# Patient Record
Sex: MALE | Race: WHITE | NOT HISPANIC OR LATINO | Employment: OTHER | URBAN - METROPOLITAN AREA
[De-identification: names, ages, dates, MRNs, and addresses within clinical notes are randomized per-mention and may not be internally consistent; named-entity substitution may affect disease eponyms.]

---

## 2017-01-23 ENCOUNTER — OFFICE VISIT (OUTPATIENT)
Dept: INTERNAL MEDICINE | Facility: CLINIC | Age: 77
End: 2017-01-23
Attending: INTERNAL MEDICINE
Payer: MEDICARE

## 2017-01-23 VITALS
BODY MASS INDEX: 25.76 KG/M2 | HEART RATE: 70 BPM | WEIGHT: 184 LBS | DIASTOLIC BLOOD PRESSURE: 62 MMHG | OXYGEN SATURATION: 97 % | HEIGHT: 71 IN | SYSTOLIC BLOOD PRESSURE: 110 MMHG

## 2017-01-23 DIAGNOSIS — N40.1 BENIGN NON-NODULAR PROSTATIC HYPERPLASIA WITH LOWER URINARY TRACT SYMPTOMS: ICD-10-CM

## 2017-01-23 DIAGNOSIS — E78.00 HYPERCHOLESTEROLEMIA: ICD-10-CM

## 2017-01-23 DIAGNOSIS — M10.9 ACUTE GOUT INVOLVING TOE OF RIGHT FOOT, UNSPECIFIED CAUSE: ICD-10-CM

## 2017-01-23 DIAGNOSIS — R32 URINARY INCONTINENCE, UNSPECIFIED TYPE: ICD-10-CM

## 2017-01-23 DIAGNOSIS — R41.3 MEMORY LOSS: ICD-10-CM

## 2017-01-23 DIAGNOSIS — E11.9 TYPE 2 DIABETES MELLITUS WITHOUT COMPLICATION, WITHOUT LONG-TERM CURRENT USE OF INSULIN: ICD-10-CM

## 2017-01-23 DIAGNOSIS — Z95.2 HISTORY OF MITRAL VALVE REPLACEMENT: ICD-10-CM

## 2017-01-23 DIAGNOSIS — I48.21 PERMANENT ATRIAL FIBRILLATION: Primary | ICD-10-CM

## 2017-01-23 PROCEDURE — G0442 ANNUAL ALCOHOL SCREEN 15 MIN: HCPCS | Mod: 59,S$GLB,, | Performed by: INTERNAL MEDICINE

## 2017-01-23 PROCEDURE — 99214 OFFICE O/P EST MOD 30 MIN: CPT | Mod: 25,S$GLB,, | Performed by: INTERNAL MEDICINE

## 2017-01-23 PROCEDURE — 1160F RVW MEDS BY RX/DR IN RCRD: CPT | Mod: S$GLB,,, | Performed by: INTERNAL MEDICINE

## 2017-01-23 PROCEDURE — G0444 DEPRESSION SCREEN ANNUAL: HCPCS | Mod: 59,S$GLB,, | Performed by: INTERNAL MEDICINE

## 2017-01-23 PROCEDURE — 3074F SYST BP LT 130 MM HG: CPT | Mod: S$GLB,,, | Performed by: INTERNAL MEDICINE

## 2017-01-23 PROCEDURE — 3078F DIAST BP <80 MM HG: CPT | Mod: S$GLB,,, | Performed by: INTERNAL MEDICINE

## 2017-01-23 PROCEDURE — 1157F ADVNC CARE PLAN IN RCRD: CPT | Mod: S$GLB,,, | Performed by: INTERNAL MEDICINE

## 2017-01-23 PROCEDURE — 1159F MED LIST DOCD IN RCRD: CPT | Mod: S$GLB,,, | Performed by: INTERNAL MEDICINE

## 2017-01-23 NOTE — MR AVS SNAPSHOT
[facility]  [address] [facility], [address] [address]  Phone: [phone]  Fax: [phone]                  Rikki Morrison   2017 11:15 AM   Office Visit    Description:  Male : 1940   Provider:  MADAI Villafuerte MD   Department:  [facility]           Reason for Visit     Urinary Incontinence     Memory Loss           Diagnoses this Visit        Comments    Permanent atrial fibrillation    -  Primary     History of mitral valve replacement         Type 2 diabetes mellitus without complication, without long-term current use of insulin         Memory loss         Hypercholesterolemia                To Do List           Future Appointments        Provider Department Dept Phone    3/8/2017 11:15 AM MD [facility] [phone]      Goals (5 Years of Data)     None      Follow-Up and Disposition     Return in about 4 months (around 2017).      [facility]     [facility] -  Assistance  Registered nurses in the [facility] provide clinical advisement, health education, appointment booking, and other advisory services.  Call for this free service at [phone].             Medications           Message regarding Medications     Verify the changes and/or additions to your medication regime listed below are the same as discussed with your clinician today.  If any of these changes or additions are incorrect, please notify your healthcare provider.        STOP taking these medications     PREVNAR 13, PF, 0.5 mL Syrg            Verify that the below list of medications is an accurate representation of the medications you are currently taking.  If none reported, the list may be blank. If incorrect, please contact your healthcare provider. Carry this list with you in case of emergency.           Current Medications     amlodipine (NORVASC) 2.5 MG tablet Take 1 tablet (2.5 mg total) by mouth once daily.    aspirin (ECOTRIN) 81 MG EC tablet Take 1  "tablet (81 mg total) by mouth once daily.    cholecalciferol, vitamin D3, (VITAMIN D3) 1,000 unit capsule Take 1,000 Units by mouth once daily.    cyanocobalamin, vitamin B-12, 1,000 mcg Subl Place 1 tablet under the tongue once daily.    donepezil (ARICEPT ODT) 10 mg disintegrating tablet Take 1 tablet (10 mg total) by mouth every evening.    ferrous sulfate 325 (65 FE) MG EC tablet Take 325 mg by mouth once daily.    finasteride (PROSCAR) 5 mg tablet Take 1 tablet (5 mg total) by mouth once daily.    fish oil-omega-3 fatty acids 300-1,000 mg capsule Take 1 g by mouth once daily.    indomethacin (INDOCIN) 50 MG capsule TK 1 C PO TID WITH MEALS    metoprolol succinate (TOPROL-XL) 50 MG 24 hr tablet Take 1 tablet (50 mg total) by mouth once daily.    nitroGLYCERIN (NITROSTAT) 0.4 MG SL tablet Place 1 tablet (0.4 mg total) under the tongue every 5 (five) minutes as needed for Chest pain.    omeprazole (PRILOSEC) 20 MG capsule Take 1 capsule (20 mg total) by mouth 2 (two) times daily.    PNV w/o calcium-iron fum-FA (M-VIT) 27-1 mg Tab Take by mouth.    potassium citrate (UROCIT-K) 10 mEq (1,080 mg) TbSR Take 1 tablet (10 mEq total) by mouth 3 (three) times daily with meals.    rosuvastatin (CRESTOR) 5 MG tablet Take 1 tablet (5 mg total) by mouth every evening.    tamsulosin (FLOMAX) 0.4 mg Cp24 Take 1 capsule (0.4 mg total) by mouth once daily.    trospium (SANCTURA XR) 60 mg Cp24 capsule     UBIDECARENONE (COENZYME Q10) 200 mg Tab Take by mouth once daily.    vortioxetine (TRINTELLIX) 5 mg Tab Take 1 tablet by mouth once daily.    warfarin (COUMADIN) 5 MG tablet Take 1 tablet (5 mg total) by mouth Daily. DOSE TO BE ADJUSTED ACCORDING TO INR.           Clinical Reference Information           Vital Signs - Last Recorded  Most recent update: 1/23/2017 11:25 AM by Andres Church MA    BP Pulse Ht Wt SpO2 BMI    110/62 70 5' 11" (1.803 m) 83.5 kg (184 lb) 97% 25.66 kg/m2      Blood Pressure          Most Recent Value "    BP  110/62      Allergies as of 1/23/2017     Pcn [Penicillins]      Immunizations Administered on Date of Encounter - 1/23/2017     None

## 2017-01-23 NOTE — PROGRESS NOTES
"Subjective:       Patient ID: Rikki Morrison is a 76 y.o. male.    Chief Complaint: Urinary Incontinence and Memory Loss    HPI Comments: Saw Dr. Theodore for urinary incont. Not is Prostate. On Sanctura without much improvement.  Dr. Theodore believes it is due to "his brain"  Still poor memory.  Agitation is better on the Trintellix.    Review of Systems   Constitutional: Negative.    Respiratory: Negative.    Cardiovascular: Negative.    Genitourinary: Positive for enuresis and urgency.   Psychiatric/Behavioral: Positive for confusion. Negative for dysphoric mood.       Objective:      Physical Exam   Constitutional: He appears well-developed and well-nourished.   HENT:   Head: Normocephalic and atraumatic.   Eyes: Pupils are equal, round, and reactive to light.   Cardiovascular: Normal rate.  An irregularly irregular rhythm present.   Murmur heard.   Crescendo decrescendo systolic murmur is present with a grade of 3/6   Pulses:       Dorsalis pedis pulses are 2+ on the right side, and 1+ on the left side.   Mechanical sounds   Pulmonary/Chest: Effort normal.   Musculoskeletal: He exhibits edema.        Lumbar back: Normal.        Right foot: There is decreased range of motion.        Left foot: There is swelling.        Feet:    1+ edema bilat   Feet:   Right Foot:   Protective Sensation: 3 sites tested. 3 sites sensed.   Skin Integrity: Positive for erythema.   Left Foot:   Protective Sensation: 3 sites tested. 3 sites sensed.   Skin Integrity: Positive for erythema.   Neurological: He is alert. He has normal strength.       Assessment:       1. Permanent atrial fibrillation    2. History of mitral valve replacement    3. Type 2 diabetes mellitus without complication, without long-term current use of insulin    4. Memory loss    5. Hypercholesterolemia    6. Benign non-nodular prostatic hyperplasia with lower urinary tract symptoms    7. Urinary incontinence, unspecified type        Plan:       Per orders and " D/C instructions.  Continue meds/diet for A. Fib, DM, and high cholest. which are stable.  See Dr. Mae for memory loss and urinary incont.    Screening: The patient was screened for depression with the PHQ2 questionnaire and possible health consequences were discussed with the patient, who understands (15 minutes spent). The patient was screened for the misuse of alcohol, by asking the number of drinks per average week, and if pt has had more than 4 drinks (more than 3 for women and elderly) in 1 day within the past year. The health and legal consequences of misuse were discussed (15 minutes spent). The patient was screened for obesity (BMI>30), If the current BMI > 30, then the possible consequences of obesity, as well as the benefits of diet, exercise, and weight loss were discussed (15 minutes spent).

## 2017-02-10 RX ORDER — OMEPRAZOLE 20 MG/1
CAPSULE, DELAYED RELEASE ORAL
Qty: 180 CAPSULE | Refills: 1 | Status: SHIPPED | OUTPATIENT
Start: 2017-02-10 | End: 2017-08-01 | Stop reason: SDUPTHER

## 2017-03-08 ENCOUNTER — OFFICE VISIT (OUTPATIENT)
Dept: INTERNAL MEDICINE | Facility: CLINIC | Age: 77
End: 2017-03-08
Attending: INTERNAL MEDICINE
Payer: MEDICARE

## 2017-03-08 VITALS
SYSTOLIC BLOOD PRESSURE: 132 MMHG | BODY MASS INDEX: 26.04 KG/M2 | WEIGHT: 186 LBS | HEART RATE: 75 BPM | HEIGHT: 71 IN | OXYGEN SATURATION: 97 % | DIASTOLIC BLOOD PRESSURE: 68 MMHG

## 2017-03-08 DIAGNOSIS — E78.00 HYPERCHOLESTEROLEMIA: Primary | ICD-10-CM

## 2017-03-08 DIAGNOSIS — E11.9 TYPE 2 DIABETES MELLITUS WITHOUT COMPLICATION, WITHOUT LONG-TERM CURRENT USE OF INSULIN: ICD-10-CM

## 2017-03-08 DIAGNOSIS — I48.21 PERMANENT ATRIAL FIBRILLATION: ICD-10-CM

## 2017-03-08 DIAGNOSIS — N40.1 BENIGN NON-NODULAR PROSTATIC HYPERPLASIA WITH LOWER URINARY TRACT SYMPTOMS: ICD-10-CM

## 2017-03-08 DIAGNOSIS — R41.3 MEMORY LOSS: ICD-10-CM

## 2017-03-08 DIAGNOSIS — R45.1 AGITATION: ICD-10-CM

## 2017-03-08 PROCEDURE — 3075F SYST BP GE 130 - 139MM HG: CPT | Mod: S$GLB,,, | Performed by: INTERNAL MEDICINE

## 2017-03-08 PROCEDURE — 3078F DIAST BP <80 MM HG: CPT | Mod: S$GLB,,, | Performed by: INTERNAL MEDICINE

## 2017-03-08 PROCEDURE — 1157F ADVNC CARE PLAN IN RCRD: CPT | Mod: S$GLB,,, | Performed by: INTERNAL MEDICINE

## 2017-03-08 PROCEDURE — 1160F RVW MEDS BY RX/DR IN RCRD: CPT | Mod: S$GLB,,, | Performed by: INTERNAL MEDICINE

## 2017-03-08 PROCEDURE — G0442 ANNUAL ALCOHOL SCREEN 15 MIN: HCPCS | Mod: 59,S$GLB,, | Performed by: INTERNAL MEDICINE

## 2017-03-08 PROCEDURE — 99214 OFFICE O/P EST MOD 30 MIN: CPT | Mod: 25,S$GLB,, | Performed by: INTERNAL MEDICINE

## 2017-03-08 PROCEDURE — 1159F MED LIST DOCD IN RCRD: CPT | Mod: S$GLB,,, | Performed by: INTERNAL MEDICINE

## 2017-03-08 PROCEDURE — G0444 DEPRESSION SCREEN ANNUAL: HCPCS | Mod: 59,S$GLB,, | Performed by: INTERNAL MEDICINE

## 2017-03-08 NOTE — MR AVS SNAPSHOT
[facility]  [address] [facility], [address] [address]  Phone: [phone]  Fax: [phone]                  Rikki Morrison   3/8/2017 11:15 AM   Office Visit    Description:  Male : 1940   Provider:  MADAI Villafuerte MD   Department:  [facility]           Reason for Visit     Follow-up           Diagnoses this Visit        Comments    Hypercholesterolemia    -  Primary     Memory loss         Benign non-nodular prostatic hyperplasia with lower urinary tract symptoms         Permanent atrial fibrillation         Type 2 diabetes mellitus without complication, without long-term current use of insulin                To Do List           Future Appointments        Provider Department Dept Phone    2017 1:00 PM MD [facility] [phone]      Goals (5 Years of Data)     None      Follow-Up and Disposition     Return in about 4 months (around 2017).      [facility]     [facility] On Call Nurse Care Line -  Assistance  Registered nurses in the [facility] On Call Center provide clinical advisement, health education, appointment booking, and other advisory services.  Call for this free service at [phone].             Medications           Message regarding Medications     Verify the changes and/or additions to your medication regime listed below are the same as discussed with your clinician today.  If any of these changes or additions are incorrect, please notify your healthcare provider.             Verify that the below list of medications is an accurate representation of the medications you are currently taking.  If none reported, the list may be blank. If incorrect, please contact your healthcare provider. Carry this list with you in case of emergency.           Current Medications     amlodipine (NORVASC) 2.5 MG tablet Take 1 tablet (2.5 mg total) by mouth once daily.    aspirin (ECOTRIN) 81 MG EC tablet Take 1 tablet (81 mg total) by mouth once daily.     "cholecalciferol, vitamin D3, (VITAMIN D3) 1,000 unit capsule Take 1,000 Units by mouth once daily.    cyanocobalamin, vitamin B-12, 1,000 mcg Subl Place 1 tablet under the tongue once daily.    donepezil (ARICEPT ODT) 10 mg disintegrating tablet Take 1 tablet (10 mg total) by mouth every evening.    ferrous sulfate 325 (65 FE) MG EC tablet Take 325 mg by mouth once daily.    finasteride (PROSCAR) 5 mg tablet Take 1 tablet (5 mg total) by mouth once daily.    fish oil-omega-3 fatty acids 300-1,000 mg capsule Take 1 g by mouth once daily.    indomethacin (INDOCIN) 50 MG capsule TK 1 C PO TID WITH MEALS    metoprolol succinate (TOPROL-XL) 50 MG 24 hr tablet Take 1 tablet (50 mg total) by mouth once daily.    nitroGLYCERIN (NITROSTAT) 0.4 MG SL tablet Place 1 tablet (0.4 mg total) under the tongue every 5 (five) minutes as needed for Chest pain.    omeprazole (PRILOSEC) 20 MG capsule TAKE ONE CAPSULE BY MOUTH TWICE DAILY    PNV w/o calcium-iron fum-FA (M-VIT) 27-1 mg Tab Take by mouth.    potassium citrate (UROCIT-K) 10 mEq (1,080 mg) TbSR Take 1 tablet (10 mEq total) by mouth 3 (three) times daily with meals.    rosuvastatin (CRESTOR) 5 MG tablet Take 1 tablet (5 mg total) by mouth every evening.    trospium (SANCTURA XR) 60 mg Cp24 capsule     UBIDECARENONE (COENZYME Q10) 200 mg Tab Take by mouth once daily.    vortioxetine (TRINTELLIX) 5 mg Tab Take 1 tablet by mouth once daily.    warfarin (COUMADIN) 5 MG tablet Take 1 tablet (5 mg total) by mouth Daily. DOSE TO BE ADJUSTED ACCORDING TO INR.           Clinical Reference Information           Your Vitals Were     BP Pulse Height Weight SpO2 BMI    132/68 75 5' 11" (1.803 m) 84.4 kg (186 lb) 97% 25.94 kg/m2      Blood Pressure          Most Recent Value    BP  132/68      Allergies as of 3/8/2017     Pcn [Penicillins]      Immunizations Administered on Date of Encounter - 3/8/2017     None      Language Assistance Services     ATTENTION: Language assistance services " are available, free of charge. Please call [phone].      ATENCIÓN: Si habla chaimañol, tiene a preston disposición servicios gratuitos de asistencia lingüística. Llame al [phone].     CHÚ Ý: N?u b?n nói Ti?ng Vi?t, có các d?ch v? h? tr? ngôn ng? mi?n phí dành cho b?n. G?i s? [phone].         [facility] complies with applicable Federal civil rights laws and does not discriminate on the basis of race, color, national origin, age, disability, or sex.

## 2017-03-08 NOTE — PROGRESS NOTES
Subjective:       Patient ID: Rikki Morrison is a 76 y.o. male.    Chief Complaint: Follow-up (4 month)    HPI Comments: Memory loss seems stable. Has appt with Dr. Mae in May.  Has some urinary incont. No better with Santura.    Diabetes   He presents for his follow-up diabetic visit. He has type 2 diabetes mellitus. His disease course has been stable.     Review of Systems   Constitutional: Negative.    Respiratory: Negative.    Cardiovascular: Negative.    Genitourinary: Positive for enuresis.   Psychiatric/Behavioral: Positive for agitation. Negative for dysphoric mood.       Objective:      Physical Exam   Constitutional: He appears well-developed and well-nourished.   HENT:   Head: Normocephalic and atraumatic.   Eyes: Pupils are equal, round, and reactive to light.   Cardiovascular: Normal rate and regular rhythm.    Murmur heard.   Crescendo decrescendo systolic murmur is present with a grade of 3/6   Pulses:       Dorsalis pedis pulses are 2+ on the right side, and 1+ on the left side.   Mechanical sounds   Pulmonary/Chest: Effort normal.   Musculoskeletal: He exhibits edema.        Lumbar back: Normal.        Right foot: There is decreased range of motion.        Left foot: There is swelling.        Feet:    1+ edema bilat   Feet:   Right Foot:   Protective Sensation: 3 sites tested. 3 sites sensed.   Skin Integrity: Positive for erythema.   Left Foot:   Protective Sensation: 3 sites tested. 3 sites sensed.   Skin Integrity: Positive for erythema.   Neurological: He is alert. He has normal strength.       Assessment:       1. Hypercholesterolemia    2. Memory loss    3. Benign non-nodular prostatic hyperplasia with lower urinary tract symptoms    4. Permanent atrial fibrillation    5. Type 2 diabetes mellitus without complication, without long-term current use of insulin    6. Agitation        Plan:       Per orders and D/C instructions.  Continue meds/diet for DM, BPH, A. fib, and high cholest.  which are stable.  See Dr. Benavides for memory loss.    Screening: The patient was screened for depression with the PHQ2 questionnaire and possible health consequences were discussed with the patient, who understands (15 minutes spent). The patient was screened for the misuse of alcohol, by asking the number of drinks per average week, and if pt has had more than 4 drinks (more than 3 for women and elderly) in 1 day within the past year. The health and legal consequences of misuse were discussed (15 minutes spent). The patient was screened for obesity (BMI>30), If the current BMI > 30, then the possible consequences of obesity, as well as the benefits of diet, exercise, and weight loss were discussed (15 minutes spent).

## 2017-03-22 PROBLEM — E66.3 OVERWEIGHT: Status: ACTIVE | Noted: 2017-03-22

## 2017-05-08 ENCOUNTER — HOSPITAL ENCOUNTER (OUTPATIENT)
Dept: RADIOLOGY | Facility: OTHER | Age: 77
Discharge: HOME | End: 2017-05-08
Attending: INTERNAL MEDICINE
Payer: MEDICARE

## 2017-05-08 DIAGNOSIS — S49.91XA RIGHT SHOULDER INJURY, INITIAL ENCOUNTER: ICD-10-CM

## 2017-05-08 DIAGNOSIS — S49.91XA RIGHT SHOULDER INJURY, INITIAL ENCOUNTER: Primary | ICD-10-CM

## 2017-05-08 PROCEDURE — 73030 X-RAY EXAM OF SHOULDER: CPT | Mod: TC,RT

## 2017-05-08 PROCEDURE — 73030 X-RAY EXAM OF SHOULDER: CPT | Mod: 26,RT,, | Performed by: RADIOLOGY

## 2017-06-05 DIAGNOSIS — R41.3 MEMORY LOSS: ICD-10-CM

## 2017-06-05 RX ORDER — DONEPEZIL HYDROCHLORIDE 10 MG/1
TABLET, ORALLY DISINTEGRATING ORAL
Qty: 90 TABLET | Refills: 3 | Status: SHIPPED | OUTPATIENT
Start: 2017-06-05 | End: 2017-12-05

## 2017-06-12 DIAGNOSIS — Z79.01 CHRONIC ANTICOAGULATION: ICD-10-CM

## 2017-06-12 DIAGNOSIS — I05.9 MITRAL VALVE DISEASE: ICD-10-CM

## 2017-06-12 DIAGNOSIS — I48.21 PERMANENT ATRIAL FIBRILLATION: ICD-10-CM

## 2017-06-14 DIAGNOSIS — Z79.01 CHRONIC ANTICOAGULATION: ICD-10-CM

## 2017-06-14 DIAGNOSIS — I05.9 MITRAL VALVE DISEASE: ICD-10-CM

## 2017-06-14 DIAGNOSIS — I48.21 PERMANENT ATRIAL FIBRILLATION: ICD-10-CM

## 2017-06-21 PROBLEM — E66.3 OVERWEIGHT: Status: RESOLVED | Noted: 2017-03-22 | Resolved: 2017-06-21

## 2017-07-31 DIAGNOSIS — Z79.01 CHRONIC ANTICOAGULATION: ICD-10-CM

## 2017-07-31 DIAGNOSIS — I48.21 PERMANENT ATRIAL FIBRILLATION: ICD-10-CM

## 2017-07-31 DIAGNOSIS — I05.9 MITRAL VALVE DISEASE: ICD-10-CM

## 2017-07-31 DIAGNOSIS — N40.1 BENIGN NON-NODULAR PROSTATIC HYPERPLASIA WITH LOWER URINARY TRACT SYMPTOMS: ICD-10-CM

## 2017-07-31 RX ORDER — AMLODIPINE BESYLATE 2.5 MG/1
TABLET ORAL
Qty: 90 TABLET | Refills: 3 | Status: SHIPPED | OUTPATIENT
Start: 2017-07-31 | End: 2018-01-01 | Stop reason: SDUPTHER

## 2017-07-31 RX ORDER — FINASTERIDE 5 MG/1
TABLET, FILM COATED ORAL
Qty: 90 TABLET | Refills: 3 | Status: SHIPPED | OUTPATIENT
Start: 2017-07-31 | End: 2018-01-01 | Stop reason: SDUPTHER

## 2017-07-31 RX ORDER — WARFARIN SODIUM 5 MG/1
TABLET ORAL
Qty: 90 TABLET | Refills: 5 | Status: ON HOLD | OUTPATIENT
Start: 2017-07-31 | End: 2018-01-01 | Stop reason: HOSPADM

## 2017-08-01 RX ORDER — OMEPRAZOLE 20 MG/1
CAPSULE, DELAYED RELEASE ORAL
Qty: 180 CAPSULE | Refills: 3 | Status: SHIPPED | OUTPATIENT
Start: 2017-08-01 | End: 2018-01-01 | Stop reason: SDUPTHER

## 2017-09-06 DIAGNOSIS — E11.9 TYPE 2 DIABETES MELLITUS WITHOUT COMPLICATION, WITHOUT LONG-TERM CURRENT USE OF INSULIN: ICD-10-CM

## 2017-09-06 DIAGNOSIS — Z00.00 ROUTINE HISTORY AND PHYSICAL EXAMINATION OF ADULT: ICD-10-CM

## 2017-09-06 DIAGNOSIS — E78.00 HYPERCHOLESTEROLEMIA: Primary | ICD-10-CM

## 2017-09-06 DIAGNOSIS — I48.20 CHRONIC ATRIAL FIBRILLATION: ICD-10-CM

## 2017-09-11 ENCOUNTER — OFFICE VISIT (OUTPATIENT)
Dept: INTERNAL MEDICINE | Facility: CLINIC | Age: 77
End: 2017-09-11
Attending: INTERNAL MEDICINE
Payer: MEDICARE

## 2017-09-11 VITALS
OXYGEN SATURATION: 98 % | SYSTOLIC BLOOD PRESSURE: 120 MMHG | BODY MASS INDEX: 25.06 KG/M2 | WEIGHT: 179 LBS | HEIGHT: 71 IN | HEART RATE: 73 BPM | DIASTOLIC BLOOD PRESSURE: 62 MMHG

## 2017-09-11 DIAGNOSIS — R41.3 MEMORY LOSS: ICD-10-CM

## 2017-09-11 DIAGNOSIS — F02.818 DEMENTIA DUE TO PARKINSON'S DISEASE WITH BEHAVIORAL DISTURBANCE: ICD-10-CM

## 2017-09-11 DIAGNOSIS — E78.00 HYPERCHOLESTEROLEMIA: Primary | ICD-10-CM

## 2017-09-11 DIAGNOSIS — I48.20 CHRONIC ATRIAL FIBRILLATION: ICD-10-CM

## 2017-09-11 DIAGNOSIS — E11.9 TYPE 2 DIABETES MELLITUS WITHOUT COMPLICATION, WITHOUT LONG-TERM CURRENT USE OF INSULIN: ICD-10-CM

## 2017-09-11 DIAGNOSIS — G20.A1 DEMENTIA DUE TO PARKINSON'S DISEASE WITH BEHAVIORAL DISTURBANCE: ICD-10-CM

## 2017-09-11 DIAGNOSIS — R45.1 AGITATION: ICD-10-CM

## 2017-09-11 LAB
ALBUMIN SERPL-MCNC: 4.6 G/DL (ref 3.6–5.1)
ALBUMIN/GLOB SERPL: 2.3 (CALC) (ref 1–2.5)
ALP SERPL-CCNC: 55 U/L (ref 40–115)
ALT SERPL-CCNC: 13 U/L (ref 9–46)
AST SERPL-CCNC: 18 U/L (ref 10–35)
BASOPHILS # BLD AUTO: 49 CELLS/UL (ref 0–200)
BASOPHILS NFR BLD AUTO: 0.6 %
BILIRUB SERPL-MCNC: 0.9 MG/DL (ref 0.2–1.2)
BUN SERPL-MCNC: 22 MG/DL (ref 7–25)
BUN/CREAT SERPL: ABNORMAL (CALC) (ref 6–22)
CALCIUM SERPL-MCNC: 10.5 MG/DL (ref 8.6–10.3)
CHLORIDE SERPL-SCNC: 107 MMOL/L (ref 98–110)
CHOLEST SERPL-MCNC: 168 MG/DL
CHOLEST/HDLC SERPL: 5.4 (CALC)
CO2 SERPL-SCNC: 25 MMOL/L (ref 20–31)
CREAT SERPL-MCNC: 1.16 MG/DL (ref 0.7–1.18)
EOSINOPHIL # BLD AUTO: 180 CELLS/UL (ref 15–500)
EOSINOPHIL NFR BLD AUTO: 2.2 %
ERYTHROCYTE [DISTWIDTH] IN BLOOD BY AUTOMATED COUNT: 13.1 % (ref 11–15)
GFR SERPL CREATININE-BSD FRML MDRD: 60 ML/MIN/1.73M2
GLOBULIN SER CALC-MCNC: 2 G/DL (CALC) (ref 1.9–3.7)
GLUCOSE SERPL-MCNC: 131 MG/DL (ref 65–99)
HBA1C MFR BLD: 5.8 % OF TOTAL HGB
HCT VFR BLD AUTO: 46.8 % (ref 38.5–50)
HDLC SERPL-MCNC: 31 MG/DL
HGB BLD-MCNC: 15.5 G/DL (ref 13.2–17.1)
IRON SATN MFR SERPL: 29 % (CALC) (ref 15–60)
IRON SERPL-MCNC: 85 MCG/DL (ref 50–180)
LDLC SERPL CALC-MCNC: 107 MG/DL (CALC)
LYMPHOCYTES # BLD AUTO: 2698 CELLS/UL (ref 850–3900)
LYMPHOCYTES NFR BLD AUTO: 32.9 %
MCH RBC QN AUTO: 30.5 PG (ref 27–33)
MCHC RBC AUTO-ENTMCNC: 33.1 G/DL (ref 32–36)
MCV RBC AUTO: 92.1 FL (ref 80–100)
MONOCYTES # BLD AUTO: 713 CELLS/UL (ref 200–950)
MONOCYTES NFR BLD AUTO: 8.7 %
NEUTROPHILS # BLD AUTO: 4559 CELLS/UL (ref 1500–7800)
NEUTROPHILS NFR BLD AUTO: 55.6 %
NONHDLC SERPL-MCNC: 137 MG/DL (CALC)
PLATELET # BLD AUTO: 200 THOUSAND/UL (ref 140–400)
PMV BLD REES-ECKER: 11 FL (ref 7.5–12.5)
POTASSIUM SERPL-SCNC: 4.2 MMOL/L (ref 3.5–5.3)
PROT SERPL-MCNC: 6.6 G/DL (ref 6.1–8.1)
RBC # BLD AUTO: 5.08 MILLION/UL (ref 4.2–5.8)
SODIUM SERPL-SCNC: 142 MMOL/L (ref 135–146)
TIBC SERPL-MCNC: 296 MCG/DL (CALC) (ref 250–425)
TRIGL SERPL-MCNC: 180 MG/DL
TSH SERPL-ACNC: 1.16 MIU/L (ref 0.4–4.5)
VIT B12 SERPL-MCNC: 748 PG/ML (ref 200–1100)
WBC # BLD AUTO: 8.2 THOUSAND/UL (ref 3.8–10.8)

## 2017-09-11 PROCEDURE — G0008 ADMIN INFLUENZA VIRUS VAC: HCPCS | Mod: S$GLB,,, | Performed by: INTERNAL MEDICINE

## 2017-09-11 PROCEDURE — 3078F DIAST BP <80 MM HG: CPT | Mod: S$GLB,,, | Performed by: INTERNAL MEDICINE

## 2017-09-11 PROCEDURE — 90662 IIV NO PRSV INCREASED AG IM: CPT | Mod: S$GLB,,, | Performed by: INTERNAL MEDICINE

## 2017-09-11 PROCEDURE — 1159F MED LIST DOCD IN RCRD: CPT | Mod: S$GLB,,, | Performed by: INTERNAL MEDICINE

## 2017-09-11 PROCEDURE — 3074F SYST BP LT 130 MM HG: CPT | Mod: S$GLB,,, | Performed by: INTERNAL MEDICINE

## 2017-09-11 PROCEDURE — 3008F BODY MASS INDEX DOCD: CPT | Mod: S$GLB,,, | Performed by: INTERNAL MEDICINE

## 2017-09-11 PROCEDURE — 99214 OFFICE O/P EST MOD 30 MIN: CPT | Mod: S$GLB,,, | Performed by: INTERNAL MEDICINE

## 2017-09-11 RX ORDER — MEMANTINE HYDROCHLORIDE 10 MG/1
10 TABLET ORAL 2 TIMES DAILY
COMMUNITY

## 2017-09-12 PROBLEM — F02.80 DEMENTIA IN PARKINSON'S DISEASE: Status: ACTIVE | Noted: 2017-09-12

## 2017-09-12 PROBLEM — G20.A1 DEMENTIA IN PARKINSON'S DISEASE: Status: ACTIVE | Noted: 2017-09-12

## 2017-09-12 NOTE — PROGRESS NOTES
Subjective:       Patient ID: Rikki Morrison is a 77 y.o. male.    Chief Complaint: Follow-up (6 month); Fatigue (sleeps alot); Memory Loss (cannot remember how to do things / cannot comprehend when spoken to); and Mood Swings (gets angry, shouts, slams doors)    Dx'ed with Parkinson's Dementia by Dr. Mae.  Memory getting worse. Doesn't initiate any activity.  Gets agitated. Dr. Mae started Seroquel, but he is not taking it.      Diabetes   He presents for his follow-up diabetic visit. He has type 2 diabetes mellitus. His disease course has been stable. Hypoglycemia symptoms include confusion. Associated symptoms include fatigue.     Review of Systems   Constitutional: Positive for fatigue.   Respiratory: Negative.    Cardiovascular: Negative.    Psychiatric/Behavioral: Positive for agitation and confusion.       Objective:      Physical Exam   Constitutional: He appears well-developed and well-nourished.   HENT:   Head: Normocephalic and atraumatic.   Eyes: Pupils are equal, round, and reactive to light.   Cardiovascular: Normal rate and regular rhythm.    Murmur heard.   Crescendo decrescendo systolic murmur is present with a grade of 3/6   Pulses:       Dorsalis pedis pulses are 2+ on the right side, and 1+ on the left side.   Mechanical sounds   Pulmonary/Chest: Effort normal.   Musculoskeletal: He exhibits edema.        Lumbar back: Normal.        Right foot: There is decreased range of motion.        Left foot: There is swelling.        Feet:    1+ edema bilat   Feet:   Right Foot:   Protective Sensation: 3 sites tested. 3 sites sensed.   Skin Integrity: Positive for erythema.   Left Foot:   Protective Sensation: 3 sites tested. 3 sites sensed.   Skin Integrity: Positive for erythema.   Neurological: He is alert. He has normal strength.       Assessment:       1. Hypercholesterolemia    2. Memory loss    3. Chronic atrial fibrillation    4. Type 2 diabetes mellitus without complication, without  long-term current use of insulin    5. Dementia due to Parkinson's disease with behavioral disturbance    6. Agitation        Plan:       Per orders and D/C instructions.  Continue meds/diet for DM, A. fib, and high cholest. which are stable.  F/u with Dr. Mae for Parkinson's dementia and agitation.

## 2017-11-30 DIAGNOSIS — R10.2 PELVIC PAIN: ICD-10-CM

## 2017-11-30 DIAGNOSIS — M25.551 PAIN OF BOTH HIP JOINTS: Primary | ICD-10-CM

## 2017-11-30 DIAGNOSIS — M25.552 PAIN OF BOTH HIP JOINTS: Primary | ICD-10-CM

## 2017-12-05 ENCOUNTER — HOSPITAL ENCOUNTER (OUTPATIENT)
Facility: OTHER | Age: 77
Discharge: NURSING, REHAB OR LONG-TERM CARE | End: 2017-12-08
Attending: EMERGENCY MEDICINE | Admitting: EMERGENCY MEDICINE
Payer: MEDICARE

## 2017-12-05 DIAGNOSIS — R26.9 GAIT DISTURBANCE: Primary | ICD-10-CM

## 2017-12-05 DIAGNOSIS — R79.1 SUPRATHERAPEUTIC INR: ICD-10-CM

## 2017-12-05 DIAGNOSIS — K59.00 CONSTIPATION: ICD-10-CM

## 2017-12-05 DIAGNOSIS — Z95.2 HISTORY OF MITRAL VALVE REPLACEMENT: ICD-10-CM

## 2017-12-05 DIAGNOSIS — G20.A1 DEMENTIA DUE TO PARKINSON'S DISEASE WITHOUT BEHAVIORAL DISTURBANCE: ICD-10-CM

## 2017-12-05 DIAGNOSIS — F02.80 DEMENTIA DUE TO PARKINSON'S DISEASE WITHOUT BEHAVIORAL DISTURBANCE: ICD-10-CM

## 2017-12-05 DIAGNOSIS — Z79.01 CHRONIC ANTICOAGULATION: ICD-10-CM

## 2017-12-05 DIAGNOSIS — E11.9 TYPE 2 DIABETES MELLITUS WITHOUT COMPLICATION, WITHOUT LONG-TERM CURRENT USE OF INSULIN: ICD-10-CM

## 2017-12-05 DIAGNOSIS — R26.2 UNABLE TO AMBULATE: ICD-10-CM

## 2017-12-05 DIAGNOSIS — E78.00 HYPERCHOLESTEROLEMIA: ICD-10-CM

## 2017-12-05 DIAGNOSIS — I48.21 PERMANENT ATRIAL FIBRILLATION: ICD-10-CM

## 2017-12-05 LAB
ANION GAP SERPL CALC-SCNC: 8 MMOL/L
BASOPHILS # BLD AUTO: 0.03 K/UL
BASOPHILS NFR BLD: 0.2 %
BILIRUB UR QL STRIP: NEGATIVE
BUN SERPL-MCNC: 28 MG/DL
CALCIUM SERPL-MCNC: 10.6 MG/DL
CHLORIDE SERPL-SCNC: 106 MMOL/L
CLARITY UR: CLEAR
CO2 SERPL-SCNC: 25 MMOL/L
COLOR UR: YELLOW
CREAT SERPL-MCNC: 1.1 MG/DL
DIFFERENTIAL METHOD: ABNORMAL
EOSINOPHIL # BLD AUTO: 0.3 K/UL
EOSINOPHIL NFR BLD: 2.5 %
ERYTHROCYTE [DISTWIDTH] IN BLOOD BY AUTOMATED COUNT: 14.1 %
EST. GFR  (AFRICAN AMERICAN): >60 ML/MIN/1.73 M^2
EST. GFR  (NON AFRICAN AMERICAN): >60 ML/MIN/1.73 M^2
ESTIMATED AVG GLUCOSE: 120 MG/DL
FERRITIN SERPL-MCNC: 584 NG/ML
FOLATE SERPL-MCNC: 12 NG/ML
GLUCOSE SERPL-MCNC: 162 MG/DL
GLUCOSE UR QL STRIP: NEGATIVE
HBA1C MFR BLD HPLC: 5.8 %
HCT VFR BLD AUTO: 36 %
HGB BLD-MCNC: 12.1 G/DL
HGB UR QL STRIP: ABNORMAL
INR PPP: 5
IRON SERPL-MCNC: 31 UG/DL
KETONES UR QL STRIP: NEGATIVE
LEUKOCYTE ESTERASE UR QL STRIP: NEGATIVE
LYMPHOCYTES # BLD AUTO: 2.6 K/UL
LYMPHOCYTES NFR BLD: 20.3 %
MCH RBC QN AUTO: 31.3 PG
MCHC RBC AUTO-ENTMCNC: 33.6 G/DL
MCV RBC AUTO: 93 FL
MONOCYTES # BLD AUTO: 1.3 K/UL
MONOCYTES NFR BLD: 10.3 %
NEUTROPHILS # BLD AUTO: 8.6 K/UL
NEUTROPHILS NFR BLD: 66.3 %
NITRITE UR QL STRIP: NEGATIVE
PH UR STRIP: 6 [PH] (ref 5–8)
PLATELET # BLD AUTO: 355 K/UL
PMV BLD AUTO: 9.7 FL
POCT GLUCOSE: 159 MG/DL (ref 70–110)
POCT GLUCOSE: 168 MG/DL (ref 70–110)
POCT GLUCOSE: 183 MG/DL (ref 70–110)
POCT GLUCOSE: 203 MG/DL (ref 70–110)
POTASSIUM SERPL-SCNC: 4.1 MMOL/L
PROT UR QL STRIP: NEGATIVE
PROTHROMBIN TIME: 52.2 SEC
RBC # BLD AUTO: 3.87 M/UL
SATURATED IRON: 15 %
SODIUM SERPL-SCNC: 139 MMOL/L
SP GR UR STRIP: 1.01 (ref 1–1.03)
TOTAL IRON BINDING CAPACITY: 210 UG/DL
TRANSFERRIN SERPL-MCNC: 142 MG/DL
URN SPEC COLLECT METH UR: ABNORMAL
UROBILINOGEN UR STRIP-ACNC: NEGATIVE EU/DL
VIT B12 SERPL-MCNC: 862 PG/ML
WBC # BLD AUTO: 13.01 K/UL

## 2017-12-05 PROCEDURE — G0378 HOSPITAL OBSERVATION PER HR: HCPCS

## 2017-12-05 PROCEDURE — 97530 THERAPEUTIC ACTIVITIES: CPT

## 2017-12-05 PROCEDURE — 80048 BASIC METABOLIC PNL TOTAL CA: CPT

## 2017-12-05 PROCEDURE — 99220 PR INITIAL OBSERVATION CARE,LEVL III: CPT | Mod: ,,, | Performed by: PHYSICIAN ASSISTANT

## 2017-12-05 PROCEDURE — G8979 MOBILITY GOAL STATUS: HCPCS | Mod: CJ

## 2017-12-05 PROCEDURE — 96360 HYDRATION IV INFUSION INIT: CPT

## 2017-12-05 PROCEDURE — 83036 HEMOGLOBIN GLYCOSYLATED A1C: CPT

## 2017-12-05 PROCEDURE — 86580 TB INTRADERMAL TEST: CPT | Performed by: HOSPITALIST

## 2017-12-05 PROCEDURE — 25000003 PHARM REV CODE 250: Performed by: PHYSICIAN ASSISTANT

## 2017-12-05 PROCEDURE — 99219 PR INITIAL OBSERVATION CARE,LEVL II: CPT | Mod: ,,, | Performed by: HOSPITALIST

## 2017-12-05 PROCEDURE — 25000003 PHARM REV CODE 250: Performed by: EMERGENCY MEDICINE

## 2017-12-05 PROCEDURE — 82746 ASSAY OF FOLIC ACID SERUM: CPT

## 2017-12-05 PROCEDURE — 85610 PROTHROMBIN TIME: CPT

## 2017-12-05 PROCEDURE — 85025 COMPLETE CBC W/AUTO DIFF WBC: CPT

## 2017-12-05 PROCEDURE — 97166 OT EVAL MOD COMPLEX 45 MIN: CPT

## 2017-12-05 PROCEDURE — 82607 VITAMIN B-12: CPT

## 2017-12-05 PROCEDURE — 83540 ASSAY OF IRON: CPT

## 2017-12-05 PROCEDURE — 99284 EMERGENCY DEPT VISIT MOD MDM: CPT | Mod: 25

## 2017-12-05 PROCEDURE — 82728 ASSAY OF FERRITIN: CPT

## 2017-12-05 PROCEDURE — 63600175 PHARM REV CODE 636 W HCPCS: Performed by: PHYSICIAN ASSISTANT

## 2017-12-05 PROCEDURE — G8987 SELF CARE CURRENT STATUS: HCPCS | Mod: CM

## 2017-12-05 PROCEDURE — G8988 SELF CARE GOAL STATUS: HCPCS | Mod: CL

## 2017-12-05 PROCEDURE — 81003 URINALYSIS AUTO W/O SCOPE: CPT

## 2017-12-05 PROCEDURE — 97116 GAIT TRAINING THERAPY: CPT | Mod: 59

## 2017-12-05 PROCEDURE — 63600175 PHARM REV CODE 636 W HCPCS: Performed by: HOSPITALIST

## 2017-12-05 PROCEDURE — 97162 PT EVAL MOD COMPLEX 30 MIN: CPT

## 2017-12-05 PROCEDURE — 82962 GLUCOSE BLOOD TEST: CPT

## 2017-12-05 PROCEDURE — 36415 COLL VENOUS BLD VENIPUNCTURE: CPT

## 2017-12-05 PROCEDURE — G8978 MOBILITY CURRENT STATUS: HCPCS | Mod: CL

## 2017-12-05 RX ORDER — TROSPIUM CHLORIDE ER 60 MG/1
60 CAPSULE ORAL DAILY
Status: DISCONTINUED | OUTPATIENT
Start: 2017-12-05 | End: 2017-12-08 | Stop reason: HOSPADM

## 2017-12-05 RX ORDER — AMLODIPINE BESYLATE 2.5 MG/1
2.5 TABLET ORAL DAILY
Status: DISCONTINUED | OUTPATIENT
Start: 2017-12-05 | End: 2017-12-08 | Stop reason: HOSPADM

## 2017-12-05 RX ORDER — IBUPROFEN 200 MG
16 TABLET ORAL
Status: DISCONTINUED | OUTPATIENT
Start: 2017-12-05 | End: 2017-12-08 | Stop reason: HOSPADM

## 2017-12-05 RX ORDER — GALANTAMINE HYDROBROMIDE 8 MG/1
8 CAPSULE, EXTENDED RELEASE ORAL NIGHTLY
COMMUNITY

## 2017-12-05 RX ORDER — BISACODYL 10 MG
10 SUPPOSITORY, RECTAL RECTAL DAILY PRN
Status: DISCONTINUED | OUTPATIENT
Start: 2017-12-05 | End: 2017-12-08 | Stop reason: HOSPADM

## 2017-12-05 RX ORDER — SODIUM CHLORIDE 9 MG/ML
1000 INJECTION, SOLUTION INTRAVENOUS
Status: COMPLETED | OUTPATIENT
Start: 2017-12-05 | End: 2017-12-05

## 2017-12-05 RX ORDER — ASPIRIN 81 MG/1
81 TABLET ORAL DAILY
Status: DISCONTINUED | OUTPATIENT
Start: 2017-12-05 | End: 2017-12-08 | Stop reason: HOSPADM

## 2017-12-05 RX ORDER — MEMANTINE HYDROCHLORIDE 5 MG/1
5 TABLET ORAL 2 TIMES DAILY
Status: DISCONTINUED | OUTPATIENT
Start: 2017-12-05 | End: 2017-12-08 | Stop reason: HOSPADM

## 2017-12-05 RX ORDER — ROSUVASTATIN CALCIUM 5 MG/1
5 TABLET, COATED ORAL NIGHTLY
Status: DISCONTINUED | OUTPATIENT
Start: 2017-12-05 | End: 2017-12-08 | Stop reason: HOSPADM

## 2017-12-05 RX ORDER — ACETAMINOPHEN 325 MG/1
650 TABLET ORAL EVERY 8 HOURS PRN
Status: DISCONTINUED | OUTPATIENT
Start: 2017-12-05 | End: 2017-12-08 | Stop reason: HOSPADM

## 2017-12-05 RX ORDER — POLYETHYLENE GLYCOL 3350 17 G/17G
17 POWDER, FOR SOLUTION ORAL DAILY
Status: DISCONTINUED | OUTPATIENT
Start: 2017-12-05 | End: 2017-12-08 | Stop reason: HOSPADM

## 2017-12-05 RX ORDER — QUETIAPINE FUMARATE 25 MG/1
25 TABLET, FILM COATED ORAL DAILY
COMMUNITY

## 2017-12-05 RX ORDER — IBUPROFEN 200 MG
24 TABLET ORAL
Status: DISCONTINUED | OUTPATIENT
Start: 2017-12-05 | End: 2017-12-08 | Stop reason: HOSPADM

## 2017-12-05 RX ORDER — INSULIN ASPART 100 [IU]/ML
0-5 INJECTION, SOLUTION INTRAVENOUS; SUBCUTANEOUS
Status: DISCONTINUED | OUTPATIENT
Start: 2017-12-05 | End: 2017-12-08 | Stop reason: HOSPADM

## 2017-12-05 RX ORDER — GLUCAGON 1 MG
1 KIT INJECTION
Status: DISCONTINUED | OUTPATIENT
Start: 2017-12-05 | End: 2017-12-08 | Stop reason: HOSPADM

## 2017-12-05 RX ORDER — FINASTERIDE 5 MG/1
5 TABLET, FILM COATED ORAL DAILY
Status: DISCONTINUED | OUTPATIENT
Start: 2017-12-05 | End: 2017-12-08 | Stop reason: HOSPADM

## 2017-12-05 RX ORDER — HALOPERIDOL 5 MG/ML
2 INJECTION INTRAMUSCULAR ONCE
Status: COMPLETED | OUTPATIENT
Start: 2017-12-05 | End: 2017-12-05

## 2017-12-05 RX ORDER — WARFARIN 1 MG/1
TABLET ORAL
Status: ON HOLD | COMMUNITY
End: 2017-12-07 | Stop reason: HOSPADM

## 2017-12-05 RX ORDER — SODIUM CHLORIDE 0.9 % (FLUSH) 0.9 %
5 SYRINGE (ML) INJECTION
Status: DISCONTINUED | OUTPATIENT
Start: 2017-12-05 | End: 2017-12-08 | Stop reason: HOSPADM

## 2017-12-05 RX ORDER — METOPROLOL SUCCINATE 50 MG/1
50 TABLET, EXTENDED RELEASE ORAL DAILY
Status: DISCONTINUED | OUTPATIENT
Start: 2017-12-05 | End: 2017-12-08 | Stop reason: HOSPADM

## 2017-12-05 RX ORDER — QUETIAPINE FUMARATE 25 MG/1
25 TABLET, FILM COATED ORAL DAILY
Status: DISCONTINUED | OUTPATIENT
Start: 2017-12-05 | End: 2017-12-08 | Stop reason: HOSPADM

## 2017-12-05 RX ADMIN — METOPROLOL SUCCINATE 50 MG: 50 TABLET, EXTENDED RELEASE ORAL at 09:12

## 2017-12-05 RX ADMIN — ROSUVASTATIN CALCIUM 5 MG: 5 TABLET ORAL at 08:12

## 2017-12-05 RX ADMIN — AMLODIPINE BESYLATE 2.5 MG: 2.5 TABLET ORAL at 09:12

## 2017-12-05 RX ADMIN — SODIUM CHLORIDE 1000 ML: 0.9 INJECTION, SOLUTION INTRAVENOUS at 01:12

## 2017-12-05 RX ADMIN — TUBERCULIN PURIFIED PROTEIN DERIVATIVE 5 UNITS: 5 INJECTION, SOLUTION INTRADERMAL at 03:12

## 2017-12-05 RX ADMIN — ASPIRIN 81 MG: 81 TABLET, COATED ORAL at 09:12

## 2017-12-05 RX ADMIN — HALOPERIDOL LACTATE 2 MG: 5 INJECTION, SOLUTION INTRAMUSCULAR at 04:12

## 2017-12-05 RX ADMIN — MEMANTINE HYDROCHLORIDE 5 MG: 5 TABLET ORAL at 09:12

## 2017-12-05 RX ADMIN — QUETIAPINE FUMARATE 25 MG: 25 TABLET, FILM COATED ORAL at 09:12

## 2017-12-05 RX ADMIN — FINASTERIDE 5 MG: 5 TABLET, FILM COATED ORAL at 09:12

## 2017-12-05 RX ADMIN — POLYETHYLENE GLYCOL 3350 17 G: 17 POWDER, FOR SOLUTION ORAL at 09:12

## 2017-12-05 RX ADMIN — MEMANTINE HYDROCHLORIDE 5 MG: 5 TABLET ORAL at 08:12

## 2017-12-05 NOTE — ASSESSMENT & PLAN NOTE
- last A1C: 5.8 on 9/8/17   - A1C pending for AM   - diabetic diet, SSRI, no insulin or oral meds used at home

## 2017-12-05 NOTE — ASSESSMENT & PLAN NOTE
- takes coumadin for A. Fib  - followed by Dr. Gamino   - patient not able to have INR checked 2/2 unable to get out of bed/ambulate   - hold coumadin   - cardiology consulted

## 2017-12-05 NOTE — ASSESSMENT & PLAN NOTE
- diagnosed 2004  - s/p mitral valve replacement  - anticoagulated with coumadin  - rate controlled with metoprolol 50 mg QD   - maintain on tele

## 2017-12-05 NOTE — PROGRESS NOTES
4:02pm - SW called [facility] program [phone], spoke to Ginny and completed LOCET and faxed PASRR to [phone].    4:41pm - SW visited pt's room; son Clifton present, stated pt's wife will be back soon; SW left a list of SNF providers and explained to son that PT has recommended SNF upon discharge.  Son stated will give list to pt's wife.  SW will f/u with wife.

## 2017-12-05 NOTE — H&P
[facility] Medicine  History & Physical    Patient Name: Rikki Morrison  MRN: [id number]  Admission Date: 12/5/2017  Attending Physician: Dinah Phan MD   Primary Care Provider: ODALYS Villafuerte MD         Patient information was obtained from patient, past medical records and ER records.     Subjective:     Principal Problem:Unable to ambulate    Chief Complaint:   Chief Complaint   Patient presents with    Fall     pt had a fall yesterday and has extensive bruising his left leg. pts mouth appears to have dried up blood in it. pt is awake alert and oriented x 2. 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         HPI: Mr. Rikki Morrison is a 77 y.o. male, with history of dementia, who was brought in for concern of constipation which started one week ago s/p fall. Per spouse who did not witness fall, she believes patient tripped over a wire in the backyard while she was inside the house. She notes that patient was able to ambulate immediately after the fall but was unable to ambulate later that night. Spouse states that the patient has not had a BM since falling and says that constipation is unchanged with half dose of miralax (given last night). She had been giving him imodium prior to the onset of constipation for diarrhea. She reports associated ecchymosis to the bilateral knees and thighs, as well as weakness and decreased appetite/fluid intake. She states she has been unable to have his INR checked on 11/30/17 as scheduled as he has been unable to ambulate to the car. Per spouse, patient denies fever, chills, nausea, vomiting, head trauma, numbness, abdominal pain, diarrhea, cough, or congestion. Spouse reports that patient was evaluated s/p fall with X-rays performed revealing no hip fracture. She reports a PSHx of mechanical valve replacement and says that patient has been on coumadin since 2004. Of note, history is provided by spouse due to patient's dementia status.    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 BMI 26.    Stricture and stenosis of esophagus 1/22/2014    Dilation 11/01, 12/13 Dr. Caldwell       Past Surgical History:   Procedure Laterality Date    CATARACT EXTRACTION, BILATERAL      HERNIA REPAIR      KNEE ARTHROSCOPY Right 1990    MITRAL VALVE REPLACEMENT  11/04    mechanical    Trigger finger right hand  1997       Review of patient's allergies indicates:   Allergen Reactions    Pcn [penicillins] Rash       No current facility-administered medications on file prior to encounter.      Current Outpatient Prescriptions on File Prior to Encounter   Medication Sig    amlodipine (NORVASC) 2.5 MG tablet TAKE ONE TABLET BY MOUTH ONCE DAILY    aspirin (ECOTRIN) 81 MG EC tablet Take 1 tablet (81 mg total) by mouth once daily.    cholecalciferol, vitamin D3, (VITAMIN D3) 1,000 unit capsule Take 1,000 Units by mouth once daily.    cyanocobalamin, vitamin B-12, 1,000 mcg Subl Place 1 tablet under the tongue once daily.    ferrous sulfate 325 (65 FE) MG EC tablet Take 325 mg by mouth once daily.    finasteride (PROSCAR) 5 mg tablet TAKE ONE TABLET BY MOUTH ONCE DAILY    fish oil-omega-3 fatty acids 300-1,000 mg capsule Take 1 g by mouth once daily.    memantine (NAMENDA) 10 MG Tab Take 5 mg by mouth 2 (two) times daily.    metoprolol succinate (TOPROL-XL) 50 MG 24 hr tablet TAKE ONE TABLET BY MOUTH ONCE DAILY    omeprazole  (PRILOSEC) 20 MG capsule TAKE ONE CAPSULE BY MOUTH TWICE DAILY    PNV w/o calcium-iron fum-FA (M-VIT) 27-1 mg Tab Take by mouth.    potassium citrate (UROCIT-K) 10 mEq (1,080 mg) TbSR Take 1 tablet (10 mEq total) by mouth 3 (three) times daily with meals.    rosuvastatin (CRESTOR) 5 MG tablet Take 1 tablet (5 mg total) by mouth every evening.    trospium (SANCTURA XR) 60 mg Cp24 capsule     UBIDECARENONE (COENZYME Q10) 200 mg Tab Take by mouth once daily.    vortioxetine (TRINTELLIX) 5 mg Tab Take 1 tablet by mouth once daily. (Patient taking differently: Take 1 tablet by mouth once daily. )    warfarin (COUMADIN) 5 MG tablet TAKE ONE TABLET BY MOUTH ONCE DAILY AT BEDTIME    nitroGLYCERIN (NITROSTAT) 0.4 MG SL tablet Place 1 tablet (0.4 mg total) under the tongue every 5 (five) minutes as needed for Chest pain.    [DISCONTINUED] donepezil (ARICEPT ODT) 10 mg disintegrating tablet TAKE ONE TABLET BY MOUTH ONCE DAILY IN THE EVENING     Family History     Problem Relation (Age of Onset)    Cancer Father    Diabetes Mother    Heart disease Mother (65)        Social History Main Topics    Smoking status: Never Smoker    Smokeless tobacco: Never Used    Alcohol use No    Drug use: No    Sexual activity: Not Currently     Review of Systems   Unable to perform ROS: Dementia (See HPI)     Objective:     Vital Signs (Most Recent):  Temp: 98.2 °F (36.8 °C) (12/05/17 0506)  Pulse: 86 (12/05/17 0600)  Resp: 18 (12/05/17 0506)  BP: 133/75 (12/05/17 0506)  SpO2: 98 % (12/05/17 0506) Vital Signs (24h Range):  Temp:  [98.2 °F (36.8 °C)] 98.2 °F (36.8 °C)  Pulse:  [84-91] 86  Resp:  [16-18] 18  SpO2:  [96 %-99 %] 98 %  BP: (120-153)/(56-75) 133/75     Weight: 81.6 kg (180 lb)  Body mass index is 24.41 kg/m².    Physical Exam   Constitutional: Vital signs are normal. He appears well-developed and well-nourished.  Non-toxic appearance. He does not have a sickly appearance. He does not appear ill. No distress.   HENT:    Head: Normocephalic and atraumatic.   Right Ear: External ear normal.   Left Ear: External ear normal.   Eyes: Conjunctivae and EOM are normal. Pupils are equal, round, and reactive to light. No scleral icterus.   Neck: Normal range of motion. Neck supple. No JVD present. No tracheal deviation present.   Cardiovascular: Normal rate, regular rhythm and intact distal pulses.  Exam reveals no gallop and no friction rub.    No murmur heard.  2+ distal radial pulses. No carotid bruits.  No b/l LE edema. Mechanical valve audible upon exam.    Pulmonary/Chest: Effort normal and breath sounds normal. No stridor. No respiratory distress. He has no wheezes. He has no rales.   Abdominal: Soft. Bowel sounds are normal. He exhibits distension (mild). He exhibits no mass. There is no tenderness. There is no guarding.   Musculoskeletal: Normal range of motion. He exhibits no edema or deformity.   Neurological: He is alert.   Responds to name. Not oriented to place/time/location.  The patient was alert, relaxed and cooperative. CN II-XII were grossly intact. The patient had good muscle bulk and tone with equal strength throughout bilateral extremities. Sensation was intact to light touch.  Mild tremors noted.   Skin: Skin is warm and dry. Capillary refill takes less than 2 seconds. He is not diaphoretic.   Psychiatric: He has a normal mood and affect. His behavior is normal. Judgment and thought content normal.   Nursing note and vitals reviewed.        CRANIAL NERVES     CN III, IV, VI   Pupils are equal, round, and reactive to light.  Extraocular motions are normal.        Significant Labs:   BMP:   Recent Labs  Lab 12/05/17 0056   *      K 4.1      CO2 25   BUN 28*   CREATININE 1.1   CALCIUM 10.6*     CBC:   Recent Labs  Lab 12/05/17 0056   WBC 13.01*   HGB 12.1*   HCT 36.0*   *     CMP:   Recent Labs  Lab 12/05/17 0056      K 4.1      CO2 25   *   BUN 28*   CREATININE 1.1    CALCIUM 10.6*   ANIONGAP 8   EGFRNONAA >60     Coagulation:   Recent Labs  Lab 12/05/17  0056   INR 5.0*     All pertinent labs within the past 24 hours have been reviewed.    Significant Imaging: I have reviewed all pertinent imaging results/findings within the past 24 hours.     Imaging Results          CT Lower Extremity Without Cont Right (Final result)  Result time 12/05/17 03:16:19    Final result by Luis Enrique Morrell MD (12/05/17 03:16:19)                 Impression:      Significantly motion limited examination. No obvious fracture.    Suprapatellar joint effusion.      Electronically signed by: Luis Enrique Morrell  Date:     12/05/17  Time:    03:16              Narrative:    CLINICAL INDICATION:  Hip pain.    TECHNIQUE: CT images of the left lower extremity from the left hip through the left knee were obtained. No IV or oral contrast was utilized. Images were obtained utilizing bone window algorithm, which significantly limits evaluation of the soft tissues at the expense of improved bone characterization. Multiplanar reconstructions were created.       COMPARISON: Right hip x-rays, same day.    FINDINGS:  Motion limits evaluation.    There is no displaced fracture or dislocation. No age advanced degenerative changes involving the right hip.    Motion severely limits evaluation of the right knee. No large fracture. There is a suprapatellar joint effusion.    Urinary bladder is mildly distended. Probable right hydrocele. There are vascular calcifications. There is a metallic radiopaque structure in the right thigh superficial soft tissues (axial series 5, image 20). There are vascular calcifications.                             X-Ray Abdomen AP 1 View (KUB) (Final result)  Result time 12/05/17 01:33:40    Final result by Luis Enrique Morrell MD (12/05/17 01:33:40)                 Impression:    Marked gaseous distention of the colon and a moderate volume of solid stool.      Electronically signed by: Luis Enrique  Petros  Date:     12/05/17  Time:    01:33              Narrative:    EXAM: ABDOMEN 1 VIEW    CLINICAL INDICATION:  Constipation.    COMPARISON:  None.    TECHNIQUE:  Single AP supine view of the abdomen was obtained.    FINDINGS:  There is marked gaseous distention of the colon and moderate volume of solid stool.  No large fecal burden in the rectum. Evaluation for pneumoperitoneum is limited on this supine radiograph. There are degenerative changes in the lumbar spine.                             X-Ray Hip 2 or 3 views Right (Final result)  Result time 12/05/17 01:30:50    Final result by Luis Enrique Morrell MD (12/05/17 01:30:50)                 Impression:        No acute bony abnormality.      Electronically signed by: Luis Enrique Morrell  Date:     12/05/17  Time:    01:30              Narrative:    COMPARISON: None.    CLINICAL INFORMATION: Right hip pain.    FINDINGS: Two views of the right hip.  No displaced fracture. Normal alignment at the hip joint. No age advanced degenerative changes. No radiopaque foreign body.                               Assessment/Plan:     * Unable to ambulate    - bagan after a fall at home   - patient ambulated immediately after fall and has not ambulated since  - PT/OT evals         Supratherapeutic INR    - takes coumadin for A. Fib  - followed by Dr. Gamino   - patient not able to have INR checked 2/2 unable to get out of bed/ambulate   - hold coumadin   - cardiology consulted           Hypercholesterolemia    - continue rosuvastatin (CRESTOR) 5 MG tablet  - last lipid panel   Results for TO [facility]ERS (MRN [id number]) as of 12/5/2017 06:13   9/8/2017 11:05   Cholesterol 168   HDL 31 (L)   LDL Cholesterol 107 (H)   Non HDL Chol. (LDL+VLDL) 137 (H)   Triglycerides 180 (H)             Diabetes mellitus, type 2    - last A1C: 5.8 on 9/8/17   - A1C pending for AM   - diabetic diet, SSRI, no insulin or oral meds used at home            Atrial fibrillation    - diagnosed 2004  -  s/p mitral valve replacement  - anticoagulated with coumadin  - rate controlled with metoprolol 50 mg QD   - maintain on tele           VTE Risk Mitigation         Ordered     Place MYRTLE hose  Until discontinued      12/05/17 0609     Medium Risk of VTE  Once      12/05/17 0434     Reason for No Pharmacological VTE Prophylaxis  Once      12/05/17 0434     Reason for no Mechanical VTE Prophylaxis  Once      12/05/17 0434             Lynn Kendall PA-C  Department of Hospital Medicine   [facility]

## 2017-12-05 NOTE — PLAN OF CARE
12/05/17 1054   STEVENSON Message   Medicare Outpatient and Observation Notification regarding financial responsibility Explained to patient/caregiver;Signed/date by patient/caregiver;Given to patient/caregiver   Date STEVENSON was signed 12/05/17   Time STEVENSON was signed 1057

## 2017-12-05 NOTE — ED NOTES
Pt changed and placed on clean chux pad. Pt and wife updated on plan of care. Bed locked in lowest position, side rails up x2, call light within reach. Will continue to monitor.

## 2017-12-05 NOTE — ED TRIAGE NOTES
Pt presents to ED with c/o fall last Tuesday. Wife at bedside reports pt has Hx of dementia, reports had unwitnessed fall in backyard on concrete, wife reports pt walked back into house after fall. Pt able to state name and SOB, disoriented to place and situation. Pt state she remembers falling in the backyard, denies hitting head, denies complaints at this time. Wife also reports pt was seen by a PCP and had x-rays done with no acute findings. Wife reports she brought patient here because he has not been walking around like usual, states she has to get the neighbor to help move the patient around.

## 2017-12-05 NOTE — PLAN OF CARE
Planned discharge disposition is SNF. Met with patient & son for choices-both request CM check back shortly to speak with patient's wife. Information left for them to review. Order initiated for PPD placement & CXR

## 2017-12-05 NOTE — ASSESSMENT & PLAN NOTE
- continue rosuvastatin (CRESTOR) 5 MG tablet  - last lipid panel   Results for TO CHAMBERS (MRN [id number]) as of 12/5/2017 06:13   9/8/2017 11:05   Cholesterol 168   HDL 31 (L)   LDL Cholesterol 107 (H)   Non HDL Chol. (LDL+VLDL) 137 (H)   Triglycerides 180 (H)

## 2017-12-05 NOTE — ED PROVIDER NOTES
Encounter Date: 12/5/2017    SCRIBE #1 NOTE: I, Jaquelin Castellanos, am scribing for, and in the presence of, Dr. Castaneda.       History     Chief Complaint   Patient presents with    Fall     pt had a fall yesterday and has extensive bruising his left leg. pts mouth appears to have dried up blood in it. pt is awake alert and oriented x 2. 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      Time seen by provider: 12:16 AM    This is a 77 y.o. male, with history of dementia, who was brought in for concern of constipation which started one week ago s/p fall. Per spouse who did not witness fall, she believes patient tripped over a wire in the backyard while she was inside the house. She notes that patient was able to ambulate immediately after the fall but was unable to ambulate later that night. Spouse states that the patient has not had a BM since falling and says that constipation is unchanged with miralax (given last night) and immodium. She reports associated ecchymosis to the bilateral knees and thighs, as well as weakness and decreased appetite/fluid intake. Per spouse, patient denies fever, chills, nausea, vomiting, head trauma, numbness, abdominal pain, diarrhea, cough, or congestion. Spouse reports that patient was evaluated s/p fall with X-rays performed revealing no hip fracture. She reports a PSHx of mechanical valve replacement and says that patient has been on coumadin since 2004. Of note, history is provided by spouse due to patient's dementia status.      The history is provided by the spouse.     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 BMI 26.    Stricture and stenosis of esophagus 1/22/2014    Dilation 11/01, 12/13 Dr. Caldwell     Past Surgical History:   Procedure Laterality Date    CATARACT EXTRACTION, BILATERAL      HERNIA REPAIR      KNEE ARTHROSCOPY Right 1990    MITRAL VALVE REPLACEMENT  11/04    mechanical    Trigger finger right hand  1997     Family History   Problem Relation Age of Onset    Heart disease Mother 65     MI    Diabetes Mother     Cancer Father      Social History   Substance Use Topics    Smoking status: Never Smoker    Smokeless tobacco: Never Used    Alcohol use No     Review of Systems   Constitutional: Positive for appetite change. Negative for chills and fever.   HENT: Negative for congestion and sore throat.    Respiratory: Negative for cough and shortness of breath.    Cardiovascular: Negative for chest pain.   Gastrointestinal: Positive for constipation. Negative for abdominal pain, diarrhea, nausea and vomiting.   Genitourinary: Negative for dysuria.   Musculoskeletal: Positive for gait problem (unable to ambulate).   Skin: Positive for wound (bilateral LEs). Negative for rash.   Neurological: Positive for weakness. Negative for numbness.   Hematological: Does not bruise/bleed easily.       Physical Exam     Initial Vitals [12/05/17 0012]   BP Pulse Resp Temp SpO2   (!) 153/70 87 16 98.2 °F (36.8 °C) 99 %      MAP       97.67         Physical Exam    Nursing note and vitals reviewed.  Constitutional: He appears well-developed and well-nourished. He is not diaphoretic. No distress.   HENT:   Head: Normocephalic and atraumatic.   Mouth/Throat: Oropharynx is clear and moist.   Eyes: EOM are normal. Pupils are equal, round, and reactive to light. No  scleral icterus.   Neck: Normal range of motion. Neck supple.   Cardiovascular: Normal rate, regular rhythm, normal heart sounds and intact distal pulses. Exam reveals no gallop and no friction rub.    No murmur heard.  Pulmonary/Chest: Breath sounds normal. No respiratory distress. He has no wheezes. He has no rhonchi. He has no rales.   Abdominal: Soft. Bowel sounds are normal. He exhibits no distension. There is no tenderness. There is no rebound and no guarding.   Musculoskeletal: He exhibits tenderness. He exhibits no edema.   Ecchymosis to the medial aspect of the right leg from the thigh to the mid-calf with TTP. Limited ROM of right hip secondary to pain.    Neurological: He is alert and oriented to person, place, and time.   Skin: Skin is warm and dry. Capillary refill takes less than 2 seconds. No rash and no abscess noted. No erythema. No pallor.   Psychiatric: He has a normal mood and affect. His behavior is normal. Judgment and thought content normal.         ED Course   Procedures  Labs Reviewed   PROTIME-INR - Abnormal; Notable for the following:        Result Value    Prothrombin Time 52.2 (*)     INR 5.0 (*)     All other components within normal limits    Narrative:     PT/INR   critical result(s) called and verbal readback obtained from   Tommy Wing, 12/05/2017 01:43   CBC W/ AUTO DIFFERENTIAL - Abnormal; Notable for the following:     WBC 13.01 (*)     RBC 3.87 (*)     Hemoglobin 12.1 (*)     Hematocrit 36.0 (*)     MCH 31.3 (*)     Platelets 355 (*)     Gran # 8.6 (*)     Mono # 1.3 (*)     All other components within normal limits   BASIC METABOLIC PANEL - Abnormal; Notable for the following:     Glucose 162 (*)     BUN, Bld 28 (*)     Calcium 10.6 (*)     All other components within normal limits   URINALYSIS - Abnormal; Notable for the following:     Occult Blood UA Trace (*)     All other components within normal limits   POCT GLUCOSE - Abnormal; Notable for the following:     POCT Glucose  183 (*)     All other components within normal limits   POCT GLUCOSE MONITORING CONTINUOUS        Imaging Results          CT Lower Extremity Without Cont Right (Final result)  Result time 12/05/17 03:16:19    Final result by Luis Enrique Morrell MD (12/05/17 03:16:19)                 Impression:      Significantly motion limited examination. No obvious fracture.    Suprapatellar joint effusion.      Electronically signed by: Luis Enrique Morrell  Date:     12/05/17  Time:    03:16              Narrative:    CLINICAL INDICATION:  Hip pain.    TECHNIQUE: CT images of the left lower extremity from the left hip through the left knee were obtained. No IV or oral contrast was utilized. Images were obtained utilizing bone window algorithm, which significantly limits evaluation of the soft tissues at the expense of improved bone characterization. Multiplanar reconstructions were created.       COMPARISON: Right hip x-rays, same day.    FINDINGS:  Motion limits evaluation.    There is no displaced fracture or dislocation. No age advanced degenerative changes involving the right hip.    Motion severely limits evaluation of the right knee. No large fracture. There is a suprapatellar joint effusion.    Urinary bladder is mildly distended. Probable right hydrocele. There are vascular calcifications. There is a metallic radiopaque structure in the right thigh superficial soft tissues (axial series 5, image 20). There are vascular calcifications.                             X-Ray Abdomen AP 1 View (KUB) (Final result)  Result time 12/05/17 01:33:40    Final result by Luis Enrique Morrell MD (12/05/17 01:33:40)                 Impression:    Marked gaseous distention of the colon and a moderate volume of solid stool.      Electronically signed by: Luis Enrique Morrell  Date:     12/05/17  Time:    01:33              Narrative:    EXAM: ABDOMEN 1 VIEW    CLINICAL INDICATION:  Constipation.    COMPARISON:  None.    TECHNIQUE:  Single AP supine view  of the abdomen was obtained.    FINDINGS:  There is marked gaseous distention of the colon and moderate volume of solid stool.  No large fecal burden in the rectum. Evaluation for pneumoperitoneum is limited on this supine radiograph. There are degenerative changes in the lumbar spine.                             X-Ray Hip 2 or 3 views Right (Final result)  Result time 12/05/17 01:30:50    Final result by Luis Enrique Morrell MD (12/05/17 01:30:50)                 Impression:        No acute bony abnormality.      Electronically signed by: Luis Enrique Morrell  Date:     12/05/17  Time:    01:30              Narrative:    COMPARISON: None.    CLINICAL INFORMATION: Right hip pain.    FINDINGS: Two views of the right hip.  No displaced fracture. Normal alignment at the hip joint. No age advanced degenerative changes. No radiopaque foreign body.                              X-Rays:   Independently Interpreted Readings:   Abdomen: Abdomen X-Ray Reading (3:05AM)- Dilated loops of bowel but no free air or air fluid levels.   Other Readings:  Hip X-Ray Reading (3:05AM)- No displaced fracture or dislocation.    Medical Decision Making:   Independently Interpreted Test(s):   I have ordered and independently interpreted X-rays - see prior notes.  Clinical Tests:   Lab Tests: Ordered and Reviewed  Radiological Study: Ordered and Reviewed    Additional MDM:   Comments: 77-year-old male brought in by his wife for evaluation of constipation, decreased by mouth intake, and refusal to walk/stand since he had a fall approximately one week ago.  The patient was unable to provide any history given his dementia.  On exam he had extensive ecchymosis to the medial aspect of the right lower extremity.  He also appeared to have discomfort with internal and external rotation of the right hip.  X-ray of the hip were obtained and negative for an acute process.  Subsequently, an attempt was made to have the patient stand.  Per the nurse, the patient's  legs buckled immediately after standing and he was unable to ambulate.  Given persistent concern for possible fracture, a CT of the hip and femur were obtained and negative for a fracture.  Labs were significant for a supratherapeutic INR at 5.  And an x-ray of his abdomen was obtained giving the wife's concern for constipation over the past 8 days.  X-ray showed dilated loops of bowel but no evidence of a small bowel obstruction and there was moderate amount of stool present.  Patient was admitted to the hospitalist service given inability to walk..          Scribe Attestation:   Scribe #1: I performed the above scribed service and the documentation accurately describes the services I performed. I attest to the accuracy of the note.    Attending Attestation:           Physician Attestation for Scribe:  Physician Attestation Statement for Scribe #1: I, Dr. Castaneda, reviewed documentation, as scribed by Jaquelin Castellanos in my presence, and it is both accurate and complete.                 ED Course      Clinical Impression:     1. Unable to ambulate    2. Constipation    3. Supratherapeutic INR                                 Gina Castaneda MD  12/05/17 0572

## 2017-12-05 NOTE — PLAN OF CARE
SW met with pt at bedside to complete discharge assessment; pt was alert but confused and disoriented; wife answered questions.  Pt has walker and wheelchair at home.  Wife is requested HH upon discharge.     12/05/17 1054   Discharge Assessment   Assessment Type Discharge Planning Assessment   Confirmed/corrected address and phone number on facesheet? Yes   Assessment information obtained from? Caregiver   Communicated expected length of stay with patient/caregiver no   Prior to hospitilization cognitive status: Not Oriented to Place;Not Oriented to Time   Prior to hospitalization functional status: Assistive Equipment;Needs Assistance   Current cognitive status: Not Oriented to Place;Not Oriented to Time   Current Functional Status: Assistive Equipment;Needs Assistance   Lives With spouse   Able to Return to Prior Arrangements yes   Is patient able to care for self after discharge? No   Who are your caregiver(s) and their phone number(s)? (Lesli, wife, [phone])   Patient's perception of discharge disposition home health   Readmission Within The Last 30 Days no previous admission in last 30 days   Patient currently being followed by outpatient case management? No   Patient currently receives any other outside agency services? No   Equipment Currently Used at Home walker, rolling;wheelchair   Do you have any problems affording any of your prescribed medications? No   Is the patient taking medications as prescribed? yes   Does the patient have transportation home? Yes   Does the patient receive services at the [facility]? Yes  (Blood draw at [facility] and results sent to Dr. Syed)   Discharge Plan A Home Health   Patient/Family In Agreement With Plan yes

## 2017-12-05 NOTE — PLAN OF CARE
Problem: Physical Therapy Goal  Goal: Physical Therapy Goal  Goals to be met by: 12/15/17  //  Patient will increase functional independence with mobility by performin. Supine to sit with Contact Guard Assistance  2. Sit to supine with Contact Guard Assistance  3. Sit to stand transfer with Contact Guard Assistance  4. Bed to chair transfer with Contact Guard Assistance using Rolling Walker  5. Gait  x 50 feet with Contact Guard Assistance using Rolling Walker.     Outcome: Ongoing (interventions implemented as appropriate)  PT eval.  R LE is bruised and swollen , grimaces with ROM of leg despite saying he has no pain.  Requiring mod a for all functional mobility.  Pt was at S level for amb without AD  until fall on Tu.  Decreased endurance.  Wife would be unable to care for him at home at this point.   REC:  SNF

## 2017-12-05 NOTE — PT/OT/SLP EVAL
Physical Therapy Evaluation    Patient Name:  Rikki Morrison   MRN:  [id number]    Recommendations:     Discharge Recommendations:  nursing facility, skilled   Discharge Equipment Recommendations: none   Barriers to discharge: Decreased caregiver support    Assessment:     Rikki Morrison is a 77 y.o. male admitted with a medical diagnosis of Unable to ambulate.  He presents with the following impairments/functional limitations:  weakness, impaired endurance, impaired self care skills, impaired functional mobilty, gait instability, impaired balance, impaired cognition, decreased safety awareness, pain, decreased lower extremity function, decreased ROM, impaired skin, edema .  Pt is high fall risk.  Increased CG burden at this time.  Needs post acute therapy    Rehab Prognosis:  good; patient would benefit from acute skilled PT services to address these deficits and reach maximum level of function.      Recent Surgery: * No surgery found *      Plan:     During this hospitalization, patient to be seen 6 x/week to address the above listed problems via gait training, therapeutic activities, therapeutic exercises, neuromuscular re-education  · Plan of Care Expires:  01/04/18   Plan of Care Reviewed with: patient, spouse    Subjective     Communicated with nursing prior to session.  Patient found in bed with HOB elevated upon PT entry to room, agreeable to evaluation.      Chief Complaint: tired , wife states patient was unable to walk at home-she was unable to handle him.    Patient comments/goals: none stated,  per wife wants PLOF   Pain/Comfort:  · Pain Rating 1: 0/10  · Pain Rating Post-Intervention 1: 0/10    Patients cultural, spiritual, Jewish conflicts given the current situation: no    Living Environment:  Pt lives with wife in 1 story house with 1 SENA.  Has shower stall with SC and suction cup grab bars .    Prior to admission(after fall) pt was amb just a few steps with RW and wife using wc to get pt to  car. Prior to fall on Tues, patients level of function was able to transfer and  amb without AD and with S for safety only.  .  Wife had begun to assist with bathing recently , using diapers.  .  Patient has the following equipment: walker, rolling, wheelchair, shower chair (since fall ).  DME owned (not currently used): bedside commode.  Upon discharge, patient will have assistance from spouse.    Objective:     Patient found with: peripheral IV, SCD     General Precautions: Standard, fall   Orthopedic Precautions:N/A   Braces: N/A     Exams:  · Cognitive Exam:  Patient is oriented to Person and follows <50% of one step  commands with visual and tactile cues   · Gross Motor Coordination:  Impaired in BLE, rigid and ataxic  · Postural Exam:  Patient presented with the following abnormalities:    · -       Rounded shoulders  · -       Forward head  · -       Posterior pelvic tilt  · Sensation: denies paraesthesias in BLE  · Skin Integrity/Edema:  bruising to post and medial thigh and lower leg on R, edema to RLE as compared to LLE.   · RLE ROM: decreased in hip and knee and ankle to neutral.  Mild grimacing with PROM   · RLE Strength: Deficits: decreased associated with pain.  Unable to MMT 2* cognition  · LLE ROM: WFL passively  · LLE Strength: unable to MMT 2* cognition, grossly in 3-/5 range    Functional Mobility:  · Bed Mobility:     · Supine to Sit: moderate assistance  · Sit to Supine: contact guard assistance  · Transfers:     · Sit to Stand:  moderate assistance with rolling walker  · Gait: pt amb 10' with RW and max verbal, visual and tatile cues-small shuffling gait with narrow BILL-retropulsion.  assist to wt shift and for walker management.  requires mod a  · Balance: poor dynamic balance in sit and stand-posterior lean  ·     AM-PAC 6 CLICK MOBILITY  Total Score:11       Therapeutic Activities and Exercises:   post lean in sitting EOB    Patient left up in chair with all lines intact, call button in  reach and nursing notified.    GOALS:    Physical Therapy Goals        Problem: Physical Therapy Goal    Goal Priority Disciplines Outcome Goal Variances Interventions   Physical Therapy Goal     PT/OT, PT Ongoing (interventions implemented as appropriate)     Description:  Goals to be met by: 12/15/17  /  Patient will increase functional independence with mobility by performin. Supine to sit with Contact Guard Assistance  2. Sit to supine with Contact Guard Assistance  3. Sit to stand transfer with Contact Guard Assistance  4. Bed to chair transfer with Contact Guard Assistance using Rolling Walker  5. Gait  x 50 feet with Contact Guard Assistance using Rolling Walker.                       History:     Past Medical History:   Diagnosis Date    *Atrial fibrillation     Abnormal echocardiogram 2012    Atrial fibrillation 2012    Bacterial endocarditis 2012    Diabetes mellitus type II     DM (diabetes mellitus) 2012    Family history of premature CAD 2012    GERD (gastroesophageal reflux disease) 2012    HDL lipoprotein deficiency 2012    Hearing loss, bilateral 2014    Bilat aids    History of mitral valve replacement 2012    Hyperlipidemia     Kidney stones 2012    Overweight 3/22/2017    3/22/2017: Weight 84 kg. BMI 26.    Stricture and stenosis of esophagus 2014    Dilation ,  Dr. Caldwell       Past Surgical History:   Procedure Laterality Date    CATARACT EXTRACTION, BILATERAL      HERNIA REPAIR      KNEE ARTHROSCOPY Right     MITRAL VALVE REPLACEMENT      mechanical    Trigger finger right hand         Clinical Decision Making:     History  Co-morbidities and personal factors that may impact the plan of care Examination  Body Structures and Functions, activity limitations and participation restrictions that may impact the plan of care Clinical Presentation   Decision Making/ Complexity Score    Co-morbidities:   [x] Time since onset of injury / illness / exacerbation  [x] Status of current condition  [x]Patient's cognitive status and safety concerns    [x] Multiple Medical Problems (see med hx)  Personal Factors:   [x] Patient's age  [x] Prior Level of function   [x] Patient's home situation (environment and family support)  [] Patient's level of motivation  [] Expected progression of patient      HISTORY:(criteria)    [] 74905 - no personal factors/history    [] 13473 - has 1-2 personal factor/comorbidity     [x] 56207 - has >3 personal factor/comorbidity     Body Regions:  [] Objective examination findings  [] Head     []  Neck  [] Trunk   [] Upper Extremity  [x] Lower Extremity    Body Systems:  [] For communication ability, affect, cognition, language, and learning style: the assessment of the ability to make needs known, consciousness, orientation (person, place, and time), expected emotional /behavioral responses, and learning preferences (eg, learning barriers, education  needs)  [x] For the neuromuscular system: a general assessment of gross coordinated movement (eg, balance, gait, locomotion, transfers, and transitions) and motor function  (motor control and motor learning)  [x] For the musculoskeletal system: the assessment of gross symmetry, gross range of motion, gross strength, height, and weight  [] For the integumentary system: the assessment of pliability(texture), presence of scar formation, skin color, and skin integrity  [] For cardiovascular/pulmonary system: the assessment of heart rate, respiratory rate, blood pressure, and edema     Activity limitations:    [x] Patient's cognitive status and saf ety concerns          [x] Status of current condition      [] Weight bearing restriction  [] Cardiopulmunary Restriction    Participation Restrictions:   [] Goals and goal agreement with the patient     [] Rehab potential (prognosis) and probable outcome      Examination of Body System:  (criteria)    [] 52662 - addressing 1-2 elements    [x] 63401 - addressing a total of 3 or more elements     [] 77085 -  Addressing a total of 4 or more elements         Clinical Presentation: (criteria)  Evolving - 76205     On examination of body system using standardized tests and measures patient presents with 3 or more elements from any of the following: body structures and functions, activity limitations, and/or participation restrictions.  Leading to a clinical presentation that is considered unstable with unpredictable characteristics                              Clinical Decision Making  (Eval Complexity):  Moderate - 14635     Time Tracking:     PT Received On: 12/05/17  PT Start Time: 1120     PT Stop Time: 1207  PT Total Time (min): 47 min     Billable Minutes: Evaluation 17, Gait Training 10 and Therapeutic Activity 20      Kinjal Mejia, PT  12/05/2017

## 2017-12-05 NOTE — ASSESSMENT & PLAN NOTE
- lizzie after a fall at home   - patient ambulated immediately after fall and has not ambulated since  - PT/OT jan

## 2017-12-05 NOTE — SUBJECTIVE & OBJECTIVE
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 BMI 26.    Stricture and stenosis of esophagus 1/22/2014    Dilation 11/01, 12/13 Dr. Caldwell       Past Surgical History:   Procedure Laterality Date    CATARACT EXTRACTION, BILATERAL      HERNIA REPAIR      KNEE ARTHROSCOPY Right 1990    MITRAL VALVE REPLACEMENT  11/04    mechanical    Trigger finger right hand  1997       Review of patient's allergies indicates:   Allergen Reactions    Pcn [penicillins] Rash       No current facility-administered medications on file prior to encounter.      Current Outpatient Prescriptions on File Prior to Encounter   Medication Sig    amlodipine (NORVASC) 2.5 MG tablet TAKE ONE TABLET BY MOUTH ONCE DAILY    aspirin (ECOTRIN) 81 MG EC tablet Take 1 tablet (81 mg total) by mouth once daily.    cholecalciferol, vitamin D3, (VITAMIN D3) 1,000 unit capsule Take 1,000 Units by mouth once daily.    cyanocobalamin, vitamin B-12, 1,000 mcg Subl Place 1 tablet under the tongue once daily.    ferrous sulfate 325 (65 FE) MG EC tablet Take 325 mg by mouth once daily.    finasteride (PROSCAR) 5 mg tablet TAKE ONE TABLET BY MOUTH ONCE DAILY    fish oil-omega-3 fatty acids 300-1,000 mg capsule Take 1 g by mouth once daily.    memantine (NAMENDA) 10 MG Tab Take 5 mg by mouth 2 (two) times daily.    metoprolol succinate (TOPROL-XL) 50 MG 24 hr tablet TAKE ONE TABLET BY MOUTH ONCE DAILY    omeprazole  (PRILOSEC) 20 MG capsule TAKE ONE CAPSULE BY MOUTH TWICE DAILY    PNV w/o calcium-iron fum-FA (M-VIT) 27-1 mg Tab Take by mouth.    potassium citrate (UROCIT-K) 10 mEq (1,080 mg) TbSR Take 1 tablet (10 mEq total) by mouth 3 (three) times daily with meals.    rosuvastatin (CRESTOR) 5 MG tablet Take 1 tablet (5 mg total) by mouth every evening.    trospium (SANCTURA XR) 60 mg Cp24 capsule     UBIDECARENONE (COENZYME Q10) 200 mg Tab Take by mouth once daily.    vortioxetine (TRINTELLIX) 5 mg Tab Take 1 tablet by mouth once daily. (Patient taking differently: Take 1 tablet by mouth once daily. )    warfarin (COUMADIN) 5 MG tablet TAKE ONE TABLET BY MOUTH ONCE DAILY AT BEDTIME    nitroGLYCERIN (NITROSTAT) 0.4 MG SL tablet Place 1 tablet (0.4 mg total) under the tongue every 5 (five) minutes as needed for Chest pain.    [DISCONTINUED] donepezil (ARICEPT ODT) 10 mg disintegrating tablet TAKE ONE TABLET BY MOUTH ONCE DAILY IN THE EVENING     Family History     Problem Relation (Age of Onset)    Cancer Father    Diabetes Mother    Heart disease Mother (65)        Social History Main Topics    Smoking status: Never Smoker    Smokeless tobacco: Never Used    Alcohol use No    Drug use: No    Sexual activity: Not Currently     Review of Systems   Unable to perform ROS: Dementia (See HPI)     Objective:     Vital Signs (Most Recent):  Temp: 98.2 °F (36.8 °C) (12/05/17 0506)  Pulse: 86 (12/05/17 0600)  Resp: 18 (12/05/17 0506)  BP: 133/75 (12/05/17 0506)  SpO2: 98 % (12/05/17 0506) Vital Signs (24h Range):  Temp:  [98.2 °F (36.8 °C)] 98.2 °F (36.8 °C)  Pulse:  [84-91] 86  Resp:  [16-18] 18  SpO2:  [96 %-99 %] 98 %  BP: (120-153)/(56-75) 133/75     Weight: 81.6 kg (180 lb)  Body mass index is 24.41 kg/m².    Physical Exam   Constitutional: Vital signs are normal. He appears well-developed and well-nourished.  Non-toxic appearance. He does not have a sickly appearance. He does not appear ill. No distress.   HENT:    Head: Normocephalic and atraumatic.   Right Ear: External ear normal.   Left Ear: External ear normal.   Eyes: Conjunctivae and EOM are normal. Pupils are equal, round, and reactive to light. No scleral icterus.   Neck: Normal range of motion. Neck supple. No JVD present. No tracheal deviation present.   Cardiovascular: Normal rate, regular rhythm and intact distal pulses.  Exam reveals no gallop and no friction rub.    No murmur heard.  2+ distal radial pulses. No carotid bruits.  No b/l LE edema. Mechanical valve audible upon exam.    Pulmonary/Chest: Effort normal and breath sounds normal. No stridor. No respiratory distress. He has no wheezes. He has no rales.   Abdominal: Soft. Bowel sounds are normal. He exhibits distension (mild). He exhibits no mass. There is no tenderness. There is no guarding.   Musculoskeletal: Normal range of motion. He exhibits no edema or deformity.   Neurological: He is alert.   Responds to name. Not oriented to place/time/location.  The patient was alert, relaxed and cooperative. CN II-XII were grossly intact. The patient had good muscle bulk and tone with equal strength throughout bilateral extremities. Sensation was intact to light touch.  Mild tremors noted.   Skin: Skin is warm and dry. Capillary refill takes less than 2 seconds. He is not diaphoretic.   Psychiatric: He has a normal mood and affect. His behavior is normal. Judgment and thought content normal.   Nursing note and vitals reviewed.        CRANIAL NERVES     CN III, IV, VI   Pupils are equal, round, and reactive to light.  Extraocular motions are normal.        Significant Labs:   BMP:   Recent Labs  Lab 12/05/17 0056   *      K 4.1      CO2 25   BUN 28*   CREATININE 1.1   CALCIUM 10.6*     CBC:   Recent Labs  Lab 12/05/17 0056   WBC 13.01*   HGB 12.1*   HCT 36.0*   *     CMP:   Recent Labs  Lab 12/05/17 0056      K 4.1      CO2 25   *   BUN 28*   CREATININE 1.1    CALCIUM 10.6*   ANIONGAP 8   EGFRNONAA >60     Coagulation:   Recent Labs  Lab 12/05/17  0056   INR 5.0*     All pertinent labs within the past 24 hours have been reviewed.    Significant Imaging: I have reviewed all pertinent imaging results/findings within the past 24 hours.     Imaging Results          CT Lower Extremity Without Cont Right (Final result)  Result time 12/05/17 03:16:19    Final result by Luis Enrique Morrell MD (12/05/17 03:16:19)                 Impression:      Significantly motion limited examination. No obvious fracture.    Suprapatellar joint effusion.      Electronically signed by: Luis Enrique Morrell  Date:     12/05/17  Time:    03:16              Narrative:    CLINICAL INDICATION:  Hip pain.    TECHNIQUE: CT images of the left lower extremity from the left hip through the left knee were obtained. No IV or oral contrast was utilized. Images were obtained utilizing bone window algorithm, which significantly limits evaluation of the soft tissues at the expense of improved bone characterization. Multiplanar reconstructions were created.       COMPARISON: Right hip x-rays, same day.    FINDINGS:  Motion limits evaluation.    There is no displaced fracture or dislocation. No age advanced degenerative changes involving the right hip.    Motion severely limits evaluation of the right knee. No large fracture. There is a suprapatellar joint effusion.    Urinary bladder is mildly distended. Probable right hydrocele. There are vascular calcifications. There is a metallic radiopaque structure in the right thigh superficial soft tissues (axial series 5, image 20). There are vascular calcifications.                             X-Ray Abdomen AP 1 View (KUB) (Final result)  Result time 12/05/17 01:33:40    Final result by Luis Enrique Morrell MD (12/05/17 01:33:40)                 Impression:    Marked gaseous distention of the colon and a moderate volume of solid stool.      Electronically signed by: Luis Enrique  ePtros  Date:     12/05/17  Time:    01:33              Narrative:    EXAM: ABDOMEN 1 VIEW    CLINICAL INDICATION:  Constipation.    COMPARISON:  None.    TECHNIQUE:  Single AP supine view of the abdomen was obtained.    FINDINGS:  There is marked gaseous distention of the colon and moderate volume of solid stool.  No large fecal burden in the rectum. Evaluation for pneumoperitoneum is limited on this supine radiograph. There are degenerative changes in the lumbar spine.                             X-Ray Hip 2 or 3 views Right (Final result)  Result time 12/05/17 01:30:50    Final result by Luis Enrique Morrell MD (12/05/17 01:30:50)                 Impression:        No acute bony abnormality.      Electronically signed by: Luis Enrique Morrell  Date:     12/05/17  Time:    01:30              Narrative:    COMPARISON: None.    CLINICAL INFORMATION: Right hip pain.    FINDINGS: Two views of the right hip.  No displaced fracture. Normal alignment at the hip joint. No age advanced degenerative changes. No radiopaque foreign body.

## 2017-12-05 NOTE — HPI
Mr. Rikki Morrison is a 77 y.o. male, with history of dementia, who was brought in for concern of constipation which started one week ago s/p fall. Per spouse who did not witness fall, she believes patient tripped over a wire in the backyard while she was inside the house. She notes that patient was able to ambulate immediately after the fall but was unable to ambulate later that night. Spouse states that the patient has not had a BM since falling and says that constipation is unchanged with half dose of miralax (given last night). She had been giving him imodium prior to the onset of constipation for diarrhea. She reports associated ecchymosis to the bilateral knees and thighs, as well as weakness and decreased appetite/fluid intake. She states she has been unable to have his INR checked on 11/30/17 as scheduled as he has been unable to ambulate to the car. Per spouse, patient denies fever, chills, nausea, vomiting, head trauma, numbness, abdominal pain, diarrhea, cough, or congestion. Spouse reports that patient was evaluated s/p fall with X-rays performed revealing no hip fracture. She reports a PSHx of mechanical valve replacement and says that patient has been on coumadin since 2004. Of note, history is provided by spouse due to patient's dementia status.

## 2017-12-05 NOTE — PLAN OF CARE
Problem: Occupational Therapy Goal  Goal: Occupational Therapy Goal  Goals to be met by: 12/15/2017  Patient will increase functional independence with ADLs by performing:    Feeding with Supervision.  UE Dressing with Contact Guard Assistance.  LE Dressing with Moderate Assistance.  Grooming while seated with Stand-by Assistance.  Toileting from bedside commode with Maximum Assistance for hygiene and clothing management.   Rolling to Bilateral with Stand-by Assistance.   Supine to sit with Stand-by Assistance.  Stand pivot transfers with Contact Guard Assistance.  Toilet transfer to bedside commode with Contact Guard Assistance.  Increased functional strength to WFL for BUE.  Upper extremity exercise program 10 reps per handout, with assistance as needed.    Outcome: Ongoing (interventions implemented as appropriate)  Pt. presents with rigidity, UE tremors, ataxic LE movement with mobility, impairied sitting balance with noted retropulsion, impaired cognition-initiation, impaired processing, follows simple step commands limiting indep and safety in ADLs and fx ambulation/transfers. Pt. Is a high fall risk. He would benefit from SNF for continued therapy. Pt. Is a heavy burden of care for wife at this time.  Pt has hx dementia but was at supervision level with ADLS and ambulation at home. Continue with OT POC.

## 2017-12-05 NOTE — PT/OT/SLP EVAL
Occupational Therapy   Evaluation    Name: Rikki Morrison  MRN: [id number]  Admitting Diagnosis:  Unable to ambulate      Recommendations:     Discharge Recommendations: nursing facility, skilled  Discharge Equipment Recommendations:  none  Barriers to discharge:  Decreased caregiver support    History:     Occupational Profile:  Living Environment: Pt lives with wife in 1 story house with 1 SENA.  Has shower stall with SC and suction cup grab bars  .    Previous level of function: Prior to admission(after fall) pt was amb just a few steps with RW and wife using wc to get pt to car. Prior to fall on Tues, patients level of function was able to transfer and  amb without AD and with S for safety only; supervision for basic ADLS.  Wife had begun to assist with bathing recently , using diapers.    Roles and Routines: likes to go in garage and feed dog.  Equipment Owned:  walker, rolling, shower chair, wheelchair . DME owned (not currently used): bedside commode.    Assistance upon Discharge: spoue but pt now heavy burden of care .  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 BMI 26.    Stricture and stenosis of esophagus 1/22/2014    Dilation 11/01, 12/13 Dr. Caldwell       Past Surgical History:   Procedure Laterality Date    CATARACT EXTRACTION, BILATERAL      HERNIA REPAIR      KNEE ARTHROSCOPY Right 1990    MITRAL VALVE  REPLACEMENT  11/04    mechanical    Trigger finger right hand  1997       Subjective     Chief Complaint: none  Patient/Family stated goals: none  Communicated with: nurse and PT prior to session.  Pain/Comfort:  · Pain Rating 1: 0/10  · Pain Rating Post-Intervention 1: 0/10    Objective:     Patient found with: bed alarm, SCD, telemetry    General Precautions: Standard, fall, hearing impaired   Orthopedic Precautions:N/A   Braces: N/A     Occupational Performance:    Bed Mobility:    · Patient completed Rolling/Turning to Left with  minimum assistance, with side rail and max vc for fololw thriough with instructions to use bed rails  · Patient completed Scooting/Bridging with minimum assistance  · Patient completed Supine to Sit with minimum assistance-max vc for tech  · Patient completed Sit to Supine with minimum assistance-max vc for tech    Functional Mobility/Transfers:  · Patient completed Sit <> Stand Transfer with moderate assistance  with  rolling walker -max vc for tech  · Patient completed Toilet Transfer Stand Pivot technique with moderate assistance with  grab bars and rolling walker-max vc for tech    Activities of Daily Living:  · Feeding:  maximal assistance wife fed pt  · Grooming: minimum assistance for face only   · LB Dressing: total assistance socks  · Toileting: dependence all aspects poor following instructions for tech    Cognitive/Visual Perceptual:  Cognitive/Psychosocial Skills:     -       Oriented to: Person   -       Follows Commands/attention:Follows one-step commands-50% simple commands  -       Communication: clear/fluent  -       Memory: Impaired STM, Impaired LTM and Poor immediate recall  -       Safety awareness/insight to disability: impaired   -       Mood/Affect/Coping skills/emotional control: Appropriate to situation    Physical Exam:  Balance:    -       static sitting:  Fair-  Dynamic sitting:  Poor  Static Standing:  Poor-Moderate Assist    Patient left supine with all  "lines intact, call button in reach, bed alarm on, sitter for safety and son notified and same present    [facility] 6 Click:  [facility] Total Score: 8    Treatment & Education:  Role fo OT and POC; fx ambulation mod assist with RW to bathroom; poor foard flexed posture; ataxic /rigid posture; max vc for upright posture; knees flexed slightly.   Education:    Assessment:     Rikki Morrison is a 77 y.o. male with a medical diagnosis of Unable to ambulate.  He presents with rigidity, UE tremors, ataxic LE movement with mobility, impairied sitting balance with noted retropulsion, impaired cognition-initiation, impaired processing, follows simple step commands limiting indep and safety in ADLs and fx ambulation/transfers. Pt. Is a high fall risk. He would benefit from SNF for continued therapy. Pt. Is a heavy burden of care for wife at this time.  Pt has hx dementia but was at supervision level with ADLS and ambulation at home. Continue with OT POC.     Performance deficits affecting function are weakness, impaired self care skills, impaired balance, decreased coordination, decreased safety awareness, decreased ROM, impaired skin, impaired endurance, impaired functional mobilty, decreased upper extremity function, decreased lower extremity function, impaired cognition, gait instability, impaired coordination.      Rehab Prognosis: good; patient would benefit from acute skilled OT services to address these deficits and reach maximum level of function.         Clinical Decision Makin.  OT Mod:  "Pt evaluation falls under moderate complexity for evaluation coding due to identification of 3-5 performance deficits noted as stated above. Eval required Min/Mod assistance to complete on this date and detailed assessment(s) were utilized. Moreover, an expanded review of history and occupational profile obtained with additional review of cognitive, physical and psychosocial hx."     Plan:     Patient to be seen 6 x/week to " address the above listed problems via self-care/home management, therapeutic activities, therapeutic exercises  · Plan of Care Expires: 01/05/18  · Plan of Care Reviewed with: patient, son    This Plan of care has been discussed with the patient who was involved in its development and understands and is in agreement with the identified goals and treatment plan    GOALS:    Occupational Therapy Goals        Problem: Occupational Therapy Goal    Goal Priority Disciplines Outcome Interventions   Occupational Therapy Goal     OT, PT/OT Ongoing (interventions implemented as appropriate)    Description:  Goals to be met by: 12/15/2017  Patient will increase functional independence with ADLs by performing:    Feeding with Supervision.  UE Dressing with Contact Guard Assistance.  LE Dressing with Moderate Assistance.  Grooming while seated with Stand-by Assistance.  Toileting from bedside commode with Maximum Assistance for hygiene and clothing management.   Rolling to Bilateral with Stand-by Assistance.   Supine to sit with Stand-by Assistance.  Stand pivot transfers with Contact Guard Assistance.  Toilet transfer to bedside commode with Contact Guard Assistance.  Increased functional strength to WFL for BUE.  Upper extremity exercise program 10 reps per handout, with assistance as needed.                      Time Tracking:     OT Date of Treatment: 12/05/17  OT Start Time: 1440  OT Stop Time: 1503  OT Total Time (min): 23 min    Billable Minutes:Evaluation 15  Therapeutic Activity 8  Total Time 23    Alia Szymanski OT  12/5/2017

## 2017-12-06 PROBLEM — R26.9 GAIT DISTURBANCE: Status: ACTIVE | Noted: 2017-12-05

## 2017-12-06 LAB
ANION GAP SERPL CALC-SCNC: 6 MMOL/L
BASOPHILS # BLD AUTO: 0.02 K/UL
BASOPHILS NFR BLD: 0.2 %
BUN SERPL-MCNC: 24 MG/DL
CALCIUM SERPL-MCNC: 9.6 MG/DL
CHLORIDE SERPL-SCNC: 109 MMOL/L
CO2 SERPL-SCNC: 24 MMOL/L
CREAT SERPL-MCNC: 0.9 MG/DL
DIFFERENTIAL METHOD: ABNORMAL
EOSINOPHIL # BLD AUTO: 0.5 K/UL
EOSINOPHIL NFR BLD: 4.6 %
ERYTHROCYTE [DISTWIDTH] IN BLOOD BY AUTOMATED COUNT: 14.4 %
EST. GFR  (AFRICAN AMERICAN): >60 ML/MIN/1.73 M^2
EST. GFR  (NON AFRICAN AMERICAN): >60 ML/MIN/1.73 M^2
GLUCOSE SERPL-MCNC: 118 MG/DL
HCT VFR BLD AUTO: 34.8 %
HGB BLD-MCNC: 11.1 G/DL
INR PPP: 3.3
LYMPHOCYTES # BLD AUTO: 2.3 K/UL
LYMPHOCYTES NFR BLD: 23.5 %
MAGNESIUM SERPL-MCNC: 2.2 MG/DL
MCH RBC QN AUTO: 30 PG
MCHC RBC AUTO-ENTMCNC: 31.9 G/DL
MCV RBC AUTO: 94 FL
MONOCYTES # BLD AUTO: 1 K/UL
MONOCYTES NFR BLD: 9.8 %
NEUTROPHILS # BLD AUTO: 6.1 K/UL
NEUTROPHILS NFR BLD: 61.3 %
PHOSPHATE SERPL-MCNC: 3.1 MG/DL
PLATELET # BLD AUTO: 326 K/UL
PMV BLD AUTO: 9.1 FL
POCT GLUCOSE: 148 MG/DL (ref 70–110)
POCT GLUCOSE: 214 MG/DL (ref 70–110)
POTASSIUM SERPL-SCNC: 3.7 MMOL/L
PROTHROMBIN TIME: 34.9 SEC
RBC # BLD AUTO: 3.7 M/UL
SODIUM SERPL-SCNC: 139 MMOL/L
WBC # BLD AUTO: 9.96 K/UL

## 2017-12-06 PROCEDURE — 25000003 PHARM REV CODE 250: Performed by: PHYSICIAN ASSISTANT

## 2017-12-06 PROCEDURE — 97530 THERAPEUTIC ACTIVITIES: CPT

## 2017-12-06 PROCEDURE — 36415 COLL VENOUS BLD VENIPUNCTURE: CPT

## 2017-12-06 PROCEDURE — 97535 SELF CARE MNGMENT TRAINING: CPT

## 2017-12-06 PROCEDURE — 85025 COMPLETE CBC W/AUTO DIFF WBC: CPT

## 2017-12-06 PROCEDURE — 97116 GAIT TRAINING THERAPY: CPT

## 2017-12-06 PROCEDURE — 85610 PROTHROMBIN TIME: CPT

## 2017-12-06 PROCEDURE — 80048 BASIC METABOLIC PNL TOTAL CA: CPT

## 2017-12-06 PROCEDURE — 84100 ASSAY OF PHOSPHORUS: CPT

## 2017-12-06 PROCEDURE — 83735 ASSAY OF MAGNESIUM: CPT

## 2017-12-06 PROCEDURE — G0378 HOSPITAL OBSERVATION PER HR: HCPCS

## 2017-12-06 RX ADMIN — BISACODYL 10 MG: 10 SUPPOSITORY RECTAL at 08:12

## 2017-12-06 RX ADMIN — ASPIRIN 81 MG: 81 TABLET, COATED ORAL at 08:12

## 2017-12-06 RX ADMIN — FINASTERIDE 5 MG: 5 TABLET, FILM COATED ORAL at 08:12

## 2017-12-06 RX ADMIN — MEMANTINE HYDROCHLORIDE 5 MG: 5 TABLET ORAL at 09:12

## 2017-12-06 RX ADMIN — QUETIAPINE FUMARATE 25 MG: 25 TABLET, FILM COATED ORAL at 08:12

## 2017-12-06 RX ADMIN — METOPROLOL SUCCINATE 50 MG: 50 TABLET, EXTENDED RELEASE ORAL at 08:12

## 2017-12-06 RX ADMIN — POLYETHYLENE GLYCOL 3350 17 G: 17 POWDER, FOR SOLUTION ORAL at 08:12

## 2017-12-06 RX ADMIN — AMLODIPINE BESYLATE 2.5 MG: 2.5 TABLET ORAL at 08:12

## 2017-12-06 RX ADMIN — ROSUVASTATIN CALCIUM 5 MG: 5 TABLET ORAL at 09:12

## 2017-12-06 RX ADMIN — MEMANTINE HYDROCHLORIDE 5 MG: 5 TABLET ORAL at 08:12

## 2017-12-06 NOTE — PLAN OF CARE
Met with wife at bedside who reports SNF preferences   1) [facility]  2) [facility]  3) [facility]    Referrals sent via [facility].  Cliniclas sent to [facility] to initiate auth

## 2017-12-06 NOTE — SUBJECTIVE & OBJECTIVE
Interval History: The patient is unable to bear weight, but no other problems reported    Review of Systems   Unable to perform ROS: Dementia     Objective:     Vital Signs (Most Recent):  Temp: 97.6 °F (36.4 °C) (12/06/17 0405)  Pulse: 80 (12/06/17 0405)  Resp: 20 (12/06/17 0028)  BP: 133/73 (12/06/17 0405)  SpO2: 95 % (12/06/17 0405) Vital Signs (24h Range):  Temp:  [96.5 °F (35.8 °C)-98.3 °F (36.8 °C)] 97.6 °F (36.4 °C)  Pulse:  [71-93] 80  Resp:  [17-20] 20  SpO2:  [95 %-100 %] 95 %  BP: (115-133)/(61-73) 133/73     Weight: 81.6 kg (180 lb)  Body mass index is 24.41 kg/m².  No intake or output data in the 24 hours ending 12/06/17 0756   Physical Exam   Constitutional: Vital signs are normal. He appears well-developed and well-nourished.  Non-toxic appearance. He does not have a sickly appearance. He does not appear ill. No distress.   HENT:   Head: Normocephalic and atraumatic.   Right Ear: External ear normal.   Left Ear: External ear normal.   Eyes: Conjunctivae and EOM are normal. Pupils are equal, round, and reactive to light. No scleral icterus.   Neck: Normal range of motion. Neck supple. No JVD present. No tracheal deviation present.   Cardiovascular: Normal rate, regular rhythm and intact distal pulses.  Exam reveals no gallop and no friction rub.    No murmur heard.  2+ distal radial pulses. No carotid bruits.  No b/l LE edema. Mechanical valve audible upon exam.    Pulmonary/Chest: Effort normal and breath sounds normal. No stridor. No respiratory distress. He has no wheezes. He has no rales.   Abdominal: Soft. Bowel sounds are normal. He exhibits distension (mild). He exhibits no mass. There is no tenderness. There is no guarding.   Musculoskeletal: Normal range of motion. He exhibits no edema or deformity.   Neurological: He is alert.   Responds to name. Not oriented to place/time/location.  The patient was alert, relaxed and cooperative. CN II-XII were grossly intact. The patient had good muscle  bulk and tone with equal strength throughout bilateral extremities. Sensation was intact to light touch.  Mild tremors noted.   Skin: Skin is warm and dry. Capillary refill takes less than 2 seconds. He is not diaphoretic.   Healing wound on right elbow   Psychiatric: He has a normal mood and affect. His behavior is normal. Judgment and thought content normal.   Nursing note and vitals reviewed.      Significant Labs: All pertinent labs within the past 24 hours have been reviewed.    Significant Imaging: I have reviewed and interpreted all pertinent imaging results/findings within the past 24 hours.

## 2017-12-06 NOTE — ASSESSMENT & PLAN NOTE
- began after a fall at home   - patient ambulated immediately after fall and has not ambulated since  - PT/OT evaluation in progress

## 2017-12-06 NOTE — PLAN OF CARE
Problem: Occupational Therapy Goal  Goal: Occupational Therapy Goal  Goals to be met by: 12/15/2017  Patient will increase functional independence with ADLs by performing:    Feeding with Supervision.  UE Dressing with Contact Guard Assistance.  LE Dressing with Moderate Assistance.  Grooming while seated with Stand-by Assistance.  Toileting from bedside commode with Maximum Assistance for hygiene and clothing management.   Rolling to Bilateral with Stand-by Assistance.   Supine to sit with Stand-by Assistance.  Stand pivot transfers with Contact Guard Assistance.  Toilet transfer to bedside commode with Contact Guard Assistance.  Increased functional strength to WFL for BUE.  Upper extremity exercise program 10 reps per handout, with assistance as needed.     Outcome: Ongoing (interventions implemented as appropriate)  The patient was very sleepy; it was difficult to keep him awake during the therapy session.  He was Max A supine><sit EOB, Mod A bed><chair transfer, Total A toilet hygiene (bed level), and Mod A self-feeding (wife instructed and practiced assisting with lunch meal). AZEEM Roth 12/6/2017

## 2017-12-06 NOTE — CONSULTS
[facility]  Cardiology  Consult Note    Patient Name: Rikki Morrison  MRN: [id number]  Admission Date: 12/5/2017  Hospital Length of Stay: 0 days  Code Status: Full Code   Attending Provider: Dinah Phan MD   Consulting Provider: Sherita Beltran MD  Primary Care Physician: ODALYS Villafuerte MD  Principal Problem:Unable to ambulate    Patient information was obtained from past medical records.     Inpatient consult to Cardiology  Consult performed by: SHERITA BELTRAN  Consult ordered by: JEROME ZELAYA        Subjective:     Chief Complaint: Fall. Difficulty walking.     HPI:     Rikki Morrison is a 77 y.o. male with hypercholesterolemia and diabetes mellitus type 2. He used to be overweight but lost weight being demented. 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 In 2004 he underwent mitral valve replacement receiving a 33 mm St. Cyril valve. He has chronic atrial fibrillation. He was diagnosed with dementia in the spring of 2017. He denies any exertional chest pain or exertional dyspnea. No palpitations or weak spells. Usually happy and feeling well overall.     Had a fall in the backyard on 11/28/2017. Brusing right hip. He was seen in Dr. Conn's office and Xrays were negative for fracture. Came to [facility] on 12/4/2017 mostly due to constipation. Unable to stand. Admitted.    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 BMI  26.    Stricture and stenosis of esophagus 1/22/2014    Dilation 11/01, 12/13 Dr. Caldwell       Past Surgical History:   Procedure Laterality Date    CATARACT EXTRACTION, BILATERAL      HERNIA REPAIR      KNEE ARTHROSCOPY Right 1990    MITRAL VALVE REPLACEMENT  11/04    mechanical    Trigger finger right hand  1997       Review of patient's allergies indicates:   Allergen Reactions    Pcn [penicillins] Rash       No current facility-administered medications on file prior to encounter.      Current Outpatient Prescriptions on File Prior to Encounter   Medication Sig    amlodipine (NORVASC) 2.5 MG tablet TAKE ONE TABLET BY MOUTH ONCE DAILY    aspirin (ECOTRIN) 81 MG EC tablet Take 1 tablet (81 mg total) by mouth once daily. (Patient taking differently: Take 81 mg by mouth every evening. )    cholecalciferol, vitamin D3, (VITAMIN D3) 1,000 unit capsule Take 1,000 Units by mouth once daily.    cyanocobalamin, vitamin B-12, 1,000 mcg Subl Place 1 tablet under the tongue once daily.    ferrous sulfate 325 (65 FE) MG EC tablet Take 325 mg by mouth once daily. at dinner    finasteride (PROSCAR) 5 mg tablet TAKE ONE TABLET BY MOUTH ONCE DAILY    fish oil-omega-3 fatty acids 300-1,000 mg capsule Take 1 g by mouth once daily.    memantine (NAMENDA) 10 MG Tab Take 10 mg by mouth 2 (two) times daily.     metoprolol succinate (TOPROL-XL) 50 MG 24 hr tablet TAKE ONE TABLET BY MOUTH ONCE DAILY (Patient taking differently: TAKE ONE TABLET BY MOUTH ONCE DAILY IN THE MORNING)    nitroGLYCERIN (NITROSTAT) 0.4 MG SL tablet Place 1 tablet (0.4 mg total) under the tongue every 5 (five) minutes as needed for Chest pain.    omeprazole (PRILOSEC) 20 MG capsule TAKE ONE CAPSULE BY MOUTH TWICE DAILY (Patient taking differently: TAKE ONE CAPSULE BY MOUTH TWICE DAILY AS NEEDED)    potassium citrate (UROCIT-K) 10 mEq (1,080 mg) TbSR Take 1 tablet (10 mEq total) by mouth 3 (three) times daily with meals.    rosuvastatin (CRESTOR)  5 MG tablet Take 1 tablet (5 mg total) by mouth every evening.    trospium (SANCTURA XR) 60 mg Cp24 capsule Take 60 mg by mouth every morning.     UBIDECARENONE (COENZYME Q10) 200 mg Tab Take 1 capsule by mouth once daily.     warfarin (COUMADIN) 5 MG tablet TAKE ONE TABLET BY MOUTH ONCE DAILY AT BEDTIME    [DISCONTINUED] donepezil (ARICEPT ODT) 10 mg disintegrating tablet TAKE ONE TABLET BY MOUTH ONCE DAILY IN THE EVENING    [DISCONTINUED] PNV w/o calcium-iron fum-FA (M-VIT) 27-1 mg Tab Take by mouth.    [DISCONTINUED] vortioxetine (TRINTELLIX) 5 mg Tab Take 1 tablet by mouth once daily. (Patient taking differently: Take 1 tablet by mouth once daily. )     Family History     Problem Relation (Age of Onset)    Cancer Father    Diabetes Mother    Heart disease Mother (65)        Social History Main Topics    Smoking status: Never Smoker    Smokeless tobacco: Never Used    Alcohol use No    Drug use: No    Sexual activity: Not Currently     Review of Systems   Unable to perform ROS: dementia   Cardiovascular: Negative for chest pain.   Respiratory: Negative for shortness of breath.      Objective:     Vital Signs (Most Recent):  Temp: 97.4 °F (36.3 °C) (12/05/17 1937)  Pulse: 78 (12/05/17 1937)  Resp: 18 (12/05/17 1937)  BP: 128/68 (12/05/17 1937)  SpO2: 96 % (12/05/17 1937) Vital Signs (24h Range):  Temp:  [97.3 °F (36.3 °C)-98.3 °F (36.8 °C)] 97.4 °F (36.3 °C)  Pulse:  [71-93] 78  Resp:  [16-18] 18  SpO2:  [96 %-100 %] 96 %  BP: (115-153)/(56-75) 128/68     Weight: 81.6 kg (180 lb)  Body mass index is 24.41 kg/m².    SpO2: 96 %  O2 Device (Oxygen Therapy): room air      Intake/Output Summary (Last 24 hours) at 12/05/17 2106  Last data filed at 12/05/17 0156   Gross per 24 hour   Intake             1000 ml   Output                0 ml   Net             1000 ml       Lines/Drains/Airways     Peripheral Intravenous Line                 Peripheral IV - Single Lumen 12/05/17 0058 Right Antecubital less than 1  day                Physical Exam   Constitutional: He is oriented to person, place, and time. He appears well-developed and well-nourished. He does not appear ill. No distress.   HENT:   Head: Normocephalic and atraumatic.   Nose: Nose normal.   Eyes: Right eye exhibits no discharge. Left eye exhibits no discharge. Right conjunctiva is not injected. Left conjunctiva is not injected. Right pupil is round. Left pupil is round. Pupils are equal.   Neck: Neck supple. No JVD present. Carotid bruit is not present. No thyromegaly present.   Cardiovascular: Normal rate, regular rhythm, S1 normal and S2 normal.   No extrasystoles are present. PMI is not displaced.  Exam reveals no gallop.    Murmur heard.  High-pitched blowing holosystolic murmur is present with a grade of 2/6  at the apex Mechanical S1.  Pulses:       Radial pulses are 2+ on the right side, and 2+ on the left side.        Femoral pulses are 2+ on the right side, and 2+ on the left side.       Dorsalis pedis pulses are 2+ on the right side, and 2+ on the left side.        Posterior tibial pulses are 2+ on the right side, and 2+ on the left side.   Pulmonary/Chest: Effort normal and breath sounds normal.   Abdominal: Soft. Normal appearance. There is no hepatosplenomegaly. There is no tenderness.   Musculoskeletal:        Right ankle: He exhibits no swelling, no ecchymosis and no deformity.        Left ankle: He exhibits no swelling, no ecchymosis and no deformity.   Lymphadenopathy:        Head (right side): No submandibular adenopathy present.        Head (left side): No submandibular adenopathy present.     He has no cervical adenopathy.   Neurological: He is alert and oriented to person, place, and time. He is not disoriented. No cranial nerve deficit.   Skin: Skin is warm, dry and intact. Ecchymosis (right leg mostly around hip) noted. He is not diaphoretic. No cyanosis. Nails show no clubbing.   Psychiatric: Cognition and memory are impaired.      Current Medications:     amLODIPine  2.5 mg Oral Daily    aspirin  81 mg Oral Daily    finasteride  5 mg Oral Daily    memantine  5 mg Oral BID    metoprolol succinate  50 mg Oral Daily    polyethylene glycol  17 g Oral Daily    QUEtiapine  25 mg Oral Daily    rosuvastatin  5 mg Oral QHS    trospium  60 mg Oral Daily    vortioxetine  1 tablet Oral Daily     Current Laboratory Results:    Recent Results (from the past 24 hour(s))   Protime-INR    Collection Time: 12/05/17 12:56 AM   Result Value Ref Range    Prothrombin Time 52.2 (H) 9.0 - 12.5 sec    INR 5.0 (HH) 0.8 - 1.2   CBC auto differential    Collection Time: 12/05/17 12:56 AM   Result Value Ref Range    WBC 13.01 (H) 3.90 - 12.70 K/uL    RBC 3.87 (L) 4.60 - 6.20 M/uL    Hemoglobin 12.1 (L) 14.0 - 18.0 g/dL    Hematocrit 36.0 (L) 40.0 - 54.0 %    MCV 93 82 - 98 fL    MCH 31.3 (H) 27.0 - 31.0 pg    MCHC 33.6 32.0 - 36.0 g/dL    RDW 14.1 11.5 - 14.5 %    Platelets 355 (H) 150 - 350 K/uL    MPV 9.7 9.2 - 12.9 fL    Gran # 8.6 (H) 1.8 - 7.7 K/uL    Lymph # 2.6 1.0 - 4.8 K/uL    Mono # 1.3 (H) 0.3 - 1.0 K/uL    Eos # 0.3 0.0 - 0.5 K/uL    Baso # 0.03 0.00 - 0.20 K/uL    Gran% 66.3 38.0 - 73.0 %    Lymph% 20.3 18.0 - 48.0 %    Mono% 10.3 4.0 - 15.0 %    Eosinophil% 2.5 0.0 - 8.0 %    Basophil% 0.2 0.0 - 1.9 %    Differential Method Automated    Basic metabolic panel    Collection Time: 12/05/17 12:56 AM   Result Value Ref Range    Sodium 139 136 - 145 mmol/L    Potassium 4.1 3.5 - 5.1 mmol/L    Chloride 106 95 - 110 mmol/L    CO2 25 23 - 29 mmol/L    Glucose 162 (H) 70 - 110 mg/dL    BUN, Bld 28 (H) 8 - 23 mg/dL    Creatinine 1.1 0.5 - 1.4 mg/dL    Calcium 10.6 (H) 8.7 - 10.5 mg/dL    Anion Gap 8 8 - 16 mmol/L    eGFR if African American >60 >60 mL/min/1.73 m^2    eGFR if non African American >60 >60 mL/min/1.73 m^2   POCT glucose    Collection Time: 12/05/17 12:56 AM   Result Value Ref Range    POCT Glucose 183 (H) 70 - 110 mg/dL   Urinalysis     Collection Time: 12/05/17  3:31 AM   Result Value Ref Range    Specimen UA Urine, Clean Catch     Color, UA Yellow Yellow, Straw, Suzanna    Appearance, UA Clear Clear    pH, UA 6.0 5.0 - 8.0    Specific Gravity, UA 1.010 1.005 - 1.030    Protein, UA Negative Negative    Glucose, UA Negative Negative    Ketones, UA Negative Negative    Bilirubin (UA) Negative Negative    Occult Blood UA Trace (A) Negative    Nitrite, UA Negative Negative    Urobilinogen, UA Negative <2.0 EU/dL    Leukocytes, UA Negative Negative   Hemoglobin A1c    Collection Time: 12/05/17  5:08 AM   Result Value Ref Range    Hemoglobin A1C 5.8 (H) 4.0 - 5.6 %    Estimated Avg Glucose 120 68 - 131 mg/dL   Ferritin    Collection Time: 12/05/17  5:08 AM   Result Value Ref Range    Ferritin 584 (H) 20.0 - 300.0 ng/mL   Folate    Collection Time: 12/05/17  5:08 AM   Result Value Ref Range    Folate 12.0 4.0 - 24.0 ng/mL   Iron and TIBC    Collection Time: 12/05/17  5:08 AM   Result Value Ref Range    Iron 31 (L) 45 - 160 ug/dL    Transferrin 142 (L) 200 - 375 mg/dL    TIBC 210 (L) 250 - 450 ug/dL    Saturated Iron 15 (L) 20 - 50 %   Vitamin B12    Collection Time: 12/05/17  5:08 AM   Result Value Ref Range    Vitamin B-12 862 210 - 950 pg/mL   POCT glucose    Collection Time: 12/05/17  7:36 AM   Result Value Ref Range    POCT Glucose 159 (H) 70 - 110 mg/dL   POCT glucose    Collection Time: 12/05/17  3:52 PM   Result Value Ref Range    POCT Glucose 203 (H) 70 - 110 mg/dL     Current Imaging Results:    Imaging Results          X-Ray Chest 1 View (In process)                CT Lower Extremity Without Cont Right (Final result)  Result time 12/05/17 03:16:19    Final result by Luis Enrique Morrell MD (12/05/17 03:16:19)                 Impression:      Significantly motion limited examination. No obvious fracture.    Suprapatellar joint effusion.      Electronically signed by: Luis Enrique Morrell  Date:     12/05/17  Time:    03:16              Narrative:     CLINICAL INDICATION:  Hip pain.    TECHNIQUE: CT images of the left lower extremity from the left hip through the left knee were obtained. No IV or oral contrast was utilized. Images were obtained utilizing bone window algorithm, which significantly limits evaluation of the soft tissues at the expense of improved bone characterization. Multiplanar reconstructions were created.       COMPARISON: Right hip x-rays, same day.    FINDINGS:  Motion limits evaluation.    There is no displaced fracture or dislocation. No age advanced degenerative changes involving the right hip.    Motion severely limits evaluation of the right knee. No large fracture. There is a suprapatellar joint effusion.    Urinary bladder is mildly distended. Probable right hydrocele. There are vascular calcifications. There is a metallic radiopaque structure in the right thigh superficial soft tissues (axial series 5, image 20). There are vascular calcifications.                             X-Ray Abdomen AP 1 View (KUB) (Final result)  Result time 12/05/17 01:33:40    Final result by Luis Enrique Morrell MD (12/05/17 01:33:40)                 Impression:    Marked gaseous distention of the colon and a moderate volume of solid stool.      Electronically signed by: Luis Enrique Morrell  Date:     12/05/17  Time:    01:33              Narrative:    EXAM: ABDOMEN 1 VIEW    CLINICAL INDICATION:  Constipation.    COMPARISON:  None.    TECHNIQUE:  Single AP supine view of the abdomen was obtained.    FINDINGS:  There is marked gaseous distention of the colon and moderate volume of solid stool.  No large fecal burden in the rectum. Evaluation for pneumoperitoneum is limited on this supine radiograph. There are degenerative changes in the lumbar spine.                             X-Ray Hip 2 or 3 views Right (Final result)  Result time 12/05/17 01:30:50    Final result by Luis Enrique Morrell MD (12/05/17 01:30:50)                 Impression:        No acute bony  abnormality.      Electronically signed by: Luis Enrique Wittwinston  Date:     12/05/17  Time:    01:30              Narrative:    COMPARISON: None.    CLINICAL INFORMATION: Right hip pain.    FINDINGS: Two views of the right hip.  No displaced fracture. Normal alignment at the hip joint. No age advanced degenerative changes. No radiopaque foreign body.                              Assessment and Plan:     Problem List:    Active Diagnoses:    Diagnosis Date Noted POA    PRINCIPAL PROBLEM:  Unable to ambulate [R26.2] 12/05/2017 Yes    Atrial fibrillation [I48.91] 11/12/2012 Yes    Hypercholesterolemia [E78.00] 06/25/2013 Yes    Diabetes mellitus, type 2 [E11.9] 11/12/2012 Yes    Supratherapeutic INR [R79.1] 12/05/2017 Yes      Problems Resolved During this Admission:    Diagnosis Date Noted Date Resolved POA     Assessment and Plan:    1. Mitral Valve Diseas              2004: Mitral valve replacement. St. Cyril Mechanical Valve.              On warfarin and aspirin.     2. History of Mitral Valve Endocarditis              1963: Bacterial endocarditis of the mitral valve.                 3. Atrial Fibrillation              2004: Diagnosed with permanent atrial fibrillation.              On warfarin. Has mechanical mitral valve.              On metoprolol 50 mg Q24.              Rate appears well controlled.     4. Chronic Anticoagulation              2004: Began warfarin. Anticoagulation managed by Dr. Gamino.               Indication: Mechanical mitral valve and atrial fibrillation              Target INR 2.5-3.0              On warfarin and aspirin 81 mg Q24.   INR 5.0 on presentation.     5. Hypercholesterolemia              2004: Began statin.              10/25/2016: Chol 150. HDL 33. LDL 90. .              On rosuvastatin 5 mg Q24.                6. Diabetes Mellitus, Type 2              2008: Diagnosed. Complications: None. Medications: Diet.              Diet controlled.      7. History of Overweight               3/22/2017: Weight 84 kg. BMI 26.              6/21/2017: Weight 80 kg. BMI 25.              Now fine.     8. Dementia              2016: Diagnosed.              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              On donepezil 10 mg Q24.    9. Fall   Getting PT.     10. Primary Care              Dr. Jj Villafuerte.       VTE Risk Mitigation         Ordered     Place MYRTLE hose  Until discontinued      12/05/17 0609     Medium Risk of VTE  Once      12/05/17 0434     Reason for No Pharmacological VTE Prophylaxis  Once      12/05/17 0434     Reason for no Mechanical VTE Prophylaxis  Once      12/05/17 0434          Thank you for your consult. I will follow-up with patient. Please contact us if you have any additional questions.    Sherita Gamino MD  Cardiology   [facility]

## 2017-12-06 NOTE — ASSESSMENT & PLAN NOTE
- diagnosed 2004  - s/p mitral valve replacement  - anticoagulated with coumadin  - rate controlled with metoprolol 50 mg QD   -Monitor daily INR, currently supratherapeutic at 5.0

## 2017-12-06 NOTE — PROGRESS NOTES
[facility] Hospital Medicine  Progress Note    Patient Name: Rikki Morrison  MRN: [id number]  Patient Class: OP- Observation   Admission Date: 12/5/2017  Length of Stay: 0 days  Attending Physician: Dinah Phan MD  Primary Care Provider: ODALYS Villafuerte MD        Subjective:     Principal Problem:Gait disturbance    HPI:  Mr. Rikki Morrison is a 77 y.o. male, with history of dementia, who was brought in for concern of constipation which started one week ago s/p fall. Per spouse who did not witness fall, she believes patient tripped over a wire in the backyard while she was inside the house. She notes that patient was able to ambulate immediately after the fall but was unable to ambulate later that night. Spouse states that the patient has not had a BM since falling and says that constipation is unchanged with half dose of miralax (given last night). She had been giving him imodium prior to the onset of constipation for diarrhea. She reports associated ecchymosis to the bilateral knees and thighs, as well as weakness and decreased appetite/fluid intake. She states she has been unable to have his INR checked on 11/30/17 as scheduled as he has been unable to ambulate to the car. Per spouse, patient denies fever, chills, nausea, vomiting, head trauma, numbness, abdominal pain, diarrhea, cough, or congestion. Spouse reports that patient was evaluated s/p fall with X-rays performed revealing no hip fracture. She reports a PSHx of mechanical valve replacement and says that patient has been on coumadin since 2004. Of note, history is provided by spouse due to patient's dementia status.    Hospital Course:  The patient cannot ambulate is is awaiting evaluation by PT/OT for discharge planning.    Interval History: The patient is unable to bear weight, but no other problems reported    Review of Systems   Unable to perform ROS: Dementia     Objective:     Vital Signs (Most Recent):  Temp: 97.6 °F  (36.4 °C) (12/06/17 0405)  Pulse: 80 (12/06/17 0405)  Resp: 20 (12/06/17 0028)  BP: 133/73 (12/06/17 0405)  SpO2: 95 % (12/06/17 0405) Vital Signs (24h Range):  Temp:  [96.5 °F (35.8 °C)-98.3 °F (36.8 °C)] 97.6 °F (36.4 °C)  Pulse:  [71-93] 80  Resp:  [17-20] 20  SpO2:  [95 %-100 %] 95 %  BP: (115-133)/(61-73) 133/73     Weight: 81.6 kg (180 lb)  Body mass index is 24.41 kg/m².  No intake or output data in the 24 hours ending 12/06/17 0756   Physical Exam   Constitutional: Vital signs are normal. He appears well-developed and well-nourished.  Non-toxic appearance. He does not have a sickly appearance. He does not appear ill. No distress.   HENT:   Head: Normocephalic and atraumatic.   Right Ear: External ear normal.   Left Ear: External ear normal.   Eyes: Conjunctivae and EOM are normal. Pupils are equal, round, and reactive to light. No scleral icterus.   Neck: Normal range of motion. Neck supple. No JVD present. No tracheal deviation present.   Cardiovascular: Normal rate, regular rhythm and intact distal pulses.  Exam reveals no gallop and no friction rub.    No murmur heard.  2+ distal radial pulses. No carotid bruits.  No b/l LE edema. Mechanical valve audible upon exam.    Pulmonary/Chest: Effort normal and breath sounds normal. No stridor. No respiratory distress. He has no wheezes. He has no rales.   Abdominal: Soft. Bowel sounds are normal. He exhibits distension (mild). He exhibits no mass. There is no tenderness. There is no guarding.   Musculoskeletal: Normal range of motion. He exhibits no edema or deformity.   Neurological: He is alert.   Responds to name. Not oriented to place/time/location.  The patient was alert, relaxed and cooperative. CN II-XII were grossly intact. The patient had good muscle bulk and tone with equal strength throughout bilateral extremities. Sensation was intact to light touch.  Mild tremors noted.   Skin: Skin is warm and dry. Capillary refill takes less than 2 seconds. He is  not diaphoretic.   Healing wound on right elbow   Psychiatric: He has a normal mood and affect. His behavior is normal. Judgment and thought content normal.   Nursing note and vitals reviewed.      Significant Labs: All pertinent labs within the past 24 hours have been reviewed.    Significant Imaging: I have reviewed and interpreted all pertinent imaging results/findings within the past 24 hours.    Assessment/Plan:      * Gait disturbance    - began after a fall at home   - patient ambulated immediately after fall and has not ambulated since  - PT/OT evaluation in progress        Supratherapeutic INR    - takes coumadin for A. Fib  - followed by Dr. Gamino   - patient not able to have INR checked 2/2 unable to get out of bed/ambulate   - holding coumadin   -Continue to monitor daily INR            Type 2 diabetes mellitus without complication, without long-term current use of insulin    - last A1C: 5.8 on 9/8/17  - diabetic diet, PRN insulin only           Permanent atrial fibrillation    - diagnosed 2004  - s/p mitral valve replacement  - anticoagulated with coumadin  - rate controlled with metoprolol 50 mg QD   -Monitor daily INR, currently supratherapeutic at 5.0        Hypercholesterolemia    - continue rosuvastatin (CRESTOR) 5 MG tablet  - last lipid panel   Results for TO [facility]ERS (MRN [id number]) as of 12/5/2017 06:13   9/8/2017 11:05   Cholesterol 168   HDL 31 (L)   LDL Cholesterol 107 (H)   Non HDL Chol. (LDL+VLDL) 137 (H)   Triglycerides 180 (H)               VTE Risk Mitigation         Ordered     Place MYRTLE hose  Until discontinued      12/05/17 0609     Medium Risk of VTE  Once      12/05/17 0434     Reason for No Pharmacological VTE Prophylaxis  Once      12/05/17 0434     Reason for no Mechanical VTE Prophylaxis  Once      12/05/17 0434              Dinah Phan MD  Department of Hospital Medicine   [facility]

## 2017-12-06 NOTE — HOSPITAL COURSE
The patient cannot ambulate and he was evaluated by PT/OT who recommends SNF placement.  The patient is participating with therapy.  The patient had a supratherapeutic INR of 5.0 initially and Coumadin was held.  The patient restarted Coumadin 5mg and INR on the day of discharge is 1.9.  Goal of anticoagulation is 2.5-3.5.  The patient will transfer to SNF for continued therapy.

## 2017-12-06 NOTE — ASSESSMENT & PLAN NOTE
- takes coumadin for A. Fib  - followed by Dr. Gamino   - patient not able to have INR checked 2/2 unable to get out of bed/ambulate   - holding coumadin   -Continue to monitor daily INR

## 2017-12-06 NOTE — PT/OT/SLP PROGRESS
Occupational Therapy  Treatment    Rikki Morrison   MRN: [id number]   Admitting Diagnosis: Gait disturbance    OT Date of Treatment: 12/06/17   OT Start Time: 1206  OT Stop Time: 1331  OT Total Time (min): 85 min    Billable Minutes:  Self Care/Home Management 85    General Precautions: Standard, fall, hearing impaired  Orthopedic Precautions: N/A  Braces: N/A    Do you have any cultural, spiritual, Pentecostalism conflicts, given your current situation?: none    Subjective:  The patient was found in bed (room dark) with the patient and his wife just arousing from  Sleep.  He was difficult to arouse and needed constant manual q\during to stay on tasks with quick onset of assist to maximize attention and cooperation during the session.. Treatment centered around family training with assist for self-feeding as the patient's wife was concerned that he was eating very little and no feeding himself.    Pain/Comfort  Pain Rating 1:  (UT no pain reactions noted)  Pain Rating Post-Intervention 1:  (UT: no pain reactions noted)    Objective:  Patient found with: bed alarm, SCD, peripheral IV, telemetry     Functional Mobility:  Bed Mobility:   Max A supine>sit EOB  Max A sit>supine in bed    Transfers: (manual assist, attempted but pt not able to safety use the RW)    Mod A sit><stand  Mod A bed><chair squat pivot transfer    Functional Ambulation: none    Activities of Daily Living:   Mod A self-feeding with the patient's wife instructed and practiced assisting with the lunch meal).             Objects needed to removed from the tray to limit visual distraction>  Assist with scooting initiating hand>mouth reach, intervening to breakup non-purposeful utensil use and pacing activity to facilitate continuous eating.  Mod A Grooming-Hygiene (wash and dry hands x2, wipe mouth after meal) - assist for initiating, continuing and completeness of task  Total A toilet hygiene (urination clean-up bed level)    Balance:   Static Sit: POOR:  "Needs MODERATE assist to maintain  (needs to bed held up or falls backward) - all tasks needed to be done in supported sitting  Dynamic Sit: 0: N/A falls- does not attempt to adjust posture  Static Stand: POOR: Needs MODERATE assist to maintain  Dynamic stand: POOR: N/A with assisted transfers    Therapeutic Activities and Exercises:  Family training: for assisting with self-feeding: use of manual assist for cuing ([place]-response to VC inconsistent with the patient's wife did not occur with therapist intervention) and rapid timing, with consistency of approach emphasized    AM-PAC 6 CLICK ADL   How much help from another person does this patient currently need?   1 = Unable, Total/Dependent Assistance  2 = A lot, Maximum/Moderate Assistance  3 = A little, Minimum/Contact Guard/Supervision  4 = None, Modified [place]/Independent    Putting on and taking off regular lower body clothing? : 1  Bathing (including washing, rinsing, drying)?: 1  Toileting, which includes using toilet, bedpan, or urinal? : 1  Putting on and taking off regular upper body clothing?: 1  Taking care of personal grooming such as brushing teeth?: 2  Eating meals?: 2  Total Score: 8     AM-PAC Raw Score CMS "G-Code Modifier Level of Impairment Assistance   6 % Total / Unable   7 - 8 CM 80 - 100% Maximal Assist   9-13 CL 60 - 80% Moderate Assist   14 - 19 CK 40 - 60% Moderate Assist   20 - 22 CJ 20 - 40% Minimal Assist   23 CI 1-20% SBA / CGA   24 CH 0% Independent/ Mod I       Patient left supine with HOB raised with all lines intact, call button in reach, bed alarm on and patient's wife present    ASSESSMENT:  Rikki Morrison is a 77 y.o. male with a medical diagnosis of Gait disturbance and presents with  impaired endurance, decreased coordination, impaired cognition, decreased lower extremity function, impaired fine motor (fatigue) effected performance during the session.. The patient was very sleepy; it was difficult to keep him " awake during the therapy session.  He was Max A supine><sit EOB, Mod A bed><chair transfer, Total A toilet hygiene (bed level), and Mod A self-feeding (wife instructed and practiced assisting with lunch meal).  Continuation of OT treatment is needed to maximize patient performance and family training during his hospitalization.    Rehab identified problem list/impairments: Rehab identified problem list/impairments: impaired endurance, decreased coordination, impaired cognition, decreased lower extremity function, impaired fine motor (fatigue)    Rehab potential is good.    Activity tolerance: Good    Discharge recommendations: Discharge Facility/Level Of Care Needs: nursing facility, skilled     Barriers to discharge: Barriers to Discharge: Decreased caregiver support (pt's level of caregiver assist is greater than is currently available)    Equipment recommendations: none     GOALS:    Occupational Therapy Goals        Problem: Occupational Therapy Goal    Goal Priority Disciplines Outcome Interventions   Occupational Therapy Goal     OT, PT/OT Ongoing (interventions implemented as appropriate)    Description:  Goals to be met by: 12/15/2017  Patient will increase functional independence with ADLs by performing:    Feeding with Supervision.  UE Dressing with Contact Guard Assistance.  LE Dressing with Moderate Assistance.  Grooming while seated with Stand-by Assistance.  Toileting from bedside commode with Maximum Assistance for hygiene and clothing management.   Rolling to Bilateral with Stand-by Assistance.   Supine to sit with Stand-by Assistance.  Stand pivot transfers with Contact Guard Assistance.  Toilet transfer to bedside commode with Contact Guard Assistance.  Increased functional strength to WFL for BUE.  Upper extremity exercise program 10 reps per handout, with assistance as needed.                      Plan:  Patient to be seen 6 x/week to address the above listed problems via self-care/home  management, therapeutic activities, therapeutic exercises, cognitive retraining  Plan of Care expires: 01/05/18  Plan of Care reviewed with: patient, spouse         AZEEM Roth  12/06/2017

## 2017-12-07 PROBLEM — R79.1 SUPRATHERAPEUTIC INR: Status: RESOLVED | Noted: 2017-12-05 | Resolved: 2017-12-07

## 2017-12-07 LAB
ANION GAP SERPL CALC-SCNC: 7 MMOL/L
BASOPHILS # BLD AUTO: 0.02 K/UL
BASOPHILS NFR BLD: 0.2 %
BUN SERPL-MCNC: 19 MG/DL
CALCIUM SERPL-MCNC: 9.6 MG/DL
CHLORIDE SERPL-SCNC: 106 MMOL/L
CO2 SERPL-SCNC: 25 MMOL/L
CREAT SERPL-MCNC: 0.9 MG/DL
DIFFERENTIAL METHOD: ABNORMAL
EOSINOPHIL # BLD AUTO: 0.4 K/UL
EOSINOPHIL NFR BLD: 4.4 %
ERYTHROCYTE [DISTWIDTH] IN BLOOD BY AUTOMATED COUNT: 14.4 %
EST. GFR  (AFRICAN AMERICAN): >60 ML/MIN/1.73 M^2
EST. GFR  (NON AFRICAN AMERICAN): >60 ML/MIN/1.73 M^2
GLUCOSE SERPL-MCNC: 117 MG/DL
HCT VFR BLD AUTO: 35.2 %
HGB BLD-MCNC: 11.5 G/DL
INR PPP: 2.3
LYMPHOCYTES # BLD AUTO: 2.4 K/UL
LYMPHOCYTES NFR BLD: 27.7 %
MAGNESIUM SERPL-MCNC: 2.2 MG/DL
MCH RBC QN AUTO: 30.7 PG
MCHC RBC AUTO-ENTMCNC: 32.7 G/DL
MCV RBC AUTO: 94 FL
MONOCYTES # BLD AUTO: 0.9 K/UL
MONOCYTES NFR BLD: 10.4 %
NEUTROPHILS # BLD AUTO: 4.8 K/UL
NEUTROPHILS NFR BLD: 56.5 %
PHOSPHATE SERPL-MCNC: 2.9 MG/DL
PLATELET # BLD AUTO: 363 K/UL
PMV BLD AUTO: 9.3 FL
POCT GLUCOSE: 145 MG/DL (ref 70–110)
POCT GLUCOSE: 149 MG/DL (ref 70–110)
POCT GLUCOSE: 172 MG/DL (ref 70–110)
POCT GLUCOSE: 178 MG/DL (ref 70–110)
POCT GLUCOSE: 203 MG/DL (ref 70–110)
POTASSIUM SERPL-SCNC: 3.3 MMOL/L
PROTHROMBIN TIME: 24.8 SEC
RBC # BLD AUTO: 3.75 M/UL
SODIUM SERPL-SCNC: 138 MMOL/L
WBC # BLD AUTO: 8.56 K/UL

## 2017-12-07 PROCEDURE — 84100 ASSAY OF PHOSPHORUS: CPT

## 2017-12-07 PROCEDURE — 83735 ASSAY OF MAGNESIUM: CPT

## 2017-12-07 PROCEDURE — 80048 BASIC METABOLIC PNL TOTAL CA: CPT

## 2017-12-07 PROCEDURE — 25000003 PHARM REV CODE 250: Performed by: HOSPITALIST

## 2017-12-07 PROCEDURE — 25000003 PHARM REV CODE 250: Performed by: PHYSICIAN ASSISTANT

## 2017-12-07 PROCEDURE — 85025 COMPLETE CBC W/AUTO DIFF WBC: CPT

## 2017-12-07 PROCEDURE — 97110 THERAPEUTIC EXERCISES: CPT

## 2017-12-07 PROCEDURE — 97530 THERAPEUTIC ACTIVITIES: CPT

## 2017-12-07 PROCEDURE — 25000003 PHARM REV CODE 250

## 2017-12-07 PROCEDURE — 85610 PROTHROMBIN TIME: CPT

## 2017-12-07 PROCEDURE — G0378 HOSPITAL OBSERVATION PER HR: HCPCS

## 2017-12-07 PROCEDURE — 99225 PR SUBSEQUENT OBSERVATION CARE,LEVEL II: CPT | Mod: ,,, | Performed by: HOSPITALIST

## 2017-12-07 PROCEDURE — 97116 GAIT TRAINING THERAPY: CPT

## 2017-12-07 PROCEDURE — 97535 SELF CARE MNGMENT TRAINING: CPT | Mod: 59

## 2017-12-07 PROCEDURE — 36415 COLL VENOUS BLD VENIPUNCTURE: CPT

## 2017-12-07 RX ORDER — WARFARIN SODIUM 5 MG/1
5 TABLET ORAL DAILY
Status: DISCONTINUED | OUTPATIENT
Start: 2017-12-07 | End: 2017-12-08 | Stop reason: HOSPADM

## 2017-12-07 RX ADMIN — QUETIAPINE FUMARATE 25 MG: 25 TABLET, FILM COATED ORAL at 09:12

## 2017-12-07 RX ADMIN — WARFARIN SODIUM 5 MG: 5 TABLET ORAL at 04:12

## 2017-12-07 RX ADMIN — FINASTERIDE 5 MG: 5 TABLET, FILM COATED ORAL at 09:12

## 2017-12-07 RX ADMIN — POLYETHYLENE GLYCOL 3350 17 G: 17 POWDER, FOR SOLUTION ORAL at 09:12

## 2017-12-07 RX ADMIN — METOPROLOL SUCCINATE 50 MG: 50 TABLET, EXTENDED RELEASE ORAL at 09:12

## 2017-12-07 RX ADMIN — AMLODIPINE BESYLATE 2.5 MG: 2.5 TABLET ORAL at 09:12

## 2017-12-07 RX ADMIN — ASPIRIN 81 MG: 81 TABLET, COATED ORAL at 09:12

## 2017-12-07 RX ADMIN — TROSPIUM CHLORIDE ER 60 MG: 60 CAPSULE ORAL at 09:12

## 2017-12-07 RX ADMIN — ROSUVASTATIN CALCIUM 5 MG: 5 TABLET ORAL at 09:12

## 2017-12-07 RX ADMIN — MEMANTINE HYDROCHLORIDE 5 MG: 5 TABLET ORAL at 09:12

## 2017-12-07 NOTE — PLAN OF CARE
Problem: Physical Therapy Goal  Goal: Physical Therapy Goal  Goals to be met by: 12/15/17  /  Patient will increase functional independence with mobility by performin. Supine to sit with Contact Guard Assistance  2. Sit to supine with Contact Guard Assistance  3. Sit to stand transfer with Contact Guard Assistance  4. Bed to chair transfer with Contact Guard Assistance using Rolling Walker  5. Gait  x 50 feet with Contact Guard Assistance using Rolling Walker.      Outcome: Ongoing (interventions implemented as appropriate)  Pt found up in chair.Had been sitting ~ 2 hours.  Stood and amb 40' with min to mod A with assist to maneuver RW.  constant verbal cues.  Seated rest then amb 10' x 2 to and from bathroom.  Mod a to get off toilet and total A for hygiene.  Performed LE ex in sitting at edge of bed  REC:  SNF

## 2017-12-07 NOTE — PLAN OF CARE
Spoke to Smita from [place] [facility] who reports she will get back to us early in the am but patient looks good for admit tomorrow. Wife, Dr Phan, & nursing staff updated

## 2017-12-07 NOTE — PLAN OF CARE
Wife request Becki Raines at [facility] be added to referrals. Clinicals sent via [facility].

## 2017-12-07 NOTE — PLAN OF CARE
Problem: Occupational Therapy Goal  Goal: Occupational Therapy Goal  Goals to be met by: 12/15/2017  Patient will increase functional independence with ADLs by performing:    Feeding with Supervision.  UE Dressing with Contact Guard Assistance.  LE Dressing with Moderate Assistance.  Grooming while seated with Stand-by Assistance.  Toileting from bedside commode with Maximum Assistance for hygiene and clothing management.   Rolling to Bilateral with Stand-by Assistance.   Supine to sit with Stand-by Assistance.  Stand pivot transfers with Contact Guard Assistance.  Toilet transfer to bedside commode with Contact Guard Assistance.  Increased functional strength to WFL for BUE.  Upper extremity exercise program 10 reps per handout, with assistance as needed.     Outcome: Ongoing (interventions implemented as appropriate)  Pt is making steady progress towards his OT goals. Goals remain appropriate at this time.     Karma Bird, OTR/L  12/7/2017

## 2017-12-07 NOTE — SUBJECTIVE & OBJECTIVE
Interval History: The patient is constipated and had only a very small stool.  The patient will have a soap suds enema.    Review of Systems   Unable to perform ROS: Dementia     Objective:     Vital Signs (Most Recent):  Temp: 97.7 °F (36.5 °C) (12/07/17 1646)  Pulse: 82 (12/07/17 1646)  Resp: 18 (12/07/17 1110)  BP: 123/70 (12/07/17 1646)  SpO2: 95 % (12/07/17 1646) Vital Signs (24h Range):  Temp:  [97.4 °F (36.3 °C)-98.4 °F (36.9 °C)] 97.7 °F (36.5 °C)  Pulse:  [78-87] 82  Resp:  [16-18] 18  SpO2:  [91 %-99 %] 95 %  BP: ()/(56-70) 123/70     Weight: 81.6 kg (180 lb)  Body mass index is 24.41 kg/m².  No intake or output data in the 24 hours ending 12/07/17 1658   Physical Exam   Constitutional: Vital signs are normal. He appears well-developed and well-nourished.  Non-toxic appearance. He does not have a sickly appearance. He does not appear ill. No distress.   HENT:   Head: Normocephalic and atraumatic.   Right Ear: External ear normal.   Left Ear: External ear normal.   Eyes: Conjunctivae and EOM are normal. Pupils are equal, round, and reactive to light. No scleral icterus.   Neck: Normal range of motion. Neck supple. No JVD present. No tracheal deviation present.   Cardiovascular: Normal rate, regular rhythm and intact distal pulses.  Exam reveals no gallop and no friction rub.    No murmur heard.  2+ distal radial pulses. No carotid bruits.  No b/l LE edema. Mechanical valve audible upon exam.    Pulmonary/Chest: Effort normal and breath sounds normal. No stridor. No respiratory distress. He has no wheezes. He has no rales.   Abdominal: Soft. Bowel sounds are normal. He exhibits distension (mild). He exhibits no mass. There is no tenderness. There is no guarding.   Musculoskeletal: Normal range of motion. He exhibits no edema or deformity.   Neurological: He is alert.   Responds to name. Not oriented to place/time/location.  The patient was alert, relaxed and cooperative. CN II-XII were grossly intact.  The patient had good muscle bulk and tone with equal strength throughout bilateral extremities. Sensation was intact to light touch.  Mild tremors noted.   Skin: Skin is warm and dry. Capillary refill takes less than 2 seconds. He is not diaphoretic.   Healing wound on right elbow   Psychiatric: He has a normal mood and affect. His behavior is normal. Judgment and thought content normal.   Nursing note and vitals reviewed.    Significant Labs:   CBC:   Recent Labs  Lab 12/06/17 0518 12/07/17 0423   WBC 9.96 8.56   HGB 11.1* 11.5*   HCT 34.8* 35.2*    363*     CMP:   Recent Labs  Lab 12/06/17 0518 12/07/17 0423    138   K 3.7 3.3*    106   CO2 24 25   * 117*   BUN 24* 19   CREATININE 0.9 0.9   CALCIUM 9.6 9.6   ANIONGAP 6* 7*   EGFRNONAA >60 >60     Coagulation:   Recent Labs  Lab 12/07/17 0423   INR 2.3*       Significant Imaging: I have reviewed and interpreted all pertinent imaging results/findings within the past 24 hours.

## 2017-12-07 NOTE — PLAN OF CARE
Voicemails left for admissions at [facility] to followup on referrals sent yesterday

## 2017-12-07 NOTE — PT/OT/SLP PROGRESS
Physical Therapy Treatment    Patient Name:  Rikki Morrison   MRN:  [id number]    Recommendations:     Discharge Recommendations:  nursing facility, skilled   Discharge Equipment Recommendations: none   Barriers to discharge: pt requires more assist than wife can provdie at this time    Assessment:     Rikki Morrison is a 77 y.o. male admitted with a medical diagnosis of Gait disturbance.  He presents with the following impairments/functional limitations:  weakness, impaired endurance, decreased coordination, impaired cognition, decreased safety awareness, decreased lower extremity function, abnormal tone progressing toward goals  .    Rehab Prognosis:  good; patient would benefit from acute skilled PT services to address these deficits and reach maximum level of function.      Recent Surgery: * No surgery found *      Plan:     During this hospitalization, patient to be seen 6 x/week to address the above listed problems via therapeutic activities, therapeutic exercises, gait training, neuromuscular re-education  · Plan of Care Expires:  01/04/18   Plan of Care Reviewed with: patient, spouse    Subjective     Communicated with nursing prior to session.  Patient found in supine with HOB elevated and wife present trying to feed pt upon PT entry to room, agreeable to treatment.      Chief Complaint: none stated  Patient comments/goals: wife stated pt told her he wanted to get OOB  Pain/Comfort:  · Pain Rating 1: 0/10  · Pain Rating Post-Intervention 1: 0/10    Patients cultural, spiritual, Yarsani conflicts given the current situation: none    Objective:     Patient found with: bed alarm, SCD, peripheral IV, telemetry     General Precautions: Standard, fall, hearing impaired   Orthopedic Precautions:N/A   Braces: N/A     Functional Mobility:  · Bed Mobility:     · Rolling Left:  moderate assistance with side rail   · Supine to Sit: moderate assistance with side rail and cueing  · Sit to Supine: moderate  assistance  · Transfers:     · Sit to Stand:  moderate assistance with rolling walker  · Gait: amb 25' with RW and min to mod a.  required assist to maneuver RW as well as verbal, visual and tactile cues .  Shuffling gait, retropulsion .  Tries to sit down in chair before all the way turned around and backed up to chair. After sitting up x 1 hr stood with walker and just amb 3' back to bed from bs chair   · Balance: poor      AM-PAC 6 CLICK MOBILITY  Turning over in bed (including adjusting bedclothes, sheets and blankets)?: 2  Sitting down on and standing up from a chair with arms (e.g., wheelchair, bedside commode, etc.): 2  Moving from lying on back to sitting on the side of the bed?: 2  Moving to and from a bed to a chair (including a wheelchair)?: 2  Need to walk in hospital room?: 2  Climbing 3-5 steps with a railing?: 1  Total Score: 11       Therapeutic Activities and Exercises:   pt in chair after amb .  Set up with tray.  If food is placed on spoon and offered to pt he will take spoon and feed himself.  Can also hold glass and drink.  transfer back from chair to bed and repositioned in bed upon return.  Able to partially bridge to get blue pads under him.      Patient left HOB elevated with all lines intact, call button in reach, bed alarm on, nurse notified and wife present..    GOALS:    Physical Therapy Goals        Problem: Physical Therapy Goal    Goal Priority Disciplines Outcome Goal Variances Interventions   Physical Therapy Goal     PT/OT, PT Ongoing (interventions implemented as appropriate)     Description:  Goals to be met by: 12/15/17  /  Patient will increase functional independence with mobility by performin. Supine to sit with Contact Guard Assistance  2. Sit to supine with Contact Guard Assistance  3. Sit to stand transfer with Contact Guard Assistance  4. Bed to chair transfer with Contact Guard Assistance using Rolling Walker  5. Gait  x 50 feet with Contact Guard Assistance  using Rolling Walker.                       Time Tracking:     PT Received On: 12/06/17  PT Start Time: 1805     PT Stop Time: 1836  PT Total Time (min): 31 min     Billable Minutes: Gait Training 19 and Therapeutic Activity 36    Treatment Type: Treatment  PT/PTA: PT           Kinjal Mejia, PT  12/06/2017

## 2017-12-07 NOTE — PROGRESS NOTES
[facility] Hospital Medicine  Progress Note    Patient Name: Rikki Morrison  MRN: [id number]  Patient Class: OP- Observation   Admission Date: 12/5/2017  Length of Stay: 0 days  Attending Physician: Dinah Phan MD  Primary Care Provider: ODALYS Villafuerte MD        Subjective:     Principal Problem:Gait disturbance    HPI:  Mr. Rikki Morrison is a 77 y.o. male, with history of dementia, who was brought in for concern of constipation which started one week ago s/p fall. Per spouse who did not witness fall, she believes patient tripped over a wire in the backyard while she was inside the house. She notes that patient was able to ambulate immediately after the fall but was unable to ambulate later that night. Spouse states that the patient has not had a BM since falling and says that constipation is unchanged with half dose of miralax (given last night). She had been giving him imodium prior to the onset of constipation for diarrhea. She reports associated ecchymosis to the bilateral knees and thighs, as well as weakness and decreased appetite/fluid intake. She states she has been unable to have his INR checked on 11/30/17 as scheduled as he has been unable to ambulate to the car. Per spouse, patient denies fever, chills, nausea, vomiting, head trauma, numbness, abdominal pain, diarrhea, cough, or congestion. Spouse reports that patient was evaluated s/p fall with X-rays performed revealing no hip fracture. She reports a PSHx of mechanical valve replacement and says that patient has been on coumadin since 2004. Of note, history is provided by spouse due to patient's dementia status.    Hospital Course:  The patient cannot ambulate and he was evaluated by PT/OT who recommends SNF placement.  The patient is participating with therapy.  Discharge planning in progress for SNF.      Interval History: The patient is constipated and had only a very small stool.  The patient will have a soap suds  enema.    Review of Systems   Unable to perform ROS: Dementia     Objective:     Vital Signs (Most Recent):  Temp: 97.7 °F (36.5 °C) (12/07/17 1646)  Pulse: 82 (12/07/17 1646)  Resp: 18 (12/07/17 1110)  BP: 123/70 (12/07/17 1646)  SpO2: 95 % (12/07/17 1646) Vital Signs (24h Range):  Temp:  [97.4 °F (36.3 °C)-98.4 °F (36.9 °C)] 97.7 °F (36.5 °C)  Pulse:  [78-87] 82  Resp:  [16-18] 18  SpO2:  [91 %-99 %] 95 %  BP: ()/(56-70) 123/70     Weight: 81.6 kg (180 lb)  Body mass index is 24.41 kg/m².  No intake or output data in the 24 hours ending 12/07/17 1658   Physical Exam   Constitutional: Vital signs are normal. He appears well-developed and well-nourished.  Non-toxic appearance. He does not have a sickly appearance. He does not appear ill. No distress.   HENT:   Head: Normocephalic and atraumatic.   Right Ear: External ear normal.   Left Ear: External ear normal.   Eyes: Conjunctivae and EOM are normal. Pupils are equal, round, and reactive to light. No scleral icterus.   Neck: Normal range of motion. Neck supple. No JVD present. No tracheal deviation present.   Cardiovascular: Normal rate, regular rhythm and intact distal pulses.  Exam reveals no gallop and no friction rub.    No murmur heard.  2+ distal radial pulses. No carotid bruits.  No b/l LE edema. Mechanical valve audible upon exam.    Pulmonary/Chest: Effort normal and breath sounds normal. No stridor. No respiratory distress. He has no wheezes. He has no rales.   Abdominal: Soft. Bowel sounds are normal. He exhibits distension (mild). He exhibits no mass. There is no tenderness. There is no guarding.   Musculoskeletal: Normal range of motion. He exhibits no edema or deformity.   Neurological: He is alert.   Responds to name. Not oriented to place/time/location.  The patient was alert, relaxed and cooperative. CN II-XII were grossly intact. The patient had good muscle bulk and tone with equal strength throughout bilateral extremities. Sensation was  intact to light touch.  Mild tremors noted.   Skin: Skin is warm and dry. Capillary refill takes less than 2 seconds. He is not diaphoretic.   Healing wound on right elbow   Psychiatric: He has a normal mood and affect. His behavior is normal. Judgment and thought content normal.   Nursing note and vitals reviewed.    Significant Labs:   CBC:   Recent Labs  Lab 12/06/17 0518 12/07/17 0423   WBC 9.96 8.56   HGB 11.1* 11.5*   HCT 34.8* 35.2*    363*     CMP:   Recent Labs  Lab 12/06/17 0518 12/07/17 0423    138   K 3.7 3.3*    106   CO2 24 25   * 117*   BUN 24* 19   CREATININE 0.9 0.9   CALCIUM 9.6 9.6   ANIONGAP 6* 7*   EGFRNONAA >60 >60     Coagulation:   Recent Labs  Lab 12/07/17 0423   INR 2.3*       Significant Imaging: I have reviewed and interpreted all pertinent imaging results/findings within the past 24 hours.    Assessment/Plan:      * Gait disturbance    - began after a fall at home   - patient ambulated immediately after fall and has not ambulated since  - PT/OT recommends SNF, discharge planning in progress        Dementia due to Parkinson's disease without behavioral disturbance    Stable, no acute issues.          Type 2 diabetes mellitus without complication, without long-term current use of insulin    - last A1C: 5.8 on 9/8/17  - diabetic diet, PRN insulin only           Permanent atrial fibrillation    - diagnosed 2004  - s/p mitral valve replacement  - anticoagulated with coumadin, INR 2.3 today, will resume  Coumadin at 5mg daily and continue to monitor  - rate controlled with metoprolol 50 mg QD           Hypercholesterolemia    - continue rosuvastatin (CRESTOR) 5 MG tablet  - last lipid panel   Results for TO CHAMBERS (MRN [id number]) as of 12/5/2017 06:13   9/8/2017 11:05   Cholesterol 168   HDL 31 (L)   LDL Cholesterol 107 (H)   Non HDL Chol. (LDL+VLDL) 137 (H)   Triglycerides 180 (H)               VTE Risk Mitigation         Ordered     warfarin (COUMADIN)  tablet 5 mg  Daily     Route:  Oral        12/07/17 1501     Place MYRTLE hose  Until discontinued      12/05/17 0609     Medium Risk of VTE  Once      12/05/17 0434     Reason for No Pharmacological VTE Prophylaxis  Once      12/05/17 0434     Reason for no Mechanical VTE Prophylaxis  Once      12/05/17 0434              Dinah Phan MD  Department of Hospital Medicine   [facility]

## 2017-12-07 NOTE — PLAN OF CARE
Aundrea from [facility] reports patient has SNF auth. Attempted to call Becki Raines to follow up on referral-voicemail left

## 2017-12-07 NOTE — PLAN OF CARE
Problem: Physical Therapy Goal  Goal: Physical Therapy Goal  Goals to be met by: 12/15/17  //  Patient will increase functional independence with mobility by performin. Supine to sit with Contact Guard Assistance  2. Sit to supine with Contact Guard Assistance  3. Sit to stand transfer with Contact Guard Assistance  4. Bed to chair transfer with Contact Guard Assistance using Rolling Walker  5. Gait  x 50 feet with Contact Guard Assistance using Rolling Walker.      Outcome: Ongoing (interventions implemented as appropriate)  Assist to edge of bed and stood with RW.  Able to amb with assist then up to chair and set up for dinner.  Pt up in chair ~ 1 hour.  PT returned and assisted pt back to bed.  Closed eyes as soon as back to bed.  Need to increase time OOB.  Will coordinate with OT.  Progressing toward goals.  REC:  SNF

## 2017-12-07 NOTE — ASSESSMENT & PLAN NOTE
- diagnosed 2004  - s/p mitral valve replacement  - anticoagulated with coumadin, INR 2.3 today, will resume  Coumadin at 5mg daily and continue to monitor  - rate controlled with metoprolol 50 mg QD

## 2017-12-07 NOTE — ASSESSMENT & PLAN NOTE
- began after a fall at home   - patient ambulated immediately after fall and has not ambulated since  - PT/OT recommends SNF, discharge planning in progress

## 2017-12-07 NOTE — PT/OT/SLP PROGRESS
Physical Therapy Treatment    Patient Name:  Rikki Morrison   MRN:  [id number]    Recommendations:     Discharge Recommendations:  nursing facility, skilled   Discharge Equipment Recommendations: none   Barriers to discharge: pt requires more assistance than wife can provide    Assessment:     Rikki Morrison is a 77 y.o. male admitted with a medical diagnosis of Gait disturbance.  He presents with the following impairments/functional limitations:  weakness, impaired endurance, gait instability, impaired functional mobilty, impaired balance, decreased lower extremity function, abnormal tone, decreased safety awareness, impaired cognition progressing toward goals .    Rehab Prognosis:  good; patient would benefit from acute skilled PT services to address these deficits and reach maximum level of function.      Recent Surgery: * No surgery found *      Plan:     During this hospitalization, patient to be seen 6 x/week to address the above listed problems via gait training, therapeutic activities, therapeutic exercises, neuromuscular re-education  · Plan of Care Expires:  01/04/18   Plan of Care Reviewed with: patient, spouse    Subjective     Communicated with nursing prior to session.  Patient found up in chair with wife present upon PT entry to room, agreeable to treatment.      Chief Complaint: none stated, wife stated pt has not had BM since last week  Patient comments/goals: none stated, per wife she would like to get pt bathed  Pain/Comfort:  · Pain Rating 1: 0/10 (at rest, minimal grimacing when moving R leg despite pt denying pain.)  · Location - Side 1: Right  · Location 1: leg  · Pain Addressed 1: Pre-medicate for activity, Reposition, Distraction, Nurse notified  · Pain Rating Post-Intervention 1: 0/10    Patients cultural, spiritual, Orthodox conflicts given the current situation: none    Objective:     Patient found with: peripheral IV     General Precautions: Standard, fall, hearing impaired    Orthopedic Precautions:N/A   Braces: N/A     Functional Mobility:  · Bed Mobility:     · Sit to Supine: moderate assistance  · Transfers:     · Sit to Stand:  moderate assistance with rolling walker  · Gait: Stood and amb 40' with min to mod A with assist to maneuver RW.  constant verbal cues.  Seated rest then amb 10' x 2 to and from bathroom.  · Balance: poor      AM-PAC 6 CLICK MOBILITY  Turning over in bed (including adjusting bedclothes, sheets and blankets)?: 2  Sitting down on and standing up from a chair with arms (e.g., wheelchair, bedside commode, etc.): 2  Moving from lying on back to sitting on the side of the bed?: 2  Moving to and from a bed to a chair (including a wheelchair)?: 2  Need to walk in hospital room?: 2  Climbing 3-5 steps with a railing?: 1  Total Score: 11       Therapeutic Activities and Exercises:asssited to toilet-assist on and off and total a for hygiene   Performed LE ex in sitting at edge of bed-required verbal and visual cues     Patient left HOB elevated with all lines intact, call button in reach, bed alarm on, nurse notified and wife present..    GOALS:    Physical Therapy Goals        Problem: Physical Therapy Goal    Goal Priority Disciplines Outcome Goal Variances Interventions   Physical Therapy Goal     PT/OT, PT Ongoing (interventions implemented as appropriate)     Description:  Goals to be met by: 12/15/17  /  Patient will increase functional independence with mobility by performin. Supine to sit with Contact Guard Assistance  2. Sit to supine with Contact Guard Assistance  3. Sit to stand transfer with Contact Guard Assistance  4. Bed to chair transfer with Contact Guard Assistance using Rolling Walker  5. Gait  x 50 feet with Contact Guard Assistance using Rolling Walker.                       Time Tracking:     PT Received On: 17  PT Start Time: 1400     PT Stop Time: 1438  PT Total Time (min): 38 min     Billable Minutes: Gait Training 12, Therapeutic  Activity 18 and Therapeutic Exercise 8    Treatment Type: Treatment  PT/PTA: PT           Kinjal Mejia, PT  12/07/2017

## 2017-12-07 NOTE — PROGRESS NOTES
[facility]  Cardiology  Progress Note    Patient Name: Rikki Morrison  MRN: [id number]  Admission Date: 12/5/2017  Hospital Length of Stay: 0 days  Code Status: Full Code   Attending Physician: Dinah Phan MD   Primary Care Physician: ODALYS Villafuerte MD  Expected Discharge Date:   Principal Problem:Gait disturbance    Subjective:     Brief HPI:     Rikki Morrison is a 77 y.o. male with hypercholesterolemia and diabetes mellitus type 2. He used to be overweight but lost weight being demented. 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 In 2004 he underwent mitral valve replacement receiving a 33 mm [employer]. Cyril valve. He has chronic atrial fibrillation. He was diagnosed with dementia in the spring of 2017. He denies any exertional chest pain or exertional dyspnea. No palpitations or weak spells. Usually happy and feeling well overall.      Had a fall in the backyard on 11/28/2017. Brusing right hip. He was seen in Dr. Conn's office and Xrays were negative for fracture. [place] [facility] mostly due to constipation. Unable to stand. Admitted.    Hospital Course:     PT.    Interval History:    Been up on his feet and sitting in chair today.    Review of Systems   Cardiovascular: Negative for chest pain.   Respiratory: Negative for shortness of breath.      Objective:     Vital Signs (Most Recent):  Temp: 97.5 °F (36.4 °C) (12/06/17 1511)  Pulse: 79 (12/06/17 1511)  Resp: 17 (12/06/17 1511)  BP: 118/61 (12/06/17 1511)  SpO2: 95 % (12/06/17 1511) Vital Signs (24h Range):  Temp:  [96.5 °F (35.8 °C)-98.2 °F (36.8 °C)] 97.5 °F (36.4 °C)  Pulse:  [77-83] 79  Resp:  [17-20] 17  SpO2:  [95 %-98 %] 95 %  BP: (115-133)/(61-73) 118/61     Weight: 81.6 kg (180 lb)  Body mass index is 24.41 kg/m².    SpO2: 95 %  O2 Device (Oxygen Therapy): room air    No intake or output data in the 24 hours ending 12/06/17 1944    Lines/Drains/Airways     Peripheral Intravenous Line                  Peripheral IV - Single Lumen 12/05/17 0058 Right Antecubital 1 day                Physical Exam   Cardiovascular: Normal rate and S2 normal.  An irregularly irregular rhythm present.   Murmur heard.  Mechanical S1.   Pulmonary/Chest: Effort normal and breath sounds normal.   Skin: Skin is warm and dry. Ecchymosis noted.   Ecchymosis right leg.       Current Medications:     amLODIPine  2.5 mg Oral Daily    aspirin  81 mg Oral Daily    finasteride  5 mg Oral Daily    memantine  5 mg Oral BID    metoprolol succinate  50 mg Oral Daily    polyethylene glycol  17 g Oral Daily    QUEtiapine  25 mg Oral Daily    rosuvastatin  5 mg Oral QHS    trospium  60 mg Oral Daily    vortioxetine  1 tablet Oral Daily     Current Laboratory Results:    Recent Results (from the past 24 hour(s))   POCT glucose    Collection Time: 12/05/17  9:15 PM   Result Value Ref Range    POCT Glucose 168 (H) 70 - 110 mg/dL   Magnesium    Collection Time: 12/06/17  5:18 AM   Result Value Ref Range    Magnesium 2.2 1.6 - 2.6 mg/dL   Phosphorus    Collection Time: 12/06/17  5:18 AM   Result Value Ref Range    Phosphorus 3.1 2.7 - 4.5 mg/dL   Basic Metabolic Panel (BMP)    Collection Time: 12/06/17  5:18 AM   Result Value Ref Range    Sodium 139 136 - 145 mmol/L    Potassium 3.7 3.5 - 5.1 mmol/L    Chloride 109 95 - 110 mmol/L    CO2 24 23 - 29 mmol/L    Glucose 118 (H) 70 - 110 mg/dL    BUN, Bld 24 (H) 8 - 23 mg/dL    Creatinine 0.9 0.5 - 1.4 mg/dL    Calcium 9.6 8.7 - 10.5 mg/dL    Anion Gap 6 (L) 8 - 16 mmol/L    eGFR if African American >60 >60 mL/min/1.73 m^2    eGFR if non African American >60 >60 mL/min/1.73 m^2   CBC with Automated Differential    Collection Time: 12/06/17  5:18 AM   Result Value Ref Range    WBC 9.96 3.90 - 12.70 K/uL    RBC 3.70 (L) 4.60 - 6.20 M/uL    Hemoglobin 11.1 (L) 14.0 - 18.0 g/dL    Hematocrit 34.8 (L) 40.0 - 54.0 %    MCV 94 82 - 98 fL    MCH 30.0 27.0 - 31.0 pg    MCHC 31.9 (L) 32.0 - 36.0 g/dL    RDW  14.4 11.5 - 14.5 %    Platelets 326 150 - 350 K/uL    MPV 9.1 (L) 9.2 - 12.9 fL    Gran # 6.1 1.8 - 7.7 K/uL    Lymph # 2.3 1.0 - 4.8 K/uL    Mono # 1.0 0.3 - 1.0 K/uL    Eos # 0.5 0.0 - 0.5 K/uL    Baso # 0.02 0.00 - 0.20 K/uL    Gran% 61.3 38.0 - 73.0 %    Lymph% 23.5 18.0 - 48.0 %    Mono% 9.8 4.0 - 15.0 %    Eosinophil% 4.6 0.0 - 8.0 %    Basophil% 0.2 0.0 - 1.9 %    Differential Method Automated    PT/INR    Collection Time: 12/06/17  5:18 AM   Result Value Ref Range    Prothrombin Time 34.9 (H) 9.0 - 12.5 sec    INR 3.3 (H) 0.8 - 1.2   POCT glucose    Collection Time: 12/06/17 12:02 PM   Result Value Ref Range    POCT Glucose 148 (H) 70 - 110 mg/dL     Current Imaging Results:    Imaging Results          X-Ray Chest 1 View (Final result)     Abnormal  Result time 12/06/17 08:26:15    Final result by Fito Lockhart MD (12/06/17 08:26:15)                 Impression:        No acute cardiothoracic disease.Mild central vascular congestion.    Same day abdominal radiograph showed marked distention of the colon, which may account for the lucency under the right hemidiaphragm. However, pneumoperitoneum cannot be completely excluded. Please correlate clinically and obtain followup as indicated.    Electronically signed by: FITO LOCKHART MD  Date:     12/06/17  Time:    08:26              Narrative:    PORTABLE AP CHEST:      Comparison: Abdominal radiograph, same date    Findings:     No consolidations. Mild central vascular congestion. There is no pleural effusion or pneumothorax. The cardiac silhouette is normal in size.  There are no acute bony abnormalities. And a radiograph showed marked distention of the colon, which may account for the lucency under the right hemidiaphragm.                             CT Lower Extremity Without Cont Right (Final result)  Result time 12/05/17 03:16:19    Final result by Luis Enrique Morrell MD (12/05/17 03:16:19)                 Impression:      Significantly motion limited  examination. No obvious fracture.    Suprapatellar joint effusion.      Electronically signed by: Luis Enrique Morrell  Date:     12/05/17  Time:    03:16              Narrative:    CLINICAL INDICATION:  Hip pain.    TECHNIQUE: CT images of the left lower extremity from the left hip through the left knee were obtained. No IV or oral contrast was utilized. Images were obtained utilizing bone window algorithm, which significantly limits evaluation of the soft tissues at the expense of improved bone characterization. Multiplanar reconstructions were created.       COMPARISON: Right hip x-rays, same day.    FINDINGS:  Motion limits evaluation.    There is no displaced fracture or dislocation. No age advanced degenerative changes involving the right hip.    Motion severely limits evaluation of the right knee. No large fracture. There is a suprapatellar joint effusion.    Urinary bladder is mildly distended. Probable right hydrocele. There are vascular calcifications. There is a metallic radiopaque structure in the right thigh superficial soft tissues (axial series 5, image 20). There are vascular calcifications.                             X-Ray Abdomen AP 1 View (KUB) (Final result)  Result time 12/05/17 01:33:40    Final result by Luis Enrique Morrell MD (12/05/17 01:33:40)                 Impression:    Marked gaseous distention of the colon and a moderate volume of solid stool.      Electronically signed by: Luis Enrique Morrell  Date:     12/05/17  Time:    01:33              Narrative:    EXAM: ABDOMEN 1 VIEW    CLINICAL INDICATION:  Constipation.    COMPARISON:  None.    TECHNIQUE:  Single AP supine view of the abdomen was obtained.    FINDINGS:  There is marked gaseous distention of the colon and moderate volume of solid stool.  No large fecal burden in the rectum. Evaluation for pneumoperitoneum is limited on this supine radiograph. There are degenerative changes in the lumbar spine.                             X-Ray Hip 2 or  3 views Right (Final result)  Result time 12/05/17 01:30:50    Final result by Luis Enrique Morrell MD (12/05/17 01:30:50)                 Impression:        No acute bony abnormality.      Electronically signed by: Luis Enrique Morrell  Date:     12/05/17  Time:    01:30              Narrative:    COMPARISON: None.    CLINICAL INFORMATION: Right hip pain.    FINDINGS: Two views of the right hip.  No displaced fracture. Normal alignment at the hip joint. No age advanced degenerative changes. No radiopaque foreign body.                                Assessment and Plan:     Problem List:    Active Diagnoses:    Diagnosis Date Noted POA    PRINCIPAL PROBLEM:  Gait disturbance [R26.9] 12/05/2017 Yes    Permanent atrial fibrillation [I48.2] 11/12/2012 Yes    Hypercholesterolemia [E78.00] 06/25/2013 Yes    Type 2 diabetes mellitus without complication, without long-term current use of insulin [E11.9] 11/12/2012 Yes    Supratherapeutic INR [R79.1] 12/05/2017 Yes      Problems Resolved During this Admission:    Diagnosis Date Noted Date Resolved POA     Assessment and Plan:    1. Mitral Valve Diseas              2004: Mitral valve replacement. St. Cyril Mechanical Valve.              On warfarin and aspirin.     2. History of Mitral Valve Endocarditis              1963: Bacterial endocarditis of the mitral valve.     3. Atrial Fibrillation              2004: Diagnosed with permanent atrial fibrillation.              On warfarin. Has mechanical mitral valve.              On metoprolol 50 mg Q24.              Rate appears well controlled.     4. Chronic Anticoagulation              2004: Began warfarin. Anticoagulation managed by Dr. Gamino.               Indication: Mechanical mitral valve and atrial fibrillation              Target INR 2.5-3.0              On warfarin and aspirin 81 mg Q24.   10/16/2017 at 4:38 PM CDT   10/16/2017: 7.5 mg 3/7 and 5mg 4/7. Plan was check INR in 1 week but never done.              INR 5.0 on  presentation.   12/6/2017: INR 3.3.   Will resume warfarin at 5 mg Q24.     5. Hypercholesterolemia              2004: Began statin.              10/25/2016: Chol 150. HDL 33. LDL 90. .              On rosuvastatin 5 mg Q24.     6. Diabetes Mellitus, Type 2              2008: Diagnosed. Complications: None. Medications: Diet.              Diet controlled.      7. History of Overweight              3/22/2017: Weight 84 kg. BMI 26.              6/21/2017: Weight 80 kg. BMI 25.              Now fine.     8. Dementia              2016: Diagnosed.              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              On donepezil 10 mg Q24.     9. Fall              Getting PT.     10. Primary Care              Dr. Jj Villafuerte.      VTE Risk Mitigation         Ordered     Place MYRTLE hose  Until discontinued      12/05/17 0609     Medium Risk of VTE  Once      12/05/17 0434     Reason for No Pharmacological VTE Prophylaxis  Once      12/05/17 0434     Reason for no Mechanical VTE Prophylaxis  Once      12/05/17 0434          Sherita Gamino MD  Cardiology  [facility]

## 2017-12-07 NOTE — ASSESSMENT & PLAN NOTE
- continue rosuvastatin (CRESTOR) 5 MG tablet  - last lipid panel   Results for TO [facility] (MRN [id number]) as of 12/5/2017 06:13   9/8/2017 11:05   Cholesterol 168   HDL 31 (L)   LDL Cholesterol 107 (H)   Non HDL Chol. (LDL+VLDL) 137 (H)   Triglycerides 180 (H)

## 2017-12-07 NOTE — PLAN OF CARE
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 Mae      Type 2 diabetes mellitus without complication, without long-term current use of insulin [E11.9]  Yes     Priority: 3      2008: Diagnosed. Complications: None. Medications: Diet.      Permanent atrial fibrillation [I48.2]  Yes     Priority: 4      2004: Diagnosed with permanent atrial fibrillation.  On warfarin. Has mechanical mitral valve.        Hypercholesterolemia [E78.00]  Yes     2004: Began statin.        Resolved Hospital Problems    Diagnosis Date Resolved POA    Supratherapeutic INR [R79.1] 12/07/2017 Yes     Priority: 2        Allergies:  Review of patient's allergies indicates:   Allergen Reactions    Pcn [penicillins] Rash       Vitals:       Every shift (Skilled Nursing patients)    Diet: Cardiac diet    Activity:      - Up in a chair each morning as tolerated   - Ambulate with assistance to bathroom    Nursing Precautions:             - Fall precautions     - Decubitus precautions:        -  for positioning   - Pressure reducing foam mattress   - Turn patient every two hours. Use wedge pillows to anchor patient    CONSULTS:      PT to evaluate and treat - five times a week     OT to evaluate and treat - five times a week     Nutrition to evaluate and recommend diet      LABS:  INR Qmonth    Medications: Discontinue all previous medication orders, if any. See new list below.       Rikki Morrison   Home Medication Instructions SALLY:[id number]    Printed on:12/07/17 1600   Medication Information                       amlodipine (NORVASC) 2.5 MG tablet  TAKE ONE TABLET BY MOUTH ONCE DAILY             aspirin (ECOTRIN) 81 MG EC tablet  Take 1 tablet (81 mg total) by mouth once daily.             cholecalciferol, vitamin D3, (VITAMIN D3) 1,000 unit capsule  Take 1,000 Units by mouth once daily.             cyanocobalamin, vitamin B-12, 1,000 mcg Subl  Place 1 tablet under the tongue once daily.             ferrous sulfate 325 (65 FE) MG EC tablet  Take 325 mg by mouth once daily. at dinner             finasteride (PROSCAR) 5 mg tablet  TAKE ONE TABLET BY MOUTH ONCE DAILY             fish oil-omega-3 fatty acids 300-1,000 mg capsule  Take 1 g by mouth once daily.             galantamine (RAZADYNE) 8 MG Tab  Take 8 mg by mouth every evening.              memantine (NAMENDA) 10 MG Tab  Take 10 mg by mouth 2 (two) times daily.              metoprolol succinate (TOPROL-XL) 50 MG 24 hr tablet  TAKE ONE TABLET BY MOUTH ONCE DAILY             nitroGLYCERIN (NITROSTAT) 0.4 MG SL tablet  Place 1 tablet (0.4 mg total) under the tongue every 5 (five) minutes as needed for Chest pain.             omeprazole (PRILOSEC) 20 MG capsule  TAKE ONE CAPSULE BY MOUTH TWICE DAILY             potassium citrate (UROCIT-K) 10 mEq (1,080 mg) TbSR  Take 1 tablet (10 mEq total) by mouth 3 (three) times daily with meals.             QUEtiapine (SEROQUEL) 25 MG Tab  Take 25 mg by mouth once daily. 1 hour prior to bed time             rosuvastatin (CRESTOR) 5 MG tablet  Take 1 tablet (5 mg total) by mouth every evening.             trospium (SANCTURA XR) 60 mg Cp24 capsule  Take 60 mg by mouth every morning.              UBIDECARENONE (COENZYME Q10) 200 mg Tab  Take 1 capsule by mouth once daily.              vortioxetine (TRINTELLIX) 10 mg Tab  Take 10 mg by mouth once daily.             warfarin (COUMADIN) 5 MG tablet  TAKE ONE TABLET BY MOUTH ONCE DAILY AT BEDTIME                       _________________________________  Dinah Phan MD  12/07/2017

## 2017-12-07 NOTE — PT/OT/SLP PROGRESS
Occupational Therapy   Treatment    Name: Rikki Morrison  MRN: [id number]  Admitting Diagnosis:  Gait disturbance       Recommendations:     Discharge Recommendations: nursing facility, skilled  Discharge Equipment Recommendations:  none  Barriers to discharge:  Decreased caregiver support (increased caregiver burden)    Subjective     Communicated with: RN prior to session.  Pain/Comfort:  · Pain Rating 1: 0/10  · Pain Rating Post-Intervention 1: 0/10    Objective:     Patient found with: bed alarm, SCD, peripheral IV, telemetry     General Precautions: Standard, fall, hearing impaired   Orthopedic Precautions:N/A   Braces: N/A     Occupational Performance:    Bed Mobility:    · Patient completed Scooting/Bridging with minimum assistance and maximal assistance  · Patient completed Supine to Sit with minimum assistance     Functional Mobility/Transfers:  · Patient completed Sit <> Stand Transfer with moderate assistance and of 2 persons  with  rolling walker - from elevated EOB, pt only required mod (A) x1 person  · Patient completed Bed <> Chair Transfer using Stand Pivot technique with maximal assistance with rolling walker- cues for safety- pt becomes rigid and begins his descent into the chair prior to proper body positioning   · Ambulated house-hold distance using RW with min-mod(A); Once at sink, pt began to descend as if sitting in chair without cueing therapist. Unable to come back into standing therefore chair brought over for patient to sit down. Noted to be having BM at this time.     Activities of Daily Living:  · Grooming: moderate assistance requires cues for initiation, s/u of all G/H utensils and (A) for safety and thoroughness   · UB Dressing: total assistance to don gown like robe secondary to impaired motor planning  · LB Dressing: maximal assistance Pt able to doff/don (L) sock, however requird therapsit to assist with (L) sock; assume increased level of (A) for donning of pants   · Toileting:  total assistance and of 2 persons pt incontinent of bowel in bedside chair- required mod (A) for standing balance x1 person and x1 person to complete hygiene      Patient left up in chair with all lines intact, call button in reach, RN notified and wife present    [facility] 6 Click:  [facility] Total Score: 9    Treatment & Education:  OT role and POC. Transfer technique and safety with ADLs and functional mobility.   Education:    Assessment:     Rikki Morrison is a 77 y.o. male with a medical diagnosis of Gait disturbance.  He presents with the following Performance deficits affecting function: weakness, impaired endurance, impaired self care skills, impaired functional mobilty, gait instability, impaired balance, impaired cognition, decreased coordination, decreased upper extremity function, decreased lower extremity function, decreased safety awareness, abnormal tone, decreased ROM, impaired fine motor.  Noted improvements in pt's participation with LB dressing and G/H tasks. Pt with improved motor planning and transferring towards beginning of session. With onset of fatigue, pt required mod (A) x2 persons for sit>stand transfers from lower surfaces. Pt incontinent of bowel during session and required total (A) for hygiene. Pt continues to demonstrate impulsivity and uncontrolled descents into chair- pt with tendency to start sitting down without communicating to person assisting and proper set up. Pt remains appropriate for acute OT services and would continue to benefit from SNF upon D/C to maximize his return to PLOF.     Rehab Prognosis:  Fair ; patient would benefit from acute skilled OT services to address these deficits and reach maximum level of function.       Plan:     Patient to be seen 6 x/week to address the above listed problems via self-care/home management, therapeutic activities, therapeutic exercises, cognitive retraining  · Plan of Care Expires: 01/05/18  · Plan of Care Reviewed with: patient,  spouse    This Plan of care has been discussed with the patient who was involved in its development and understands and is in agreement with the identified goals and treatment plan    GOALS:    Occupational Therapy Goals        Problem: Occupational Therapy Goal    Goal Priority Disciplines Outcome Interventions   Occupational Therapy Goal     OT, PT/OT Ongoing (interventions implemented as appropriate)    Description:  Goals to be met by: 12/15/2017  Patient will increase functional independence with ADLs by performing:    Feeding with Supervision.  UE Dressing with Contact Guard Assistance.  LE Dressing with Moderate Assistance.  Grooming while seated with Stand-by Assistance.  Toileting from bedside commode with Maximum Assistance for hygiene and clothing management.   Rolling to Bilateral with Stand-by Assistance.   Supine to sit with Stand-by Assistance.  Stand pivot transfers with Contact Guard Assistance.  Toilet transfer to bedside commode with Contact Guard Assistance.  Increased functional strength to WFL for BUE.  Upper extremity exercise program 10 reps per handout, with assistance as needed.                      Time Tracking:     OT Date of Treatment: 12/07/17  OT Start Time: 1000  OT Stop Time: 1043  OT Total Time (min): 43 min    Billable Minutes:Self Care/Home Management 43    Karma Bird OTR/L  12/7/2017

## 2017-12-08 VITALS
HEART RATE: 81 BPM | RESPIRATION RATE: 18 BRPM | HEIGHT: 72 IN | OXYGEN SATURATION: 94 % | TEMPERATURE: 98 F | WEIGHT: 180 LBS | BODY MASS INDEX: 24.38 KG/M2 | DIASTOLIC BLOOD PRESSURE: 64 MMHG | SYSTOLIC BLOOD PRESSURE: 115 MMHG

## 2017-12-08 LAB
ANION GAP SERPL CALC-SCNC: 9 MMOL/L
BASOPHILS # BLD AUTO: 0.01 K/UL
BASOPHILS NFR BLD: 0.1 %
BUN SERPL-MCNC: 16 MG/DL
CALCIUM SERPL-MCNC: 9.7 MG/DL
CHLORIDE SERPL-SCNC: 107 MMOL/L
CO2 SERPL-SCNC: 22 MMOL/L
CREAT SERPL-MCNC: 0.8 MG/DL
DIFFERENTIAL METHOD: ABNORMAL
EOSINOPHIL # BLD AUTO: 0.4 K/UL
EOSINOPHIL NFR BLD: 3.9 %
ERYTHROCYTE [DISTWIDTH] IN BLOOD BY AUTOMATED COUNT: 14.3 %
EST. GFR  (AFRICAN AMERICAN): >60 ML/MIN/1.73 M^2
EST. GFR  (NON AFRICAN AMERICAN): >60 ML/MIN/1.73 M^2
GLUCOSE SERPL-MCNC: 121 MG/DL
HCT VFR BLD AUTO: 36.4 %
HGB BLD-MCNC: 11.8 G/DL
INR PPP: 1.9
LYMPHOCYTES # BLD AUTO: 2.4 K/UL
LYMPHOCYTES NFR BLD: 26.1 %
MAGNESIUM SERPL-MCNC: 2 MG/DL
MCH RBC QN AUTO: 30.3 PG
MCHC RBC AUTO-ENTMCNC: 32.4 G/DL
MCV RBC AUTO: 94 FL
MONOCYTES # BLD AUTO: 0.9 K/UL
MONOCYTES NFR BLD: 9.2 %
NEUTROPHILS # BLD AUTO: 5.6 K/UL
NEUTROPHILS NFR BLD: 60.1 %
PHOSPHATE SERPL-MCNC: 2.6 MG/DL
PLATELET # BLD AUTO: 370 K/UL
PMV BLD AUTO: 9.6 FL
POCT GLUCOSE: 130 MG/DL (ref 70–110)
POCT GLUCOSE: 154 MG/DL (ref 70–110)
POTASSIUM SERPL-SCNC: 3.5 MMOL/L
PROTHROMBIN TIME: 20.4 SEC
RBC # BLD AUTO: 3.89 M/UL
SODIUM SERPL-SCNC: 138 MMOL/L
TB INDURATION 48 - 72 HR READ: 0 MM
WBC # BLD AUTO: 9.24 K/UL

## 2017-12-08 PROCEDURE — 97530 THERAPEUTIC ACTIVITIES: CPT

## 2017-12-08 PROCEDURE — 83735 ASSAY OF MAGNESIUM: CPT

## 2017-12-08 PROCEDURE — 85610 PROTHROMBIN TIME: CPT

## 2017-12-08 PROCEDURE — 36415 COLL VENOUS BLD VENIPUNCTURE: CPT

## 2017-12-08 PROCEDURE — G8980 MOBILITY D/C STATUS: HCPCS | Mod: CL

## 2017-12-08 PROCEDURE — 25000003 PHARM REV CODE 250: Performed by: PHYSICIAN ASSISTANT

## 2017-12-08 PROCEDURE — G0378 HOSPITAL OBSERVATION PER HR: HCPCS

## 2017-12-08 PROCEDURE — 97535 SELF CARE MNGMENT TRAINING: CPT

## 2017-12-08 PROCEDURE — G8989 SELF CARE D/C STATUS: HCPCS | Mod: CL

## 2017-12-08 PROCEDURE — G8988 SELF CARE GOAL STATUS: HCPCS | Mod: CL

## 2017-12-08 PROCEDURE — 99217 PR OBSERVATION CARE DISCHARGE: CPT | Mod: ,,, | Performed by: HOSPITALIST

## 2017-12-08 PROCEDURE — 25000003 PHARM REV CODE 250: Performed by: HOSPITALIST

## 2017-12-08 PROCEDURE — 80048 BASIC METABOLIC PNL TOTAL CA: CPT

## 2017-12-08 PROCEDURE — 25000003 PHARM REV CODE 250

## 2017-12-08 PROCEDURE — G8978 MOBILITY CURRENT STATUS: HCPCS | Mod: CL

## 2017-12-08 PROCEDURE — 84100 ASSAY OF PHOSPHORUS: CPT

## 2017-12-08 PROCEDURE — 85025 COMPLETE CBC W/AUTO DIFF WBC: CPT

## 2017-12-08 PROCEDURE — 97116 GAIT TRAINING THERAPY: CPT

## 2017-12-08 RX ORDER — SYRING-NEEDL,DISP,INSUL,0.3 ML 29 G X1/2"
296 SYRINGE, EMPTY DISPOSABLE MISCELLANEOUS ONCE
Status: COMPLETED | OUTPATIENT
Start: 2017-12-08 | End: 2017-12-08

## 2017-12-08 RX ADMIN — AMLODIPINE BESYLATE 2.5 MG: 2.5 TABLET ORAL at 08:12

## 2017-12-08 RX ADMIN — QUETIAPINE FUMARATE 25 MG: 25 TABLET, FILM COATED ORAL at 08:12

## 2017-12-08 RX ADMIN — POLYETHYLENE GLYCOL 3350 17 G: 17 POWDER, FOR SOLUTION ORAL at 08:12

## 2017-12-08 RX ADMIN — MAGESIUM CITRATE 296 ML: 1.75 LIQUID ORAL at 08:12

## 2017-12-08 RX ADMIN — WARFARIN SODIUM 5 MG: 5 TABLET ORAL at 05:12

## 2017-12-08 RX ADMIN — MEMANTINE HYDROCHLORIDE 5 MG: 5 TABLET ORAL at 08:12

## 2017-12-08 RX ADMIN — METOPROLOL SUCCINATE 50 MG: 50 TABLET, EXTENDED RELEASE ORAL at 08:12

## 2017-12-08 RX ADMIN — ASPIRIN 81 MG: 81 TABLET, COATED ORAL at 08:12

## 2017-12-08 RX ADMIN — FINASTERIDE 5 MG: 5 TABLET, FILM COATED ORAL at 08:12

## 2017-12-08 NOTE — PLAN OF CARE
Eileen Guardado admissions coordinator from [place] [facility] reports patient has been accepted to & will admit to room 116. Report to be called to first floor nurse at [phone]. [place] [facility] will cover transport via [employer] wheelchair van per Eileen. Transportation set up.  scheduled for 4pm. Wife updated. DEO arana

## 2017-12-08 NOTE — PLAN OF CARE
Problem: Patient Care Overview  Goal: Plan of Care Review  Outcome: Outcome(s) achieved Date Met: 12/07/17  Patient up in chair this shift .Received enema this shift with no form stool only brown liquid.

## 2017-12-08 NOTE — PROGRESS NOTES
[facility]  Cardiology  Progress Note    Patient Name: Rikki Morrison  MRN: [id number]  Admission Date: 12/5/2017  Hospital Length of Stay: 0 days  Code Status: Full Code   Attending Physician: Dinah Phan MD   Primary Care Physician: ODALYS Villafuerte MD  Expected Discharge Date: 12/7/2017  Principal Problem:Gait disturbance    Subjective:     Brief HPI:     Rikki Morrison is a 77 y.o. male with hypercholesterolemia and diabetes mellitus type 2. He used to be overweight but lost weight being demented. 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 In 2004 he underwent mitral valve replacement receiving a 33 mm [employer]. Cyril valve. He has chronic atrial fibrillation. He was diagnosed with dementia in the spring of 2017. He denies any exertional chest pain or exertional dyspnea. No palpitations or weak spells. Usually happy and feeling well overall.      Had a fall in the backyard on 11/28/2017. Brusing right hip. He was seen in Dr. Conn's office and Xrays were negative for fracture. Came to [facility] on 12/4/2017 mostly due to constipation. Unable to stand. Admitted.    Hospital Course:     PT.    Interval History:    Been up on his feet and sitting in chair. Good appetite tonight. Plan is SNF.    Review of Systems   Cardiovascular: Negative for chest pain.   Respiratory: Negative for shortness of breath.      Objective:     Vital Signs (Most Recent):  Temp: 96.9 °F (36.1 °C) (12/07/17 1958)  Pulse: 81 (12/07/17 1958)  Resp: 18 (12/07/17 1958)  BP: 117/61 (12/07/17 1958)  SpO2: 97 % (12/07/17 1958) Vital Signs (24h Range):  Temp:  [96.9 °F (36.1 °C)-98.4 °F (36.9 °C)] 96.9 °F (36.1 °C)  Pulse:  [78-87] 81  Resp:  [18] 18  SpO2:  [91 %-99 %] 97 %  BP: ()/(56-70) 117/61     Weight: 81.6 kg (180 lb)  Body mass index is 24.41 kg/m².    SpO2: 97 %  O2 Device (Oxygen Therapy): room air    No intake or output data in the 24 hours ending 12/07/17 2152    Lines/Drains/Airways      Peripheral Intravenous Line                 Peripheral IV - Single Lumen 12/05/17 0058 Right Antecubital 2 days                Physical Exam   Cardiovascular: Normal rate and S2 normal.  An irregularly irregular rhythm present.   Murmur heard.  Mechanical S1.   Pulmonary/Chest: Effort normal and breath sounds normal.   Skin: Skin is warm and dry. Ecchymosis noted.   Ecchymosis right leg.       Current Medications:     amLODIPine  2.5 mg Oral Daily    aspirin  81 mg Oral Daily    finasteride  5 mg Oral Daily    memantine  5 mg Oral BID    metoprolol succinate  50 mg Oral Daily    polyethylene glycol  17 g Oral Daily    QUEtiapine  25 mg Oral Daily    rosuvastatin  5 mg Oral QHS    trospium  60 mg Oral Daily    vortioxetine  1 tablet Oral Daily    warfarin  5 mg Oral Daily     Current Laboratory Results:    Recent Results (from the past 24 hour(s))   Magnesium    Collection Time: 12/07/17  4:23 AM   Result Value Ref Range    Magnesium 2.2 1.6 - 2.6 mg/dL   Phosphorus    Collection Time: 12/07/17  4:23 AM   Result Value Ref Range    Phosphorus 2.9 2.7 - 4.5 mg/dL   Basic Metabolic Panel (BMP)    Collection Time: 12/07/17  4:23 AM   Result Value Ref Range    Sodium 138 136 - 145 mmol/L    Potassium 3.3 (L) 3.5 - 5.1 mmol/L    Chloride 106 95 - 110 mmol/L    CO2 25 23 - 29 mmol/L    Glucose 117 (H) 70 - 110 mg/dL    BUN, Bld 19 8 - 23 mg/dL    Creatinine 0.9 0.5 - 1.4 mg/dL    Calcium 9.6 8.7 - 10.5 mg/dL    Anion Gap 7 (L) 8 - 16 mmol/L    eGFR if African American >60 >60 mL/min/1.73 m^2    eGFR if non African American >60 >60 mL/min/1.73 m^2   CBC with Automated Differential    Collection Time: 12/07/17  4:23 AM   Result Value Ref Range    WBC 8.56 3.90 - 12.70 K/uL    RBC 3.75 (L) 4.60 - 6.20 M/uL    Hemoglobin 11.5 (L) 14.0 - 18.0 g/dL    Hematocrit 35.2 (L) 40.0 - 54.0 %    MCV 94 82 - 98 fL    MCH 30.7 27.0 - 31.0 pg    MCHC 32.7 32.0 - 36.0 g/dL    RDW 14.4 11.5 - 14.5 %    Platelets 363 (H) 150 - 350  K/uL    MPV 9.3 9.2 - 12.9 fL    Gran # 4.8 1.8 - 7.7 K/uL    Lymph # 2.4 1.0 - 4.8 K/uL    Mono # 0.9 0.3 - 1.0 K/uL    Eos # 0.4 0.0 - 0.5 K/uL    Baso # 0.02 0.00 - 0.20 K/uL    Gran% 56.5 38.0 - 73.0 %    Lymph% 27.7 18.0 - 48.0 %    Mono% 10.4 4.0 - 15.0 %    Eosinophil% 4.4 0.0 - 8.0 %    Basophil% 0.2 0.0 - 1.9 %    Differential Method Automated    PT/INR    Collection Time: 12/07/17  4:23 AM   Result Value Ref Range    Prothrombin Time 24.8 (H) 9.0 - 12.5 sec    INR 2.3 (H) 0.8 - 1.2   POCT glucose    Collection Time: 12/07/17  8:13 AM   Result Value Ref Range    POCT Glucose 149 (H) 70 - 110 mg/dL   POCT glucose    Collection Time: 12/07/17 12:24 PM   Result Value Ref Range    POCT Glucose 178 (H) 70 - 110 mg/dL   POCT glucose    Collection Time: 12/07/17  3:53 PM   Result Value Ref Range    POCT Glucose 203 (H) 70 - 110 mg/dL   POCT glucose    Collection Time: 12/07/17  9:37 PM   Result Value Ref Range    POCT Glucose 172 (H) 70 - 110 mg/dL     Current Imaging Results:    Imaging Results          X-Ray Chest 1 View (Final result)     Abnormal  Result time 12/06/17 08:26:15    Final result by Fito Lockhart MD (12/06/17 08:26:15)                 Impression:        No acute cardiothoracic disease.Mild central vascular congestion.    Same day abdominal radiograph showed marked distention of the colon, which may account for the lucency under the right hemidiaphragm. However, pneumoperitoneum cannot be completely excluded. Please correlate clinically and obtain followup as indicated.    Electronically signed by: FITO LOCKHART MD  Date:     12/06/17  Time:    08:26              Narrative:    PORTABLE AP CHEST:      Comparison: Abdominal radiograph, same date    Findings:     No consolidations. Mild central vascular congestion. There is no pleural effusion or pneumothorax. The cardiac silhouette is normal in size.  There are no acute bony abnormalities. And a radiograph showed marked distention of the  colon, which may account for the lucency under the right hemidiaphragm.                             CT Lower Extremity Without Cont Right (Final result)  Result time 12/05/17 03:16:19    Final result by Luis Enrique Morrell MD (12/05/17 03:16:19)                 Impression:      Significantly motion limited examination. No obvious fracture.    Suprapatellar joint effusion.      Electronically signed by: Luis Enrique Morrell  Date:     12/05/17  Time:    03:16              Narrative:    CLINICAL INDICATION:  Hip pain.    TECHNIQUE: CT images of the left lower extremity from the left hip through the left knee were obtained. No IV or oral contrast was utilized. Images were obtained utilizing bone window algorithm, which significantly limits evaluation of the soft tissues at the expense of improved bone characterization. Multiplanar reconstructions were created.       COMPARISON: Right hip x-rays, same day.    FINDINGS:  Motion limits evaluation.    There is no displaced fracture or dislocation. No age advanced degenerative changes involving the right hip.    Motion severely limits evaluation of the right knee. No large fracture. There is a suprapatellar joint effusion.    Urinary bladder is mildly distended. Probable right hydrocele. There are vascular calcifications. There is a metallic radiopaque structure in the right thigh superficial soft tissues (axial series 5, image 20). There are vascular calcifications.                             X-Ray Abdomen AP 1 View (KUB) (Final result)  Result time 12/05/17 01:33:40    Final result by Luis Enrique Morrell MD (12/05/17 01:33:40)                 Impression:    Marked gaseous distention of the colon and a moderate volume of solid stool.      Electronically signed by: Luis Enrique Morrell  Date:     12/05/17  Time:    01:33              Narrative:    EXAM: ABDOMEN 1 VIEW    CLINICAL INDICATION:  Constipation.    COMPARISON:  None.    TECHNIQUE:  Single AP supine view of the abdomen was  obtained.    FINDINGS:  There is marked gaseous distention of the colon and moderate volume of solid stool.  No large fecal burden in the rectum. Evaluation for pneumoperitoneum is limited on this supine radiograph. There are degenerative changes in the lumbar spine.                             X-Ray Hip 2 or 3 views Right (Final result)  Result time 12/05/17 01:30:50    Final result by Luis Enrique Morrell MD (12/05/17 01:30:50)                 Impression:        No acute bony abnormality.      Electronically signed by: Luis Enrique Morrell  Date:     12/05/17  Time:    01:30              Narrative:    COMPARISON: None.    CLINICAL INFORMATION: Right hip pain.    FINDINGS: Two views of the right hip.  No displaced fracture. Normal alignment at the hip joint. No age advanced degenerative changes. No radiopaque foreign body.                                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 Mitral Valve Diseas              2004: Mitral valve replacement. [employer]. Cyril Mechanical Valve.              On warfarin and aspirin.     2. History of Mitral Valve Endocarditis              1963: Bacterial endocarditis of the mitral valve.     3. Atrial Fibrillation              2004: Diagnosed with permanent atrial fibrillation.              On warfarin. Has mechanical mitral valve.              On metoprolol 50 mg Q24.              Rate  appears well controlled.     4. Chronic Anticoagulation              2004: Began warfarin. Anticoagulation managed by Dr. Gamino.               Indication: Mechanical mitral valve and atrial fibrillation              Target INR 2.5-3.0              On warfarin and aspirin 81 mg Q24.   10/16/2017 at 4:38 PM CDT   10/16/2017: 7.5 mg 3/7 and 5mg 4/7. Plan was check INR in 1 week but never done.              INR 5.0 on presentation.   12/6/2017: INR 3.3.   12/6/2017: Resumed warfarin at 5 mg Q24.     5. Hypercholesterolemia              2004: Began statin.              10/25/2016: Chol 150. HDL 33. LDL 90. .              On rosuvastatin 5 mg Q24.     6. Diabetes Mellitus, Type 2              2008: Diagnosed. Complications: None. Medications: Diet.              Diet controlled.      7. History of Overweight              3/22/2017: Weight 84 kg. BMI 26.              6/21/2017: Weight 80 kg. BMI 25.              Now fine.     8. Dementia              2016: Diagnosed.              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              On donepezil 10 mg Q24.     9. Fall              Getting PT.     10. Primary Care              Dr. Jj Villafuerte.      VTE Risk Mitigation         Ordered     warfarin (COUMADIN) tablet 5 mg  Daily     Route:  Oral        12/07/17 1501     Place MYRTLE hose  Until discontinued      12/05/17 0609     Medium Risk of VTE  Once      12/05/17 0434     Reason for No Pharmacological VTE Prophylaxis  Once      12/05/17 0434     Reason for no Mechanical VTE Prophylaxis  Once      12/05/17 0434          Sherita Gamino MD  Cardiology  [facility]

## 2017-12-08 NOTE — PROGRESS NOTES
[facility]  Cardiology  Progress Note    Patient Name: Rikki Morrison  MRN: [id number]  Admission Date: 12/5/2017  Hospital Length of Stay: 0 days  Code Status: Full Code   Attending Physician: Dinah Phan MD   Primary Care Physician: ODALYS Villafuerte MD  Expected Discharge Date: 12/8/2017  Principal Problem:Gait disturbance    Subjective:     Brief HPI:     Rikki Morrison is a 77 y.o. male with hypercholesterolemia and diabetes mellitus type 2. He used to be overweight but lost weight being demented. 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 In 2004 he underwent mitral valve replacement receiving a 33 mm [employer]. Cyril valve. He has chronic atrial fibrillation. He was diagnosed with dementia in the spring of 2017. He denies any exertional chest pain or exertional dyspnea. No palpitations or weak spells. Usually happy and feeling well overall.      Had a fall in the backyard on 11/28/2017. Brusing right hip. He was seen in Dr. Conn's office and Xrays were negative for fracture. Came to [facility] on 12/4/2017 mostly due to constipation. Unable to stand. Admitted.    Hospital Course:     PT.    Interval History:    Been up on his feet and sitting in chair. Good appetite tonight. Plan is SNF.    Review of Systems   Cardiovascular: Negative for chest pain.   Respiratory: Negative for shortness of breath.      Objective:     Vital Signs (Most Recent):  Temp: 98 °F (36.7 °C) (12/08/17 1141)  Pulse: 81 (12/08/17 1141)  Resp: 18 (12/08/17 1141)  BP: 115/64 (12/08/17 1141)  SpO2: (!) 94 % (12/08/17 1141) Vital Signs (24h Range):  Temp:  [96.9 °F (36.1 °C)-98 °F (36.7 °C)] 98 °F (36.7 °C)  Pulse:  [75-82] 81  Resp:  [16-18] 18  SpO2:  [92 %-97 %] 94 %  BP: (115-149)/(61-70) 115/64     Weight: 81.6 kg (180 lb)  Body mass index is 24.41 kg/m².    SpO2: (!) 94 %  O2 Device (Oxygen Therapy): room air      Intake/Output Summary (Last 24 hours) at 12/08/17 161  Last data filed at 12/08/17  0900   Gross per 24 hour   Intake                0 ml   Output              300 ml   Net             -300 ml       Lines/Drains/Airways          No matching active lines, drains, or airways          Physical Exam   Cardiovascular: Normal rate and S2 normal.  An irregularly irregular rhythm present.   Murmur heard.  Mechanical S1.   Pulmonary/Chest: Effort normal and breath sounds normal.   Skin: Skin is warm and dry. Ecchymosis noted.   Ecchymosis right leg.       Current Medications:     amLODIPine  2.5 mg Oral Daily    aspirin  81 mg Oral Daily    finasteride  5 mg Oral Daily    memantine  5 mg Oral BID    metoprolol succinate  50 mg Oral Daily    polyethylene glycol  17 g Oral Daily    QUEtiapine  25 mg Oral Daily    rosuvastatin  5 mg Oral QHS    trospium  60 mg Oral Daily    vortioxetine  1 tablet Oral Daily    warfarin  5 mg Oral Daily     Current Laboratory Results:    Recent Results (from the past 24 hour(s))   POCT glucose    Collection Time: 12/07/17  9:37 PM   Result Value Ref Range    POCT Glucose 172 (H) 70 - 110 mg/dL   Magnesium    Collection Time: 12/08/17  4:27 AM   Result Value Ref Range    Magnesium 2.0 1.6 - 2.6 mg/dL   Phosphorus    Collection Time: 12/08/17  4:27 AM   Result Value Ref Range    Phosphorus 2.6 (L) 2.7 - 4.5 mg/dL   Basic Metabolic Panel (BMP)    Collection Time: 12/08/17  4:27 AM   Result Value Ref Range    Sodium 138 136 - 145 mmol/L    Potassium 3.5 3.5 - 5.1 mmol/L    Chloride 107 95 - 110 mmol/L    CO2 22 (L) 23 - 29 mmol/L    Glucose 121 (H) 70 - 110 mg/dL    BUN, Bld 16 8 - 23 mg/dL    Creatinine 0.8 0.5 - 1.4 mg/dL    Calcium 9.7 8.7 - 10.5 mg/dL    Anion Gap 9 8 - 16 mmol/L    eGFR if African American >60 >60 mL/min/1.73 m^2    eGFR if non African American >60 >60 mL/min/1.73 m^2   CBC with Automated Differential    Collection Time: 12/08/17  4:27 AM   Result Value Ref Range    WBC 9.24 3.90 - 12.70 K/uL    RBC 3.89 (L) 4.60 - 6.20 M/uL    Hemoglobin 11.8 (L)  14.0 - 18.0 g/dL    Hematocrit 36.4 (L) 40.0 - 54.0 %    MCV 94 82 - 98 fL    MCH 30.3 27.0 - 31.0 pg    MCHC 32.4 32.0 - 36.0 g/dL    RDW 14.3 11.5 - 14.5 %    Platelets 370 (H) 150 - 350 K/uL    MPV 9.6 9.2 - 12.9 fL    Gran # 5.6 1.8 - 7.7 K/uL    Lymph # 2.4 1.0 - 4.8 K/uL    Mono # 0.9 0.3 - 1.0 K/uL    Eos # 0.4 0.0 - 0.5 K/uL    Baso # 0.01 0.00 - 0.20 K/uL    Gran% 60.1 38.0 - 73.0 %    Lymph% 26.1 18.0 - 48.0 %    Mono% 9.2 4.0 - 15.0 %    Eosinophil% 3.9 0.0 - 8.0 %    Basophil% 0.1 0.0 - 1.9 %    Differential Method Automated    PT/INR    Collection Time: 12/08/17  4:27 AM   Result Value Ref Range    Prothrombin Time 20.4 (H) 9.0 - 12.5 sec    INR 1.9 (H) 0.8 - 1.2   POCT glucose    Collection Time: 12/08/17  7:41 AM   Result Value Ref Range    POCT Glucose 130 (H) 70 - 110 mg/dL   POCT TB Skin Test Read    Collection Time: 12/08/17 10:35 AM   Result Value Ref Range    TB Induration 48 - 72 hr read 0 mm   POCT glucose    Collection Time: 12/08/17 11:40 AM   Result Value Ref Range    POCT Glucose 154 (H) 70 - 110 mg/dL     Current Imaging Results:    Imaging Results          X-Ray Chest 1 View (Final result)     Abnormal  Result time 12/06/17 08:26:15    Final result by Fito Lockhart MD (12/06/17 08:26:15)                 Impression:        No acute cardiothoracic disease.Mild central vascular congestion.    Same day abdominal radiograph showed marked distention of the colon, which may account for the lucency under the right hemidiaphragm. However, pneumoperitoneum cannot be completely excluded. Please correlate clinically and obtain followup as indicated.    Electronically signed by: FITO LOCKHART MD  Date:     12/06/17  Time:    08:26              Narrative:    PORTABLE AP CHEST:      Comparison: Abdominal radiograph, same date    Findings:     No consolidations. Mild central vascular congestion. There is no pleural effusion or pneumothorax. The cardiac silhouette is normal in size.  There are no  acute bony abnormalities. And a radiograph showed marked distention of the colon, which may account for the lucency under the right hemidiaphragm.                             CT Lower Extremity Without Cont Right (Final result)  Result time 12/05/17 03:16:19    Final result by Luis Enrique Morrell MD (12/05/17 03:16:19)                 Impression:      Significantly motion limited examination. No obvious fracture.    Suprapatellar joint effusion.      Electronically signed by: Luis Enrique Morrell  Date:     12/05/17  Time:    03:16              Narrative:    CLINICAL INDICATION:  Hip pain.    TECHNIQUE: CT images of the left lower extremity from the left hip through the left knee were obtained. No IV or oral contrast was utilized. Images were obtained utilizing bone window algorithm, which significantly limits evaluation of the soft tissues at the expense of improved bone characterization. Multiplanar reconstructions were created.       COMPARISON: Right hip x-rays, same day.    FINDINGS:  Motion limits evaluation.    There is no displaced fracture or dislocation. No age advanced degenerative changes involving the right hip.    Motion severely limits evaluation of the right knee. No large fracture. There is a suprapatellar joint effusion.    Urinary bladder is mildly distended. Probable right hydrocele. There are vascular calcifications. There is a metallic radiopaque structure in the right thigh superficial soft tissues (axial series 5, image 20). There are vascular calcifications.                             X-Ray Abdomen AP 1 View (KUB) (Final result)  Result time 12/05/17 01:33:40    Final result by Luis Enrique Morrell MD (12/05/17 01:33:40)                 Impression:    Marked gaseous distention of the colon and a moderate volume of solid stool.      Electronically signed by: Luis Enrique Morrell  Date:     12/05/17  Time:    01:33              Narrative:    EXAM: ABDOMEN 1 VIEW    CLINICAL INDICATION:   Constipation.    COMPARISON:  None.    TECHNIQUE:  Single AP supine view of the abdomen was obtained.    FINDINGS:  There is marked gaseous distention of the colon and moderate volume of solid stool.  No large fecal burden in the rectum. Evaluation for pneumoperitoneum is limited on this supine radiograph. There are degenerative changes in the lumbar spine.                             X-Ray Hip 2 or 3 views Right (Final result)  Result time 12/05/17 01:30:50    Final result by Luis Enrique Morrell MD (12/05/17 01:30:50)                 Impression:        No acute bony abnormality.      Electronically signed by: Luis Enrique Morrell  Date:     12/05/17  Time:    01:30              Narrative:    COMPARISON: None.    CLINICAL INFORMATION: Right hip pain.    FINDINGS: Two views of the right hip.  No displaced fracture. Normal alignment at the hip joint. No age advanced degenerative changes. No radiopaque foreign body.                                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 Mitral Valve Diseas              2004: Mitral valve replacement. St. Cyril Mechanical Valve.              On warfarin and aspirin.     2. History of Mitral Valve Endocarditis              1963: Bacterial endocarditis of the mitral valve.     3. Atrial Fibrillation              2004: Diagnosed with permanent atrial fibrillation.              On  warfarin. Has mechanical mitral valve.              On metoprolol 50 mg Q24.              Rate appears well controlled.     4. Chronic Anticoagulation              2004: Began warfarin. Anticoagulation managed by Dr. Gamino.               Indication: Mechanical mitral valve and atrial fibrillation              Target INR 2.5-3.0              On warfarin and aspirin 81 mg Q24.   10/16/2017 at 4:38 PM CDT   10/16/2017: 7.5 mg 3/7 and 5mg 4/7. Plan was check INR in 1 week but never done.              INR 5.0 on presentation.   12/6/2017: INR 3.3.   12/6/2017: Resumed warfarin at 5 mg Q24.     5. Hypercholesterolemia              2004: Began statin.              10/25/2016: Chol 150. HDL 33. LDL 90. .              On rosuvastatin 5 mg Q24.     6. Diabetes Mellitus, Type 2              2008: Diagnosed. Complications: None. Medications: Diet.              Diet controlled.      7. History of Overweight              3/22/2017: Weight 84 kg. BMI 26.              6/21/2017: Weight 80 kg. BMI 25.              Now fine.     8. Dementia              2016: Diagnosed.              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              On donepezil 10 mg Q24.     9. Fall              Getting PT.   Going to [place] today.     10. Primary Care              Dr. Jj Villafuerte.      VTE Risk Mitigation         Ordered     warfarin (COUMADIN) tablet 5 mg  Daily     Route:  Oral        12/07/17 1501     Place MYRTLE hose  Until discontinued      12/05/17 0609     Medium Risk of VTE  Once      12/05/17 0434     Reason for No Pharmacological VTE Prophylaxis  Once      12/05/17 0434     Reason for no Mechanical VTE Prophylaxis  Once      12/05/17 0434          Sherita Gamino MD  Cardiology  [facility]

## 2017-12-08 NOTE — PT/OT/SLP PROGRESS
Occupational Therapy   Treatment    Name: Rikki Morrison  MRN: [id number]  Admitting Diagnosis:  Gait disturbance       Recommendations:     Discharge Recommendations: nursing facility, skilled  Discharge Equipment Recommendations:  none  Barriers to discharge:  Decreased caregiver support (increased caregiver burden)    Subjective     Communicated with: nrsuing prior to session.  Pain/Comfort:  · Pain Rating 1: 0/10  · Pain Rating Post-Intervention 1: 0/10    Objective:     Patient found with:  peripheral IV    General Precautions: Standard, fall, hearing impaired   Orthopedic Precautions:N/A   Braces:   none    Occupational Performance:    Bed Mobility:    · Patient completed Rolling/Turning to Left with  moderate assistance  · Patient completed Rolling/Turning to Right with moderate assistance  · Patient completed Supine to Sit with moderate assistance     Functional Mobility/Transfers:  · Patient completed Sit <> Stand Transfer with moderate assistance and of 2 persons with rolling walker for safety and verbal and physical cues     Activities of Daily Living:  · Feeding:  maximal assistance sitting up in chair   · Toileting: total assistance and of 2 persons bed level    Patient left up in chair with call button in reach, nursing notified and wife present    [facility] 6 Click:  [facility] Total Score: 10    Treatment & Education:  OT POC, functional tranfers, bed mobility, overall activity tolerance, following commands,  Education:    Assessment:     Rikki Morrison is a 77 y.o. male with a medical diagnosis of Gait disturbance.  He presents with the following Performance deficits affecting function are weakness, impaired functional mobilty, decreased safety awareness, impaired cognition, pain, decreased coordination, gait instability, impaired endurance, impaired balance, decreased upper extremity function, decreased lower extremity function, impaired self care skills, impaired coordination, impaired fine motor.  Pt   Required Total A for geoffrey-care after bowel movement, Total A for stand pivot transfers with assistance of 2 people and MAX A for feeding sitting up in bedside chair. Pt required verbal and tactile cues for drinking out of a straw with closed cup, Total A for eating from a spoon (for gumbo) and Min A for bite size and follow through eating bites of sandwich. Pt required increased time and verbal cues to stay awake and alert throughout session.  Pt would benefit from skilled occupational therapy intervention for increased activity tolerance and independence in ADL's.    Rehab Prognosis:  Fair+; patient would benefit from acute skilled OT services to address these deficits and reach maximum level of function.       Plan:     Patient to be seen 6 x/week to address the above listed problems via self-care/home management, therapeutic activities, therapeutic exercises  · Plan of Care Expires: 01/05/18  · Plan of Care Reviewed with: patient, spouse    This Plan of care has been discussed with the patient who was involved in its development and understands and is in agreement with the identified goals and treatment plan    GOALS:    Occupational Therapy Goals        Problem: Occupational Therapy Goal    Goal Priority Disciplines Outcome Interventions   Occupational Therapy Goal     OT, PT/OT Ongoing (interventions implemented as appropriate)    Description:  Goals to be met by: 12/15/2017  Patient will increase functional independence with ADLs by performing:    Feeding with Supervision.  UE Dressing with Contact Guard Assistance.  LE Dressing with Moderate Assistance.  Grooming while seated with Stand-by Assistance.  Toileting from bedside commode with Maximum Assistance for hygiene and clothing management.   Rolling to Bilateral with Stand-by Assistance.   Supine to sit with Stand-by Assistance.  Stand pivot transfers with Contact Guard Assistance.  Toilet transfer to bedside commode with Contact Guard  Assistance.  Increased functional strength to WFL for BUE.  Upper extremity exercise program 10 reps per handout, with assistance as needed.                      Time Tracking:     OT Date of Treatment: 12/08/17  OT Start Time: 1258  OT Stop Time: 1336  OT Total Time (min): 38 min    Billable Minutes:Self Care/Home Management 30  Therapeutic Activity 8     Partial co-treatment with PT    Todd Soto, OT  12/8/2017

## 2017-12-08 NOTE — PLAN OF CARE
Messages left on the voicemails of Eileen Ordoñez in admissions at [facility] to followup on pending transfer to SNF-awaiting return call

## 2017-12-08 NOTE — DISCHARGE SUMMARY
[facility] Hospital Medicine  Discharge Summary      Patient Name: Rikki Morrison  MRN: [id number]  Admission Date: 12/5/2017  Hospital Length of Stay: 0 days  Discharge Date and Time:  12/08/2017 10:08 AM  Attending Physician: Dinah Phan MD   Discharging Provider: Dinah Phan MD  Primary Care Provider: ODALYS Villafuerte MD      HPI:   Mr. Rikki Morrison is a 77 y.o. male, with history of dementia, who was brought in for concern of constipation which started one week ago s/p fall. Per spouse who did not witness fall, she believes patient tripped over a wire in the backyard while she was inside the house. She notes that patient was able to ambulate immediately after the fall but was unable to ambulate later that night. Spouse states that the patient has not had a BM since falling and says that constipation is unchanged with half dose of miralax (given last night). She had been giving him imodium prior to the onset of constipation for diarrhea. She reports associated ecchymosis to the bilateral knees and thighs, as well as weakness and decreased appetite/fluid intake. She states she has been unable to have his INR checked on 11/30/17 as scheduled as he has been unable to ambulate to the car. Per spouse, patient denies fever, chills, nausea, vomiting, head trauma, numbness, abdominal pain, diarrhea, cough, or congestion. Spouse reports that patient was evaluated s/p fall with X-rays performed revealing no hip fracture. She reports a PSHx of mechanical valve replacement and says that patient has been on coumadin since 2004. Of note, history is provided by spouse due to patient's dementia status.    * No surgery found *      Hospital Course:   The patient cannot ambulate and he was evaluated by PT/OT who recommends SNF placement.  The patient is participating with therapy.  The patient had a supratherapeutic INR of 5.0 initially and Coumadin was held.  The patient restarted Coumadin 5mg and  INR on the day of discharge is 1.9.  Goal of anticoagulation is 2.5-3.5.  The patient will transfer to SNF for continued therapy.       Consults:   Consults         Status Ordering Provider     Inpatient consult to Cardiology  Once     Provider:  Sherita Gamino MD    Completed JEROME ZELAYA          No new Assessment & Plan notes have been filed under this hospital service since the last note was generated.  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    Specialty:  Internal Medicine  Contact information:  [address] [place] [address] [address] [place], [place] [id number] [phone]                 Patient Instructions:     Diet general     Diet general   Order Specific Question Answer Comments   Na restriction, if any: 2gNa      Activity as tolerated         Significant Diagnostic Studies: Labs:   CMP   Recent Labs  Lab 12/07/17 0423 12/08/17 0427    138   K 3.3* 3.5    107   CO2 25 22*   * 121*   BUN 19 16   CREATININE 0.9 0.8   CALCIUM 9.6 9.7   ANIONGAP 7* 9   ESTGFRAFRICA >60 >60   EGFRNONAA >60 >60   , CBC   Recent Labs  Lab 12/07/17  0423 12/08/17 0427   WBC 8.56 9.24   HGB 11.5* 11.8*   HCT 35.2* 36.4*   * 370*    and INR   Lab Results   Component Value Date    INR 1.9 (H) 12/08/2017     INR 2.3 (H) 12/07/2017    INR 3.3 (H) 12/06/2017       Pending Diagnostic Studies:     None         Medications:  Reconciled Home Medications:   Current Discharge Medication List      CONTINUE these medications which have NOT CHANGED    Details   amlodipine (NORVASC) 2.5 MG tablet TAKE ONE TABLET BY MOUTH ONCE DAILY  Qty: 90 tablet, Refills: 3      aspirin (ECOTRIN) 81 MG EC tablet Take 1 tablet (81 mg total) by mouth once daily.  Qty: 90 tablet, Refills: 3    Associated Diagnoses: Permanent atrial fibrillation      cholecalciferol, vitamin D3, (VITAMIN D3) 1,000 unit capsule Take 1,000 Units by mouth once daily.      cyanocobalamin, vitamin B-12, 1,000 mcg Subl Place 1 tablet under the tongue once daily.      ferrous sulfate 325 (65 FE) MG EC tablet Take 325 mg by mouth once daily. at dinner      finasteride (PROSCAR) 5 mg tablet TAKE ONE TABLET BY MOUTH ONCE DAILY  Qty: 90 tablet, Refills: 3    Associated Diagnoses: Benign non-nodular prostatic hyperplasia with lower urinary tract symptoms      fish oil-omega-3 fatty acids 300-1,000 mg capsule Take 1 g by mouth once daily.      galantamine (RAZADYNE) 8 MG Tab Take 8 mg by mouth every evening.       memantine (NAMENDA) 10 MG Tab Take 10 mg by mouth 2 (two) times daily.       metoprolol succinate (TOPROL-XL) 50 MG 24 hr tablet TAKE ONE TABLET BY MOUTH ONCE DAILY  Qty: 90 tablet, Refills: 3    Associated Diagnoses: Permanent atrial fibrillation      nitroGLYCERIN (NITROSTAT) 0.4 MG SL tablet Place 1 tablet (0.4 mg total) under the tongue every 5 (five) minutes as needed for Chest pain.  Qty: 10 tablet, Refills: 1      omeprazole (PRILOSEC) 20 MG capsule TAKE ONE CAPSULE BY MOUTH TWICE DAILY  Qty: 180 capsule, Refills: 3      potassium citrate (UROCIT-K) 10 mEq (1,080 mg) TbSR Take 1 tablet (10 mEq total) by mouth 3 (three) times daily with meals.  Qty: 90 tablet, Refills: 1      QUEtiapine (SEROQUEL) 25 MG Tab Take 25 mg by mouth once daily. 1 hour prior to bed  time      rosuvastatin (CRESTOR) 5 MG tablet Take 1 tablet (5 mg total) by mouth every evening.  Qty: 90 tablet, Refills: 3    Associated Diagnoses: Hypercholesterolemia      trospium (SANCTURA XR) 60 mg Cp24 capsule Take 60 mg by mouth every morning.       UBIDECARENONE (COENZYME Q10) 200 mg Tab Take 1 capsule by mouth once daily.       vortioxetine (TRINTELLIX) 10 mg Tab Take 10 mg by mouth once daily.      warfarin (COUMADIN) 5 MG tablet TAKE ONE TABLET BY MOUTH ONCE DAILY AT BEDTIME  Qty: 90 tablet, Refills: 5    Associated Diagnoses: Mitral valve disease; Permanent atrial fibrillation; Chronic anticoagulation             Indwelling Lines/Drains at time of discharge:   Lines/Drains/Airways          No matching active lines, drains, or airways          Time spent on the discharge of patient: 30 minutes  Patient was seen and examined on the date of discharge and determined to be suitable for discharge.         Dinah Phan MD  Department of Hospital Medicine  [facility]

## 2017-12-08 NOTE — PLAN OF CARE
Problem: Physical Therapy Goal  Goal: Physical Therapy Goal  Goals to be met by: 12/15/17  /  Patient will increase functional independence with mobility by performin. Supine to sit with Contact Guard Assistance  2. Sit to supine with Contact Guard Assistance  3. Sit to stand transfer with Contact Guard Assistance  4. Bed to chair transfer with Contact Guard Assistance using Rolling Walker  5. Gait  x 50 feet with Contact Guard Assistance using Rolling Walker.      Outcome: Unable to achieve outcome(s) by discharge  Pt is being dc and transferring to SNF.   Limited amb today 2* diarrhea.  Pt was given multiple medication over past few days 2* constipated/impacted.

## 2017-12-08 NOTE — NURSING
Patient in NAD, denies pain or SOB.   Report called to Aniya WINN at [facility].  IV removed from pt without complication.  Wife at bedside.  Pt to be transported via [facility] as per case management.  Will continue to monitor.

## 2017-12-08 NOTE — PLAN OF CARE
Problem: Occupational Therapy Goal  Goal: Occupational Therapy Goal  Goals to be met by: 12/15/2017  Patient will increase functional independence with ADLs by performing:    Feeding with Supervision.  UE Dressing with Contact Guard Assistance.  LE Dressing with Moderate Assistance.  Grooming while seated with Stand-by Assistance.  Toileting from bedside commode with Maximum Assistance for hygiene and clothing management.   Rolling to Bilateral with Stand-by Assistance.   Supine to sit with Stand-by Assistance.  Stand pivot transfers with Contact Guard Assistance.  Toilet transfer to bedside commode with Contact Guard Assistance.  Increased functional strength to WFL for BUE.  Upper extremity exercise program 10 reps per handout, with assistance as needed.     Outcome: Ongoing (interventions implemented as appropriate)  Pt  Required Total A for geoffrey-care after bowel movement, Total A for stand pivot transfers with assistance of 2 people and MAX A for feeding sitting up in bedside chair. Pt required verbal and tactile cues for drinking out of a straw with closed cup, Total A for eating from a spoon (for gumbo) and Min A for bite size and follow through eating bites of sandwich. Pt required increased time and verbal cues to stay awake and alert throughout session.  Pt would benefit from skilled occupational therapy intervention for increased activity tolerance and independence in ADL's.

## 2017-12-08 NOTE — PLAN OF CARE
12/08/17 1518   Final Note   Assessment Type Final Discharge Note   Discharge Disposition SNF   What phone number can be called within the next 1-3 days to see how you are doing after discharge? [phone]   Right Care Referral Info   Post Acute Recommendation SNF / Sub-Acute Rehab   Facility Name [facility] at [facility]

## 2017-12-09 NOTE — PT/OT/SLP PROGRESS
Physical Therapy Treatment  DC SUMMARY  G CODES      Patient Name:  Rikki Morrison   MRN:  [id number]    Recommendations:     Discharge Recommendations:  nursing facility, skilled   Discharge Equipment Recommendations:  (TBD by SNF)   Barriers to discharge: Decreased caregiver support(increased CG burden)    Assessment:     Rikki Morrison is a 77 y.o. male admitted with a medical diagnosis of Gait disturbance.  He presents with the following impairments/functional limitations:  weakness, impaired endurance, impaired functional mobilty, gait instability, impaired balance, impaired self care skills, impaired cognition, decreased safety awareness, impaired coordination .  PATIENT has not met goals.  Needs post acute to cont to address impairments and maximize functional mobility and decrease burden of care. TRANSFER TO ALTERNATE LEVEL OF CARE    Rehab Prognosis:  Fair; patient would benefit from acute skilled PT services to address these deficits and reach maximum level of function.      Recent Surgery: * No surgery found *      Plan:     During this hospitalization, patient to be seen 6 x/week to address the above listed problems via gait training, therapeutic activities, therapeutic exercises  · Plan of Care Expires:  01/04/18   Plan of Care Reviewed with: patient, spouse    Subjective     Communicated with nursing prior to session.  Patient found in dark room with all lights off, curtains drawn, wife asleep-lying in bed sleeping with liquid diarrhea down leg upon PT entry to room, agreeable to treatment.  All lights turned on  To arouse and wake pt up.  Explained to wife importance of maintaining day versus night time/lights and tv on during day.  If pt sleeps all day may be up all night .      Chief Complaint: sleepy per wife-has slept since breakfast  Patient comments/goals: wife states he will sleep at night even if he sleeps all day.  Waiting for patient to have BM.   Pain/Comfort:  · Pain Rating 1:  0/10  · Pain Rating Post-Intervention 1: 0/10    Patients cultural, spiritual, Synagogue conflicts given the current situation: none    Objective:     Patient found with: peripheral IV     General Precautions: Standard, fall, hearing impaired   Orthopedic Precautions:N/A   Braces: N/A     Functional Mobility:  Bed Mobility:    · Patient completed Rolling/Turning to Left with  moderate assistance  · Patient completed Rolling/Turning to Right with moderate assistance  · Patient completed Supine to Sit with moderate assistance      Transfers:  · Patient completed Sit <> Stand Transfer with moderate assistance and of 2 persons with rolling walker for safety and verbal and physical cues .  Only able to amb 5' as pt began to have more diarrhea after just getting cleaned up.  Pt to bs chair on blue pad.  LEFT WITH OT FOR FEEDING AS LUNCH TRAY HAD ARRIVED  GAIT  Pt stood with RW and mod A to get cleaned after having diarrhea in chair.  amb with RW for 30' in room  with mod a to edge of bed.  Assist with LE dressing and stood to get pull ups and pj bottoms pulled up.    AM-PAC 6 CLICK MOBILITY  Turning over in bed (including adjusting bedclothes, sheets and blankets)?: 2  Sitting down on and standing up from a chair with arms (e.g., wheelchair, bedside commode, etc.): 2  Moving from lying on back to sitting on the side of the bed?: 2  Moving to and from a bed to a chair (including a wheelchair)?: 2  Need to walk in hospital room?: 2  Climbing 3-5 steps with a railing?: 1  Total Score: 11       Therapeutic Activities and Exercises:   rolling in bed as well as standing with RW to get pt cleaned 2* diarrhea.  amb in room as above.  ,     Patient left HOB elevated with all lines intact, call button in reach, bed alarm on, nursing notified and wife present..    GOALS: NOT MET BY DC   Physical Therapy Goals        Problem: Physical Therapy Goal    Goal Priority Disciplines Outcome Goal Variances Interventions   Physical Therapy  Goal     PT/OT, PT Unable to achieve outcome(s) by discharge     Description:  Goals to be met by: 12/15/17  //  Patient will increase functional independence with mobility by performin. Supine to sit with Contact Guard Assistance  2. Sit to supine with Contact Guard Assistance  3. Sit to stand transfer with Contact Guard Assistance  4. Bed to chair transfer with Contact Guard Assistance using Rolling Walker  5. Gait  x 50 feet with Contact Guard Assistance using Rolling Walker.                       Time Tracking:     PT Received On: 17  PT Start Time: 1244/1524     PT Stop Time: 1315/533  PT Total Time (min): 38 min     Billable Minutes: Gait Training 8 and Therapeutic Activity 23    Treatment Type: Treatment  PT/PTA: PT           Kinjal Mejia, PT  2017

## 2017-12-09 NOTE — PT/OT/SLP DISCHARGE
Occupational Therapy Discharge Summary    Rikki Morrison  MRN: [id number]   Principal Problem: Gait disturbance      Patient Discharged from acute Occupational Therapy on 12/8/17.  Please refer to prior OT note dated 12/8/17 for functional status.    Assessment:      Patient appropriate for care in another setting.    Objective:     GOALS:    Occupational Therapy Goals        Problem: Occupational Therapy Goal    Goal Priority Disciplines Outcome Interventions   Occupational Therapy Goal     OT, PT/OT Ongoing (interventions implemented as appropriate)    Description:  Goals to be met by: 12/15/2017  Patient will increase functional independence with ADLs by performing:    Feeding with Supervision.  UE Dressing with Contact Guard Assistance.  LE Dressing with Moderate Assistance.  Grooming while seated with Stand-by Assistance.  Toileting from bedside commode with Maximum Assistance for hygiene and clothing management.   Rolling to Bilateral with Stand-by Assistance.   Supine to sit with Stand-by Assistance.  Stand pivot transfers with Contact Guard Assistance.  Toilet transfer to bedside commode with Contact Guard Assistance.  Increased functional strength to WFL for BUE.  Upper extremity exercise program 10 reps per handout, with assistance as needed.                      Reasons for Discontinuation of Therapy Services  Transfer to alternate level of care.      Plan:     Patient Discharged to: Skilled Nursing Facility    AZEEM Roth  12/9/2017

## 2018-01-01 ENCOUNTER — TELEPHONE (OUTPATIENT)
Dept: CARDIOLOGY | Facility: CLINIC | Age: 78
End: 2018-01-01

## 2018-01-01 ENCOUNTER — OFFICE VISIT (OUTPATIENT)
Dept: NEUROSURGERY | Facility: CLINIC | Age: 78
End: 2018-01-01
Payer: OTHER MISCELLANEOUS

## 2018-01-01 ENCOUNTER — OFFICE VISIT (OUTPATIENT)
Dept: CARDIOLOGY | Facility: CLINIC | Age: 78
End: 2018-01-01
Attending: INTERNAL MEDICINE
Payer: MEDICARE

## 2018-01-01 ENCOUNTER — OFFICE VISIT (OUTPATIENT)
Dept: INTERNAL MEDICINE | Facility: CLINIC | Age: 78
End: 2018-01-01
Attending: INTERNAL MEDICINE
Payer: MEDICARE

## 2018-01-01 ENCOUNTER — TELEPHONE (OUTPATIENT)
Dept: NEUROSURGERY | Facility: CLINIC | Age: 78
End: 2018-01-01

## 2018-01-01 ENCOUNTER — CLINICAL SUPPORT (OUTPATIENT)
Dept: CARDIOLOGY | Facility: CLINIC | Age: 78
End: 2018-01-01
Payer: MEDICARE

## 2018-01-01 ENCOUNTER — CLINICAL SUPPORT (OUTPATIENT)
Dept: INTERNAL MEDICINE | Facility: CLINIC | Age: 78
End: 2018-01-01
Payer: MEDICARE

## 2018-01-01 ENCOUNTER — HOSPITAL ENCOUNTER (INPATIENT)
Facility: HOSPITAL | Age: 78
LOS: 3 days | Discharge: HOSPICE | DRG: 085 | End: 2018-04-19
Attending: EMERGENCY MEDICINE | Admitting: PSYCHIATRY & NEUROLOGY
Payer: MEDICARE

## 2018-01-01 ENCOUNTER — HOSPITAL ENCOUNTER (OUTPATIENT)
Dept: RADIOLOGY | Facility: HOSPITAL | Age: 78
Discharge: HOME | End: 2018-05-03
Attending: PSYCHIATRY & NEUROLOGY
Payer: MEDICARE

## 2018-01-01 VITALS
HEART RATE: 86 BPM | DIASTOLIC BLOOD PRESSURE: 71 MMHG | WEIGHT: 162 LBS | SYSTOLIC BLOOD PRESSURE: 127 MMHG | TEMPERATURE: 99 F | BODY MASS INDEX: 21.97 KG/M2

## 2018-01-01 VITALS
HEIGHT: 71 IN | SYSTOLIC BLOOD PRESSURE: 98 MMHG | DIASTOLIC BLOOD PRESSURE: 60 MMHG | HEART RATE: 78 BPM | OXYGEN SATURATION: 97 %

## 2018-01-01 VITALS
WEIGHT: 163.81 LBS | DIASTOLIC BLOOD PRESSURE: 57 MMHG | SYSTOLIC BLOOD PRESSURE: 116 MMHG | TEMPERATURE: 99 F | OXYGEN SATURATION: 100 % | RESPIRATION RATE: 18 BRPM | HEART RATE: 62 BPM | HEIGHT: 72 IN | BODY MASS INDEX: 22.19 KG/M2

## 2018-01-01 VITALS
HEART RATE: 76 BPM | HEIGHT: 72 IN | DIASTOLIC BLOOD PRESSURE: 76 MMHG | SYSTOLIC BLOOD PRESSURE: 135 MMHG | WEIGHT: 155 LBS | BODY MASS INDEX: 20.99 KG/M2

## 2018-01-01 VITALS
HEART RATE: 81 BPM | HEIGHT: 71 IN | DIASTOLIC BLOOD PRESSURE: 67 MMHG | WEIGHT: 162 LBS | SYSTOLIC BLOOD PRESSURE: 119 MMHG | BODY MASS INDEX: 22.68 KG/M2

## 2018-01-01 DIAGNOSIS — E55.9 VITAMIN D DEFICIENCY: ICD-10-CM

## 2018-01-01 DIAGNOSIS — E11.9 TYPE 2 DIABETES MELLITUS WITHOUT COMPLICATION, WITHOUT LONG-TERM CURRENT USE OF INSULIN: ICD-10-CM

## 2018-01-01 DIAGNOSIS — G93.5 BRAIN COMPRESSION: ICD-10-CM

## 2018-01-01 DIAGNOSIS — F02.80 DEMENTIA DUE TO PARKINSON'S DISEASE WITHOUT BEHAVIORAL DISTURBANCE: ICD-10-CM

## 2018-01-01 DIAGNOSIS — Z95.2 H/O MITRAL VALVE REPLACEMENT WITH MECHANICAL VALVE: ICD-10-CM

## 2018-01-01 DIAGNOSIS — I62.00 SUBDURAL HEMORRHAGE: ICD-10-CM

## 2018-01-01 DIAGNOSIS — I10 ESSENTIAL HYPERTENSION: ICD-10-CM

## 2018-01-01 DIAGNOSIS — E78.00 HYPERCHOLESTEROLEMIA: ICD-10-CM

## 2018-01-01 DIAGNOSIS — Z23 FLU VACCINE NEED: Primary | ICD-10-CM

## 2018-01-01 DIAGNOSIS — G20.A1 DEMENTIA DUE TO PARKINSON'S DISEASE WITHOUT BEHAVIORAL DISTURBANCE: ICD-10-CM

## 2018-01-01 DIAGNOSIS — E78.00 HYPERCHOLESTEROLEMIA: Primary | ICD-10-CM

## 2018-01-01 DIAGNOSIS — R79.89 OTHER SPECIFIED ABNORMAL FINDINGS OF BLOOD CHEMISTRY: ICD-10-CM

## 2018-01-01 DIAGNOSIS — I05.9 MITRAL VALVE DISEASE: ICD-10-CM

## 2018-01-01 DIAGNOSIS — M10.9 ACUTE GOUT INVOLVING TOE OF RIGHT FOOT, UNSPECIFIED CAUSE: ICD-10-CM

## 2018-01-01 DIAGNOSIS — I48.21 PERMANENT ATRIAL FIBRILLATION: ICD-10-CM

## 2018-01-01 DIAGNOSIS — Z95.2 HISTORY OF MITRAL VALVE REPLACEMENT: ICD-10-CM

## 2018-01-01 DIAGNOSIS — N40.1 BENIGN NON-NODULAR PROSTATIC HYPERPLASIA WITH LOWER URINARY TRACT SYMPTOMS: ICD-10-CM

## 2018-01-01 DIAGNOSIS — Z79.01 CHRONIC ANTICOAGULATION: Primary | ICD-10-CM

## 2018-01-01 DIAGNOSIS — Z00.00 ROUTINE HISTORY AND PHYSICAL EXAMINATION OF ADULT: ICD-10-CM

## 2018-01-01 DIAGNOSIS — E03.9 HYPOTHYROIDISM, UNSPECIFIED TYPE: ICD-10-CM

## 2018-01-01 DIAGNOSIS — Z86.79 HISTORY OF ENDOCARDITIS: ICD-10-CM

## 2018-01-01 DIAGNOSIS — E78.9 DISORDER OF LIPID METABOLISM: ICD-10-CM

## 2018-01-01 DIAGNOSIS — I48.91 ATRIAL FIBRILLATION: ICD-10-CM

## 2018-01-01 DIAGNOSIS — S06.5XAA SUBDURAL HEMATOMA: ICD-10-CM

## 2018-01-01 DIAGNOSIS — S06.5XAA SDH (SUBDURAL HEMATOMA): Primary | ICD-10-CM

## 2018-01-01 DIAGNOSIS — Z79.01 CHRONIC ANTICOAGULATION: ICD-10-CM

## 2018-01-01 DIAGNOSIS — D50.8 OTHER IRON DEFICIENCY ANEMIA: ICD-10-CM
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
## 2018-01-01 DIAGNOSIS — S02.85XA CLOSED FRACTURE OF ORBITAL WALL, INITIAL ENCOUNTER: ICD-10-CM

## 2018-01-01 DIAGNOSIS — K21.9 GASTROESOPHAGEAL REFLUX DISEASE, ESOPHAGITIS PRESENCE NOT SPECIFIED: ICD-10-CM

## 2018-01-01 DIAGNOSIS — Z95.2 HISTORY OF MITRAL VALVE REPLACEMENT: Primary | ICD-10-CM

## 2018-01-01 DIAGNOSIS — S06.5XAA SDH (SUBDURAL HEMATOMA): ICD-10-CM

## 2018-01-01 DIAGNOSIS — I48.21 PERMANENT ATRIAL FIBRILLATION: Primary | ICD-10-CM

## 2018-01-01 DIAGNOSIS — I35.9 NONRHEUMATIC AORTIC VALVE DISORDER: ICD-10-CM

## 2018-01-01 DIAGNOSIS — D51.0 PERNICIOUS ANEMIA: ICD-10-CM

## 2018-01-01 DIAGNOSIS — Z12.5 SCREENING FOR PROSTATE CANCER: ICD-10-CM

## 2018-01-01 LAB
25(OH)D3+25(OH)D2 SERPL-MCNC: 38 NG/ML (ref 30–100)
ABO + RH BLD: NORMAL
ALBUMIN SERPL BCP-MCNC: 2.9 G/DL
ALBUMIN SERPL BCP-MCNC: 3.3 G/DL
ALBUMIN SERPL BCP-MCNC: 3.8 G/DL
ALBUMIN SERPL BCP-MCNC: 4 G/DL
ALBUMIN SERPL-MCNC: 4.2 G/DL (ref 3.6–5.1)
ALBUMIN/GLOB SERPL: 2 (CALC) (ref 1–2.5)
ALP SERPL-CCNC: 71 U/L
ALP SERPL-CCNC: 79 U/L
ALP SERPL-CCNC: 92 U/L (ref 40–115)
ALP SERPL-CCNC: 93 U/L
ALP SERPL-CCNC: 95 U/L
ALT SERPL W/O P-5'-P-CCNC: 10 U/L
ALT SERPL W/O P-5'-P-CCNC: 13 U/L
ALT SERPL W/O P-5'-P-CCNC: 9 U/L
ALT SERPL W/O P-5'-P-CCNC: 9 U/L
ALT SERPL-CCNC: 10 U/L (ref 9–46)
ANION GAP SERPL CALC-SCNC: 7 MMOL/L
ANION GAP SERPL CALC-SCNC: 8 MMOL/L
ANION GAP SERPL CALC-SCNC: 8 MMOL/L
ANION GAP SERPL CALC-SCNC: 9 MMOL/L
APTT BLDCRRT: 24 SEC
APTT BLDCRRT: 28.8 SEC
APTT BLDCRRT: 30.4 SEC
AST SERPL-CCNC: 12 U/L
AST SERPL-CCNC: 13 U/L (ref 10–35)
AST SERPL-CCNC: 14 U/L
AST SERPL-CCNC: 18 U/L
AST SERPL-CCNC: 21 U/L
BASOPHILS # BLD AUTO: 0.02 K/UL
BASOPHILS # BLD AUTO: 0.04 K/UL
BASOPHILS # BLD AUTO: 0.06 K/UL
BASOPHILS # BLD AUTO: 0.06 K/UL
BASOPHILS # BLD AUTO: 65 CELLS/UL (ref 0–200)
BASOPHILS NFR BLD AUTO: 0.6 %
BASOPHILS NFR BLD: 0.1 %
BASOPHILS NFR BLD: 0.3 %
BASOPHILS NFR BLD: 0.5 %
BASOPHILS NFR BLD: 0.7 %
BILIRUB SERPL-MCNC: 0.6 MG/DL
BILIRUB SERPL-MCNC: 0.6 MG/DL
BILIRUB SERPL-MCNC: 0.7 MG/DL (ref 0.2–1.2)
BILIRUB SERPL-MCNC: 0.8 MG/DL
BILIRUB SERPL-MCNC: 1.4 MG/DL
BLD GP AB SCN CELLS X3 SERPL QL: NORMAL
BUN SERPL-MCNC: 10 MG/DL
BUN SERPL-MCNC: 10 MG/DL
BUN SERPL-MCNC: 11 MG/DL
BUN SERPL-MCNC: 11 MG/DL
BUN SERPL-MCNC: 11 MG/DL (ref 7–25)
BUN/CREAT SERPL: ABNORMAL (CALC) (ref 6–22)
CALCIUM SERPL-MCNC: 10.6 MG/DL (ref 8.6–10.3)
CALCIUM SERPL-MCNC: 10.8 MG/DL
CALCIUM SERPL-MCNC: 11 MG/DL
CALCIUM SERPL-MCNC: 9.1 MG/DL
CALCIUM SERPL-MCNC: 9.6 MG/DL
CHLORIDE SERPL-SCNC: 105 MMOL/L (ref 98–110)
CHLORIDE SERPL-SCNC: 106 MMOL/L
CHLORIDE SERPL-SCNC: 107 MMOL/L
CHLORIDE SERPL-SCNC: 111 MMOL/L
CHLORIDE SERPL-SCNC: 113 MMOL/L
CHOLEST SERPL-MCNC: 136 MG/DL
CHOLEST SERPL-MCNC: 145 MG/DL
CHOLEST/HDLC SERPL: 4.4 {RATIO}
CHOLEST/HDLC SERPL: 5.2 (CALC)
CK SERPL-CCNC: 24 U/L (ref 44–196)
CO2 SERPL-SCNC: 23 MMOL/L
CO2 SERPL-SCNC: 24 MMOL/L
CO2 SERPL-SCNC: 25 MMOL/L
CO2 SERPL-SCNC: 26 MMOL/L
CO2 SERPL-SCNC: 29 MMOL/L (ref 20–31)
CREAT SERPL-MCNC: 0.8 MG/DL
CREAT SERPL-MCNC: 0.8 MG/DL
CREAT SERPL-MCNC: 0.82 MG/DL (ref 0.7–1.18)
CREAT SERPL-MCNC: 0.9 MG/DL
CREAT SERPL-MCNC: 1 MG/DL
DIFFERENTIAL METHOD: ABNORMAL
EOSINOPHIL # BLD AUTO: 0 K/UL
EOSINOPHIL # BLD AUTO: 0.6 K/UL
EOSINOPHIL # BLD AUTO: 0.6 K/UL
EOSINOPHIL # BLD AUTO: 0.7 K/UL
EOSINOPHIL # BLD AUTO: 410 CELLS/UL (ref 15–500)
EOSINOPHIL NFR BLD AUTO: 3.8 %
EOSINOPHIL NFR BLD: 0.2 %
EOSINOPHIL NFR BLD: 4.2 %
EOSINOPHIL NFR BLD: 5.7 %
EOSINOPHIL NFR BLD: 7 %
ERYTHROCYTE [DISTWIDTH] IN BLOOD BY AUTOMATED COUNT: 15.2 % (ref 11–15)
ERYTHROCYTE [DISTWIDTH] IN BLOOD BY AUTOMATED COUNT: 16.2 %
ERYTHROCYTE [DISTWIDTH] IN BLOOD BY AUTOMATED COUNT: 16.3 %
ERYTHROCYTE [DISTWIDTH] IN BLOOD BY AUTOMATED COUNT: 16.5 %
ERYTHROCYTE [DISTWIDTH] IN BLOOD BY AUTOMATED COUNT: 16.5 %
ERYTHROCYTE [DISTWIDTH] IN BLOOD BY AUTOMATED COUNT: 17 %
EST. GFR  (AFRICAN AMERICAN): >60 ML/MIN/1.73 M^2
EST. GFR  (NON AFRICAN AMERICAN): >60 ML/MIN/1.73 M^2
ESTIMATED AVG GLUCOSE: 114 MG/DL
ESTIMATED PA SYSTOLIC PRESSURE: 34.14
GFR SERPL CREATININE-BSD FRML MDRD: 85 ML/MIN/1.73M2
GLOBULIN SER CALC-MCNC: 2.1 G/DL (CALC) (ref 1.9–3.7)
GLUCOSE SERPL-MCNC: 106 MG/DL (ref 65–99)
GLUCOSE SERPL-MCNC: 125 MG/DL
GLUCOSE SERPL-MCNC: 134 MG/DL
GLUCOSE SERPL-MCNC: 159 MG/DL
GLUCOSE SERPL-MCNC: 160 MG/DL
HBA1C MFR BLD HPLC: 5.6 %
HBA1C MFR BLD: 5.8 % OF TOTAL HGB
HCT VFR BLD AUTO: 34.6 %
HCT VFR BLD AUTO: 35 %
HCT VFR BLD AUTO: 42.8 %
HCT VFR BLD AUTO: 44 % (ref 38.5–50)
HCT VFR BLD AUTO: 44.3 %
HCT VFR BLD AUTO: 45.4 %
HDLC SERPL-MCNC: 28 MG/DL
HDLC SERPL-MCNC: 31 MG/DL
HDLC SERPL: 22.8 %
HGB BLD-MCNC: 10.9 G/DL
HGB BLD-MCNC: 11.2 G/DL
HGB BLD-MCNC: 13.8 G/DL
HGB BLD-MCNC: 14 G/DL
HGB BLD-MCNC: 14.3 G/DL (ref 13.2–17.1)
HGB BLD-MCNC: 14.9 G/DL
IMM GRANULOCYTES # BLD AUTO: 0.01 K/UL
IMM GRANULOCYTES # BLD AUTO: 0.04 K/UL
IMM GRANULOCYTES # BLD AUTO: 0.05 K/UL
IMM GRANULOCYTES NFR BLD AUTO: 0.1 %
IMM GRANULOCYTES NFR BLD AUTO: 0.3 %
IMM GRANULOCYTES NFR BLD AUTO: 0.4 %
INR PPP: 1
INR PPP: 1.4 (ref 2–3)
INR PPP: 1.8
INR PPP: 1.8
INR PPP: 2.2 (ref 2–3)
INR PPP: 2.5
INR PPP: 3.9
IRON SATN MFR SERPL: 28 % (CALC) (ref 15–60)
IRON SERPL-MCNC: 71 MCG/DL (ref 50–180)
LDLC SERPL CALC-MCNC: 78.6 MG/DL
LDLC SERPL CALC-MCNC: 84 MG/DL (CALC)
LYMPHOCYTES # BLD AUTO: 1.5 K/UL
LYMPHOCYTES # BLD AUTO: 2.7 K/UL
LYMPHOCYTES # BLD AUTO: 3 K/UL
LYMPHOCYTES # BLD AUTO: 3359 CELLS/UL (ref 850–3900)
LYMPHOCYTES # BLD AUTO: 4.6 K/UL
LYMPHOCYTES NFR BLD AUTO: 31.1 %
LYMPHOCYTES NFR BLD: 11.2 %
LYMPHOCYTES NFR BLD: 23.7 %
LYMPHOCYTES NFR BLD: 33 %
LYMPHOCYTES NFR BLD: 34.4 %
MAGNESIUM SERPL-MCNC: 2 MG/DL
MAGNESIUM SERPL-MCNC: 2.4 MG/DL
MCH RBC QN AUTO: 28.5 PG
MCH RBC QN AUTO: 28.6 PG (ref 27–33)
MCH RBC QN AUTO: 29.1 PG
MCH RBC QN AUTO: 29.2 PG
MCHC RBC AUTO-ENTMCNC: 31.5 G/DL
MCHC RBC AUTO-ENTMCNC: 31.6 G/DL
MCHC RBC AUTO-ENTMCNC: 32 G/DL
MCHC RBC AUTO-ENTMCNC: 32.2 G/DL
MCHC RBC AUTO-ENTMCNC: 32.5 G/DL (ref 32–36)
MCHC RBC AUTO-ENTMCNC: 32.8 G/DL
MCV RBC AUTO: 88 FL (ref 80–100)
MCV RBC AUTO: 89 FL
MCV RBC AUTO: 90 FL
MCV RBC AUTO: 91 FL
MCV RBC AUTO: 91 FL
MCV RBC AUTO: 92 FL
MONOCYTES # BLD AUTO: 0.7 K/UL
MONOCYTES # BLD AUTO: 0.9 K/UL
MONOCYTES # BLD AUTO: 1 K/UL
MONOCYTES # BLD AUTO: 1.3 K/UL
MONOCYTES # BLD AUTO: 940 CELLS/UL (ref 200–950)
MONOCYTES NFR BLD AUTO: 8.7 %
MONOCYTES NFR BLD: 6.6 %
MONOCYTES NFR BLD: 7.8 %
MONOCYTES NFR BLD: 9 %
MONOCYTES NFR BLD: 9.9 %
NEUTROPHILS # BLD AUTO: 11.2 K/UL
NEUTROPHILS # BLD AUTO: 4.1 K/UL
NEUTROPHILS # BLD AUTO: 6026 CELLS/UL (ref 1500–7800)
NEUTROPHILS # BLD AUTO: 7 K/UL
NEUTROPHILS # BLD AUTO: 7.5 K/UL
NEUTROPHILS NFR BLD AUTO: 55.8 %
NEUTROPHILS NFR BLD: 50.2 %
NEUTROPHILS NFR BLD: 53 %
NEUTROPHILS NFR BLD: 59.9 %
NEUTROPHILS NFR BLD: 81.5 %
NONHDLC SERPL-MCNC: 105 MG/DL
NONHDLC SERPL-MCNC: 117 MG/DL (CALC)
NRBC BLD-RTO: 0 /100 WBC
PHOSPHATE SERPL-MCNC: 2.8 MG/DL
PHOSPHATE SERPL-MCNC: 2.8 MG/DL
PLATELET # BLD AUTO: 181 K/UL
PLATELET # BLD AUTO: 194 K/UL
PLATELET # BLD AUTO: 233 K/UL
PLATELET # BLD AUTO: 247 THOUSAND/UL (ref 140–400)
PLATELET # BLD AUTO: 276 K/UL
PLATELET # BLD AUTO: 280 K/UL
PMV BLD AUTO: 10 FL
PMV BLD AUTO: 10.1 FL
PMV BLD AUTO: 10.6 FL
PMV BLD AUTO: 10.6 FL
PMV BLD AUTO: 10.7 FL
PMV BLD REES-ECKER: 10.4 FL (ref 7.5–12.5)
POCT GLUCOSE: 102 MG/DL (ref 70–110)
POCT GLUCOSE: 110 MG/DL (ref 70–110)
POCT GLUCOSE: 115 MG/DL (ref 70–110)
POCT GLUCOSE: 122 MG/DL (ref 70–110)
POCT GLUCOSE: 124 MG/DL (ref 70–110)
POCT GLUCOSE: 133 MG/DL (ref 70–110)
POCT GLUCOSE: 135 MG/DL (ref 70–110)
POCT GLUCOSE: 137 MG/DL (ref 70–110)
POCT GLUCOSE: 144 MG/DL (ref 70–110)
POCT GLUCOSE: 146 MG/DL (ref 70–110)
POCT GLUCOSE: 87 MG/DL (ref 70–110)
POTASSIUM SERPL-SCNC: 3.6 MMOL/L
POTASSIUM SERPL-SCNC: 3.8 MMOL/L
POTASSIUM SERPL-SCNC: 4.2 MMOL/L
POTASSIUM SERPL-SCNC: 4.3 MMOL/L (ref 3.5–5.3)
POTASSIUM SERPL-SCNC: 4.8 MMOL/L
PROT SERPL-MCNC: 5 G/DL
PROT SERPL-MCNC: 5.6 G/DL
PROT SERPL-MCNC: 6.3 G/DL (ref 6.1–8.1)
PROT SERPL-MCNC: 6.5 G/DL
PROT SERPL-MCNC: 6.7 G/DL
PROTHROMBIN TIME: 10.5 SEC
PROTHROMBIN TIME: 10.7 SEC
PROTHROMBIN TIME: 10.9 SEC
PROTHROMBIN TIME: 17.5 SEC
PROTHROMBIN TIME: 19 SEC (ref 9–11.5)
PROTHROMBIN TIME: 23.9 SEC
PROTHROMBIN TIME: 41 SEC
RBC # BLD AUTO: 3.75 M/UL
RBC # BLD AUTO: 3.84 M/UL
RBC # BLD AUTO: 4.73 M/UL
RBC # BLD AUTO: 4.91 M/UL
RBC # BLD AUTO: 5 MILLION/UL (ref 4.2–5.8)
RBC # BLD AUTO: 5.11 M/UL
RETIRED EF AND QEF - SEE NOTES: 65 (ref 55–65)
SODIUM SERPL-SCNC: 140 MMOL/L
SODIUM SERPL-SCNC: 140 MMOL/L (ref 135–146)
SODIUM SERPL-SCNC: 141 MMOL/L
SODIUM SERPL-SCNC: 142 MMOL/L
SODIUM SERPL-SCNC: 144 MMOL/L
TIBC SERPL-MCNC: 252 MCG/DL (CALC) (ref 250–425)
TRICUSPID VALVE REGURGITATION: NORMAL
TRIGL SERPL-MCNC: 132 MG/DL
TRIGL SERPL-MCNC: 239 MG/DL
TSH SERPL DL<=0.005 MIU/L-ACNC: 0.97 UIU/ML
TSH SERPL-ACNC: 1.67 MIU/L (ref 0.4–4.5)
URATE SERPL-MCNC: 6.2 MG/DL (ref 4–8)
VIT B12 SERPL-MCNC: 883 PG/ML (ref 200–1100)
VITAMIN D2 SERPL-MCNC: <4 NG/ML
VITAMIN D3 SERPL-MCNC: 38 NG/ML
WBC # BLD AUTO: 10.8 THOUSAND/UL (ref 3.8–10.8)
WBC # BLD AUTO: 12.57 K/UL
WBC # BLD AUTO: 13.23 K/UL
WBC # BLD AUTO: 13.69 K/UL
WBC # BLD AUTO: 14.65 K/UL
WBC # BLD AUTO: 8.23 K/UL

## 2018-01-01 PROCEDURE — 85025 COMPLETE CBC W/AUTO DIFF WBC: CPT

## 2018-01-01 PROCEDURE — 85610 PROTHROMBIN TIME: CPT

## 2018-01-01 PROCEDURE — 93010 ELECTROCARDIOGRAM REPORT: CPT | Mod: GW,HPC,, | Performed by: INTERNAL MEDICINE

## 2018-01-01 PROCEDURE — 99232 SBSQ HOSP IP/OBS MODERATE 35: CPT | Mod: GW,HPC,, | Performed by: NEUROLOGICAL SURGERY

## 2018-01-01 PROCEDURE — 3066F NEPHROPATHY DOC TX: CPT | Mod: S$GLB,,, | Performed by: INTERNAL MEDICINE

## 2018-01-01 PROCEDURE — 92522 EVALUATE SPEECH PRODUCTION: CPT

## 2018-01-01 PROCEDURE — A4216 STERILE WATER/SALINE, 10 ML: HCPCS | Performed by: PSYCHIATRY & NEUROLOGY

## 2018-01-01 PROCEDURE — 20000000 HC ICU ROOM

## 2018-01-01 PROCEDURE — 25000003 PHARM REV CODE 250: Performed by: PSYCHIATRY & NEUROLOGY

## 2018-01-01 PROCEDURE — 25000003 PHARM REV CODE 250: Performed by: EMERGENCY MEDICINE

## 2018-01-01 PROCEDURE — 97162 PT EVAL MOD COMPLEX 30 MIN: CPT

## 2018-01-01 PROCEDURE — 97802 MEDICAL NUTRITION INDIV IN: CPT

## 2018-01-01 PROCEDURE — 93306 TTE W/DOPPLER COMPLETE: CPT

## 2018-01-01 PROCEDURE — 25000003 PHARM REV CODE 250: Performed by: NURSE PRACTITIONER

## 2018-01-01 PROCEDURE — 96366 THER/PROPH/DIAG IV INF ADDON: CPT

## 2018-01-01 PROCEDURE — 96375 TX/PRO/DX INJ NEW DRUG ADDON: CPT

## 2018-01-01 PROCEDURE — 12000002 HC ACUTE/MED SURGE SEMI-PRIVATE ROOM

## 2018-01-01 PROCEDURE — 99214 OFFICE O/P EST MOD 30 MIN: CPT | Mod: GW,S$GLB,, | Performed by: PHYSICIAN ASSISTANT

## 2018-01-01 PROCEDURE — 85027 COMPLETE CBC AUTOMATED: CPT

## 2018-01-01 PROCEDURE — 96376 TX/PRO/DX INJ SAME DRUG ADON: CPT

## 2018-01-01 PROCEDURE — 96365 THER/PROPH/DIAG IV INF INIT: CPT

## 2018-01-01 PROCEDURE — 25000003 PHARM REV CODE 250: Performed by: PHYSICIAN ASSISTANT

## 2018-01-01 PROCEDURE — 63600175 PHARM REV CODE 636 W HCPCS: Performed by: NURSE PRACTITIONER

## 2018-01-01 PROCEDURE — 93306 TTE W/DOPPLER COMPLETE: CPT | Mod: 26,GW,HPC, | Performed by: INTERNAL MEDICINE

## 2018-01-01 PROCEDURE — 25500020 PHARM REV CODE 255: Performed by: EMERGENCY MEDICINE

## 2018-01-01 PROCEDURE — 3078F DIAST BP <80 MM HG: CPT | Mod: CPTII,S$GLB,, | Performed by: INTERNAL MEDICINE

## 2018-01-01 PROCEDURE — 86850 RBC ANTIBODY SCREEN: CPT

## 2018-01-01 PROCEDURE — 82962 GLUCOSE BLOOD TEST: CPT

## 2018-01-01 PROCEDURE — 27000221 HC OXYGEN, UP TO 24 HOURS

## 2018-01-01 PROCEDURE — 92523 SPEECH SOUND LANG COMPREHEN: CPT

## 2018-01-01 PROCEDURE — 99238 HOSP IP/OBS DSCHRG MGMT 30/<: CPT | Mod: GW,GC,, | Performed by: PSYCHIATRY & NEUROLOGY

## 2018-01-01 PROCEDURE — 93000 ELECTROCARDIOGRAM COMPLETE: CPT | Mod: GW,S$GLB,, | Performed by: INTERNAL MEDICINE

## 2018-01-01 PROCEDURE — 84100 ASSAY OF PHOSPHORUS: CPT

## 2018-01-01 PROCEDURE — 63600175 PHARM REV CODE 636 W HCPCS: Performed by: EMERGENCY MEDICINE

## 2018-01-01 PROCEDURE — 80053 COMPREHEN METABOLIC PANEL: CPT

## 2018-01-01 PROCEDURE — 85730 THROMBOPLASTIN TIME PARTIAL: CPT

## 2018-01-01 PROCEDURE — 83735 ASSAY OF MAGNESIUM: CPT

## 2018-01-01 PROCEDURE — 1160F RVW MEDS BY RX/DR IN RCRD: CPT | Mod: S$GLB,,, | Performed by: INTERNAL MEDICINE

## 2018-01-01 PROCEDURE — 3075F SYST BP GE 130 - 139MM HG: CPT | Mod: CPTII,S$GLB,, | Performed by: INTERNAL MEDICINE

## 2018-01-01 PROCEDURE — 3074F SYST BP LT 130 MM HG: CPT | Mod: CPTII,S$GLB,, | Performed by: INTERNAL MEDICINE

## 2018-01-01 PROCEDURE — 1159F MED LIST DOCD IN RCRD: CPT | Mod: S$GLB,,, | Performed by: INTERNAL MEDICINE

## 2018-01-01 PROCEDURE — 99214 OFFICE O/P EST MOD 30 MIN: CPT | Mod: 25,GW,S$GLB, | Performed by: INTERNAL MEDICINE

## 2018-01-01 PROCEDURE — 94761 N-INVAS EAR/PLS OXIMETRY MLT: CPT

## 2018-01-01 PROCEDURE — 63600531 PHARM REV CODE 636 NO ALT 250 W HCPCS: Mod: JG | Performed by: PSYCHIATRY & NEUROLOGY

## 2018-01-01 PROCEDURE — 63600175 PHARM REV CODE 636 W HCPCS: Performed by: PSYCHIATRY & NEUROLOGY

## 2018-01-01 PROCEDURE — 99214 OFFICE O/P EST MOD 30 MIN: CPT | Mod: GW,S$GLB,, | Performed by: INTERNAL MEDICINE

## 2018-01-01 PROCEDURE — 84443 ASSAY THYROID STIM HORMONE: CPT

## 2018-01-01 PROCEDURE — 85730 THROMBOPLASTIN TIME PARTIAL: CPT | Mod: 91

## 2018-01-01 PROCEDURE — G0008 ADMIN INFLUENZA VIRUS VAC: HCPCS | Mod: GW,HPC,S$GLB, | Performed by: INTERNAL MEDICINE

## 2018-01-01 PROCEDURE — 70450 CT HEAD/BRAIN W/O DYE: CPT | Mod: TC

## 2018-01-01 PROCEDURE — 85610 PROTHROMBIN TIME: CPT | Mod: 91

## 2018-01-01 PROCEDURE — C9132 KCENTRA, PER I.U.: HCPCS | Mod: JG | Performed by: PSYCHIATRY & NEUROLOGY

## 2018-01-01 PROCEDURE — 99233 SBSQ HOSP IP/OBS HIGH 50: CPT | Mod: GW,GC,, | Performed by: PSYCHIATRY & NEUROLOGY

## 2018-01-01 PROCEDURE — 97165 OT EVAL LOW COMPLEX 30 MIN: CPT

## 2018-01-01 PROCEDURE — 70450 CT HEAD/BRAIN W/O DYE: CPT | Mod: 26,,, | Performed by: STUDENT IN AN ORGANIZED HEALTH CARE EDUCATION/TRAINING PROGRAM

## 2018-01-01 PROCEDURE — G0442 ANNUAL ALCOHOL SCREEN 15 MIN: HCPCS | Mod: GW,59,S$GLB, | Performed by: INTERNAL MEDICINE

## 2018-01-01 PROCEDURE — 99222 1ST HOSP IP/OBS MODERATE 55: CPT | Mod: GW,,, | Performed by: NURSE PRACTITIONER

## 2018-01-01 PROCEDURE — 80061 LIPID PANEL: CPT

## 2018-01-01 PROCEDURE — 85610 PROTHROMBIN TIME: CPT | Mod: QW,GW,, | Performed by: INTERNAL MEDICINE

## 2018-01-01 PROCEDURE — 99222 1ST HOSP IP/OBS MODERATE 55: CPT | Mod: GW,HPC,ICN, | Performed by: NEUROLOGICAL SURGERY

## 2018-01-01 PROCEDURE — G0444 DEPRESSION SCREEN ANNUAL: HCPCS | Mod: GW,59,S$GLB, | Performed by: INTERNAL MEDICINE

## 2018-01-01 PROCEDURE — 3008F BODY MASS INDEX DOCD: CPT | Mod: S$GLB,,, | Performed by: INTERNAL MEDICINE

## 2018-01-01 PROCEDURE — 99999 PR PBB SHADOW E&M-EST. PATIENT-LVL IV: CPT | Mod: PBBFAC,,, | Performed by: PHYSICIAN ASSISTANT

## 2018-01-01 PROCEDURE — 83036 HEMOGLOBIN GLYCOSYLATED A1C: CPT

## 2018-01-01 PROCEDURE — 90662 IIV NO PRSV INCREASED AG IM: CPT | Mod: GW,HPC,S$GLB, | Performed by: INTERNAL MEDICINE

## 2018-01-01 PROCEDURE — 99285 EMERGENCY DEPT VISIT HI MDM: CPT | Mod: 25

## 2018-01-01 PROCEDURE — 0HQ1XZZ REPAIR FACE SKIN, EXTERNAL APPROACH: ICD-10-PCS | Performed by: EMERGENCY MEDICINE

## 2018-01-01 PROCEDURE — 85610 PROTHROMBIN TIME: CPT | Mod: QW,GW,HPC, | Performed by: INTERNAL MEDICINE

## 2018-01-01 PROCEDURE — 3288F FALL RISK ASSESSMENT DOCD: CPT | Mod: S$GLB,,, | Performed by: INTERNAL MEDICINE

## 2018-01-01 RX ORDER — OMEPRAZOLE 20 MG/1
CAPSULE, DELAYED RELEASE ORAL
Qty: 180 CAPSULE | Refills: 3 | Status: SHIPPED | OUTPATIENT
Start: 2018-01-01

## 2018-01-01 RX ORDER — LIDOCAINE HYDROCHLORIDE 10 MG/ML
5 INJECTION INFILTRATION; PERINEURAL
Status: COMPLETED | OUTPATIENT
Start: 2018-01-01 | End: 2018-01-01

## 2018-01-01 RX ORDER — GALANTAMINE 4 MG/1
4 TABLET, FILM COATED ORAL 2 TIMES DAILY
Status: DISCONTINUED | OUTPATIENT
Start: 2018-01-01 | End: 2018-01-01 | Stop reason: HOSPADM

## 2018-01-01 RX ORDER — LORAZEPAM 2 MG/ML
1 INJECTION INTRAMUSCULAR
Status: COMPLETED | OUTPATIENT
Start: 2018-01-01 | End: 2018-01-01

## 2018-01-01 RX ORDER — INSULIN ASPART 100 [IU]/ML
1-10 INJECTION, SOLUTION INTRAVENOUS; SUBCUTANEOUS EVERY 6 HOURS PRN
Status: DISCONTINUED | OUTPATIENT
Start: 2018-01-01 | End: 2018-01-01 | Stop reason: HOSPADM

## 2018-01-01 RX ORDER — SODIUM,POTASSIUM PHOSPHATES 280-250MG
2 POWDER IN PACKET (EA) ORAL
Status: DISCONTINUED | OUTPATIENT
Start: 2018-01-01 | End: 2018-01-01 | Stop reason: HOSPADM

## 2018-01-01 RX ORDER — LEVETIRACETAM 5 MG/ML
500 INJECTION INTRAVASCULAR EVERY 12 HOURS
Status: DISCONTINUED | OUTPATIENT
Start: 2018-01-01 | End: 2018-01-01

## 2018-01-01 RX ORDER — ASPIRIN 81 MG/1
81 TABLET ORAL DAILY
Qty: 90 TABLET | Refills: 3 | COMMUNITY
Start: 2018-01-01 | End: 2019-07-05

## 2018-01-01 RX ORDER — LEVETIRACETAM 500 MG/1
500 TABLET ORAL 2 TIMES DAILY
Qty: 60 TABLET | Refills: 11 | Status: SHIPPED | OUTPATIENT
Start: 2018-01-01 | End: 2019-04-18

## 2018-01-01 RX ORDER — POTASSIUM CHLORIDE 20 MEQ/15ML
40 SOLUTION ORAL
Status: DISCONTINUED | OUTPATIENT
Start: 2018-01-01 | End: 2018-01-01 | Stop reason: HOSPADM

## 2018-01-01 RX ORDER — GALANTAMINE 8 MG/1
TABLET, FILM COATED ORAL
COMMUNITY
Start: 2018-01-29

## 2018-01-01 RX ORDER — LABETALOL HYDROCHLORIDE 5 MG/ML
10 INJECTION, SOLUTION INTRAVENOUS EVERY 4 HOURS PRN
Status: DISCONTINUED | OUTPATIENT
Start: 2018-01-01 | End: 2018-01-01 | Stop reason: HOSPADM

## 2018-01-01 RX ORDER — ROSUVASTATIN CALCIUM 5 MG/1
5 TABLET, COATED ORAL NIGHTLY
Qty: 90 TABLET | Refills: 3 | Status: SHIPPED | OUTPATIENT
Start: 2018-01-01

## 2018-01-01 RX ORDER — WARFARIN SODIUM 5 MG/1
TABLET ORAL
Qty: 120 TABLET | Refills: 3 | Status: SHIPPED | OUTPATIENT
Start: 2018-01-01

## 2018-01-01 RX ORDER — SODIUM CHLORIDE 9 MG/ML
INJECTION, SOLUTION INTRAVENOUS CONTINUOUS
Status: DISCONTINUED | OUTPATIENT
Start: 2018-01-01 | End: 2018-01-01 | Stop reason: HOSPADM

## 2018-01-01 RX ORDER — AMLODIPINE BESYLATE 2.5 MG/1
2.5 TABLET ORAL DAILY
Qty: 90 TABLET | Refills: 3 | Status: SHIPPED | OUTPATIENT
Start: 2018-01-01

## 2018-01-01 RX ORDER — GLUCAGON 1 MG
1 KIT INJECTION
Status: DISCONTINUED | OUTPATIENT
Start: 2018-01-01 | End: 2018-01-01 | Stop reason: HOSPADM

## 2018-01-01 RX ORDER — SODIUM CHLORIDE 0.9 % (FLUSH) 0.9 %
3 SYRINGE (ML) INJECTION EVERY 8 HOURS
Status: DISCONTINUED | OUTPATIENT
Start: 2018-01-01 | End: 2018-01-01 | Stop reason: HOSPADM

## 2018-01-01 RX ORDER — MORPHINE SULFATE 20 MG/ML
SOLUTION ORAL
Refills: 0 | COMMUNITY
Start: 2018-01-29

## 2018-01-01 RX ORDER — LANOLIN ALCOHOL/MO/W.PET/CERES
800 CREAM (GRAM) TOPICAL
Status: DISCONTINUED | OUTPATIENT
Start: 2018-01-01 | End: 2018-01-01 | Stop reason: HOSPADM

## 2018-01-01 RX ORDER — METOPROLOL SUCCINATE 50 MG/1
50 TABLET, EXTENDED RELEASE ORAL DAILY
Qty: 90 TABLET | Refills: 3 | Status: SHIPPED | OUTPATIENT
Start: 2018-01-01

## 2018-01-01 RX ORDER — ONDANSETRON 2 MG/ML
4 INJECTION INTRAMUSCULAR; INTRAVENOUS
Status: COMPLETED | OUTPATIENT
Start: 2018-01-01 | End: 2018-01-01

## 2018-01-01 RX ORDER — MIDAZOLAM HYDROCHLORIDE 1 MG/ML
INJECTION INTRAMUSCULAR; INTRAVENOUS
Status: DISCONTINUED
Start: 2018-01-01 | End: 2018-01-01 | Stop reason: HOSPADM

## 2018-01-01 RX ORDER — HYDROCODONE BITARTRATE AND ACETAMINOPHEN 500; 5 MG/1; MG/1
TABLET ORAL
Status: DISCONTINUED | OUTPATIENT
Start: 2018-01-01 | End: 2018-01-01 | Stop reason: HOSPADM

## 2018-01-01 RX ORDER — ONDANSETRON 8 MG/1
8 TABLET, ORALLY DISINTEGRATING ORAL EVERY 8 HOURS PRN
Status: DISCONTINUED | OUTPATIENT
Start: 2018-01-01 | End: 2018-01-01 | Stop reason: HOSPADM

## 2018-01-01 RX ORDER — WARFARIN 1 MG/1
TABLET ORAL
Qty: 180 TABLET | Refills: 1 | Status: SHIPPED | OUTPATIENT
Start: 2018-01-01

## 2018-01-01 RX ORDER — HYOSCYAMINE SULFATE 0.12 MG/1
TABLET SUBLINGUAL
Refills: 0 | COMMUNITY
Start: 2018-01-29

## 2018-01-01 RX ORDER — LEVETIRACETAM 500 MG/1
500 TABLET ORAL 2 TIMES DAILY
Status: DISCONTINUED | OUTPATIENT
Start: 2018-01-01 | End: 2018-01-01 | Stop reason: HOSPADM

## 2018-01-01 RX ORDER — CETIRIZINE HYDROCHLORIDE, PSEUDOEPHEDRINE HYDROCHLORIDE 5; 120 MG/1; MG/1
TABLET, FILM COATED, EXTENDED RELEASE ORAL
Refills: 0 | COMMUNITY
Start: 2018-01-29

## 2018-01-01 RX ORDER — MIDAZOLAM HYDROCHLORIDE 1 MG/ML
1 INJECTION INTRAMUSCULAR; INTRAVENOUS ONCE
Status: COMPLETED | OUTPATIENT
Start: 2018-01-01 | End: 2018-01-01

## 2018-01-01 RX ORDER — AMLODIPINE BESYLATE 2.5 MG/1
2.5 TABLET ORAL DAILY
Status: DISCONTINUED | OUTPATIENT
Start: 2018-01-01 | End: 2018-01-01 | Stop reason: HOSPADM

## 2018-01-01 RX ORDER — NYSTATIN 100000 U/G
CREAM TOPICAL
Refills: 0 | COMMUNITY
Start: 2018-01-01

## 2018-01-01 RX ORDER — DEXMEDETOMIDINE HYDROCHLORIDE 4 UG/ML
0.2 INJECTION, SOLUTION INTRAVENOUS CONTINUOUS
Status: DISCONTINUED | OUTPATIENT
Start: 2018-01-01 | End: 2018-01-01

## 2018-01-01 RX ORDER — ACETAMINOPHEN 650 MG/1
SUPPOSITORY RECTAL
Refills: 0 | COMMUNITY
Start: 2018-01-29

## 2018-01-01 RX ORDER — PROMETHAZINE HYDROCHLORIDE 25 MG/1
TABLET ORAL
Refills: 0 | COMMUNITY
Start: 2018-01-29

## 2018-01-01 RX ORDER — MICONAZOLE NITRATE 20 MG/G
CREAM TOPICAL
Refills: 0 | COMMUNITY
Start: 2018-01-01

## 2018-01-01 RX ORDER — METOPROLOL SUCCINATE 25 MG/1
50 TABLET, EXTENDED RELEASE ORAL DAILY
Status: DISCONTINUED | OUTPATIENT
Start: 2018-01-01 | End: 2018-01-01 | Stop reason: HOSPADM

## 2018-01-01 RX ORDER — ASPIRIN 81 MG/1
81 TABLET ORAL DAILY
Qty: 90 TABLET | Refills: 3 | Status: SHIPPED | OUTPATIENT
Start: 2018-01-01 | End: 2018-01-01 | Stop reason: SDUPTHER

## 2018-01-01 RX ORDER — FINASTERIDE 5 MG/1
TABLET, FILM COATED ORAL
Qty: 90 TABLET | Refills: 3 | Status: SHIPPED | OUTPATIENT
Start: 2018-01-01

## 2018-01-01 RX ADMIN — LIDOCAINE HYDROCHLORIDE 5 ML: 10 INJECTION, SOLUTION INFILTRATION; PERINEURAL at 10:04

## 2018-01-01 RX ADMIN — DEXTROSE 500 MG: 50 INJECTION, SOLUTION INTRAVENOUS at 10:04

## 2018-01-01 RX ADMIN — SODIUM CHLORIDE, PRESERVATIVE FREE 3 ML: 5 INJECTION INTRAVENOUS at 06:04

## 2018-01-01 RX ADMIN — GALANTAMINE HYDROBROMIDE 4 MG: 4 TABLET, FILM COATED ORAL at 10:04

## 2018-01-01 RX ADMIN — SODIUM CHLORIDE: 0.9 INJECTION, SOLUTION INTRAVENOUS at 03:04

## 2018-01-01 RX ADMIN — PHYTONADIONE 10 MG: 10 INJECTION, EMULSION INTRAMUSCULAR; INTRAVENOUS; SUBCUTANEOUS at 09:04

## 2018-01-01 RX ADMIN — SODIUM CHLORIDE, PRESERVATIVE FREE 3 ML: 5 INJECTION INTRAVENOUS at 10:04

## 2018-01-01 RX ADMIN — GALANTAMINE HYDROBROMIDE 4 MG: 4 TABLET, FILM COATED ORAL at 09:04

## 2018-01-01 RX ADMIN — LORAZEPAM 1 MG: 2 INJECTION, SOLUTION INTRAMUSCULAR; INTRAVENOUS at 07:04

## 2018-01-01 RX ADMIN — LEVETIRACETAM 500 MG: 500 TABLET ORAL at 09:04

## 2018-01-01 RX ADMIN — METOPROLOL SUCCINATE 50 MG: 25 TABLET, EXTENDED RELEASE ORAL at 10:04

## 2018-01-01 RX ADMIN — LORAZEPAM 1 MG: 2 INJECTION, SOLUTION INTRAMUSCULAR; INTRAVENOUS at 11:04

## 2018-01-01 RX ADMIN — DEXTROSE 500 MG: 50 INJECTION, SOLUTION INTRAVENOUS at 08:04

## 2018-01-01 RX ADMIN — AMLODIPINE BESYLATE 2.5 MG: 2.5 TABLET ORAL at 09:04

## 2018-01-01 RX ADMIN — AMLODIPINE BESYLATE 2.5 MG: 2.5 TABLET ORAL at 10:04

## 2018-01-01 RX ADMIN — SODIUM CHLORIDE: 0.9 INJECTION, SOLUTION INTRAVENOUS at 01:04

## 2018-01-01 RX ADMIN — METOPROLOL SUCCINATE 50 MG: 25 TABLET, EXTENDED RELEASE ORAL at 09:04

## 2018-01-01 RX ADMIN — ONDANSETRON 4 MG: 2 INJECTION INTRAMUSCULAR; INTRAVENOUS at 11:04

## 2018-01-01 RX ADMIN — SODIUM CHLORIDE, PRESERVATIVE FREE 3 ML: 5 INJECTION INTRAVENOUS at 09:04

## 2018-01-01 RX ADMIN — SODIUM CHLORIDE, PRESERVATIVE FREE 3 ML: 5 INJECTION INTRAVENOUS at 02:04

## 2018-01-01 RX ADMIN — DEXMEDETOMIDINE HYDROCHLORIDE 0.2 MCG/KG/HR: 100 INJECTION, SOLUTION, CONCENTRATE INTRAVENOUS at 10:04

## 2018-01-01 RX ADMIN — SODIUM CHLORIDE, PRESERVATIVE FREE 3 ML: 5 INJECTION INTRAVENOUS at 01:04

## 2018-01-01 RX ADMIN — PROTHROMBIN, COAGULATION FACTOR VII HUMAN, COAGULATION FACTOR IX HUMAN, COAGULATION FACTOR X HUMAN, PROTEIN C, PROTEIN S HUMAN, AND WATER 1757.5 UNITS: KIT at 04:04

## 2018-01-01 RX ADMIN — IOHEXOL 75 ML: 350 INJECTION, SOLUTION INTRAVENOUS at 08:04

## 2018-01-01 RX ADMIN — MIDAZOLAM HYDROCHLORIDE 1 MG: 1 INJECTION, SOLUTION INTRAMUSCULAR; INTRAVENOUS at 06:04

## 2018-01-07 ENCOUNTER — HOSPITAL ENCOUNTER (OUTPATIENT)
Facility: OTHER | Age: 78
End: 2018-01-12
Attending: EMERGENCY MEDICINE | Admitting: EMERGENCY MEDICINE
Payer: MEDICARE

## 2018-01-07 DIAGNOSIS — E78.00 HYPERCHOLESTEROLEMIA: ICD-10-CM

## 2018-01-07 DIAGNOSIS — E11.9 TYPE 2 DIABETES MELLITUS WITHOUT COMPLICATION, WITHOUT LONG-TERM CURRENT USE OF INSULIN: ICD-10-CM

## 2018-01-07 DIAGNOSIS — I48.21 PERMANENT ATRIAL FIBRILLATION: ICD-10-CM

## 2018-01-07 DIAGNOSIS — N30.00 ACUTE CYSTITIS WITHOUT HEMATURIA: ICD-10-CM

## 2018-01-07 DIAGNOSIS — F02.80 DEMENTIA DUE TO PARKINSON'S DISEASE WITHOUT BEHAVIORAL DISTURBANCE: ICD-10-CM

## 2018-01-07 DIAGNOSIS — N48.89 PENILE SWELLING: Primary | ICD-10-CM

## 2018-01-07 DIAGNOSIS — G20.A1 DEMENTIA DUE TO PARKINSON'S DISEASE WITHOUT BEHAVIORAL DISTURBANCE: ICD-10-CM

## 2018-01-07 DIAGNOSIS — Z79.01 CHRONIC ANTICOAGULATION: ICD-10-CM

## 2018-01-07 LAB
ALBUMIN SERPL BCP-MCNC: 3.1 G/DL
ALP SERPL-CCNC: 108 U/L
ALT SERPL W/O P-5'-P-CCNC: 14 U/L
ANION GAP SERPL CALC-SCNC: 8 MMOL/L
AST SERPL-CCNC: 16 U/L
BACTERIA #/AREA URNS HPF: ABNORMAL /HPF
BASOPHILS # BLD AUTO: 0.02 K/UL
BASOPHILS NFR BLD: 0.2 %
BILIRUB SERPL-MCNC: 0.7 MG/DL
BILIRUB UR QL STRIP: NEGATIVE
BUN SERPL-MCNC: 13 MG/DL
CALCIUM SERPL-MCNC: 10.3 MG/DL
CHLORIDE SERPL-SCNC: 107 MMOL/L
CLARITY UR: ABNORMAL
CO2 SERPL-SCNC: 24 MMOL/L
COLOR UR: YELLOW
CREAT SERPL-MCNC: 1 MG/DL
DIFFERENTIAL METHOD: ABNORMAL
EOSINOPHIL # BLD AUTO: 0.3 K/UL
EOSINOPHIL NFR BLD: 2.7 %
ERYTHROCYTE [DISTWIDTH] IN BLOOD BY AUTOMATED COUNT: 14.3 %
EST. GFR  (AFRICAN AMERICAN): >60 ML/MIN/1.73 M^2
EST. GFR  (NON AFRICAN AMERICAN): >60 ML/MIN/1.73 M^2
GLUCOSE SERPL-MCNC: 128 MG/DL
GLUCOSE UR QL STRIP: NEGATIVE
HCT VFR BLD AUTO: 37.3 %
HGB BLD-MCNC: 12.2 G/DL
HGB UR QL STRIP: ABNORMAL
KETONES UR QL STRIP: NEGATIVE
LEUKOCYTE ESTERASE UR QL STRIP: ABNORMAL
LYMPHOCYTES # BLD AUTO: 1.7 K/UL
LYMPHOCYTES NFR BLD: 17 %
MCH RBC QN AUTO: 30.4 PG
MCHC RBC AUTO-ENTMCNC: 32.7 G/DL
MCV RBC AUTO: 93 FL
MICROSCOPIC COMMENT: ABNORMAL
MONOCYTES # BLD AUTO: 0.9 K/UL
MONOCYTES NFR BLD: 8.6 %
NEUTROPHILS # BLD AUTO: 7 K/UL
NEUTROPHILS NFR BLD: 71.3 %
NITRITE UR QL STRIP: POSITIVE
PH UR STRIP: 7 [PH] (ref 5–8)
PLATELET # BLD AUTO: 159 K/UL
PMV BLD AUTO: 10.1 FL
POTASSIUM SERPL-SCNC: 4 MMOL/L
PROT SERPL-MCNC: 6.2 G/DL
PROT UR QL STRIP: ABNORMAL
RBC # BLD AUTO: 4.01 M/UL
RBC #/AREA URNS HPF: 50 /HPF (ref 0–4)
SODIUM SERPL-SCNC: 139 MMOL/L
SP GR UR STRIP: 1.01 (ref 1–1.03)
SQUAMOUS #/AREA URNS HPF: 6 /HPF
URN SPEC COLLECT METH UR: ABNORMAL
UROBILINOGEN UR STRIP-ACNC: ABNORMAL EU/DL
WBC # BLD AUTO: 9.87 K/UL
WBC #/AREA URNS HPF: >100 /HPF (ref 0–5)
WBC CLUMPS URNS QL MICRO: ABNORMAL
YEAST URNS QL MICRO: ABNORMAL

## 2018-01-07 PROCEDURE — 80053 COMPREHEN METABOLIC PANEL: CPT

## 2018-01-07 PROCEDURE — G0378 HOSPITAL OBSERVATION PER HR: HCPCS

## 2018-01-07 PROCEDURE — 85025 COMPLETE CBC W/AUTO DIFF WBC: CPT

## 2018-01-07 PROCEDURE — 99284 EMERGENCY DEPT VISIT MOD MDM: CPT

## 2018-01-07 PROCEDURE — 81000 URINALYSIS NONAUTO W/SCOPE: CPT

## 2018-01-08 PROBLEM — N39.0 URINARY TRACT INFECTION WITHOUT HEMATURIA: Status: ACTIVE | Noted: 2018-01-08

## 2018-01-08 LAB
ALBUMIN SERPL BCP-MCNC: 2.8 G/DL
ALP SERPL-CCNC: 98 U/L
ALT SERPL W/O P-5'-P-CCNC: 13 U/L
ANION GAP SERPL CALC-SCNC: 7 MMOL/L
AST SERPL-CCNC: 16 U/L
BASOPHILS # BLD AUTO: 0.02 K/UL
BASOPHILS NFR BLD: 0.2 %
BILIRUB SERPL-MCNC: 0.7 MG/DL
BUN SERPL-MCNC: 12 MG/DL
CALCIUM SERPL-MCNC: 10 MG/DL
CHLORIDE SERPL-SCNC: 109 MMOL/L
CHOLEST SERPL-MCNC: 97 MG/DL
CHOLEST/HDLC SERPL: 5.1 {RATIO}
CO2 SERPL-SCNC: 22 MMOL/L
CREAT SERPL-MCNC: 0.8 MG/DL
DIFFERENTIAL METHOD: ABNORMAL
EOSINOPHIL # BLD AUTO: 0.3 K/UL
EOSINOPHIL NFR BLD: 3.4 %
ERYTHROCYTE [DISTWIDTH] IN BLOOD BY AUTOMATED COUNT: 14.3 %
EST. GFR  (AFRICAN AMERICAN): >60 ML/MIN/1.73 M^2
EST. GFR  (NON AFRICAN AMERICAN): >60 ML/MIN/1.73 M^2
ESTIMATED AVG GLUCOSE: 108 MG/DL
GLUCOSE SERPL-MCNC: 122 MG/DL
HBA1C MFR BLD HPLC: 5.4 %
HCT VFR BLD AUTO: 36.3 %
HDLC SERPL-MCNC: 19 MG/DL
HDLC SERPL: 19.6 %
HGB BLD-MCNC: 11.9 G/DL
INR PPP: 2.3
LDLC SERPL CALC-MCNC: 54 MG/DL
LYMPHOCYTES # BLD AUTO: 1.4 K/UL
LYMPHOCYTES NFR BLD: 15.1 %
MAGNESIUM SERPL-MCNC: 1.9 MG/DL
MCH RBC QN AUTO: 30.4 PG
MCHC RBC AUTO-ENTMCNC: 32.8 G/DL
MCV RBC AUTO: 93 FL
MONOCYTES # BLD AUTO: 0.8 K/UL
MONOCYTES NFR BLD: 8.6 %
NEUTROPHILS # BLD AUTO: 6.9 K/UL
NEUTROPHILS NFR BLD: 72.5 %
NONHDLC SERPL-MCNC: 78 MG/DL
PHOSPHATE SERPL-MCNC: 2.1 MG/DL
PLATELET # BLD AUTO: 160 K/UL
PMV BLD AUTO: 10.3 FL
POTASSIUM SERPL-SCNC: 3.8 MMOL/L
PROT SERPL-MCNC: 5.6 G/DL
PROTHROMBIN TIME: 25 SEC
RBC # BLD AUTO: 3.92 M/UL
SODIUM SERPL-SCNC: 138 MMOL/L
TRIGL SERPL-MCNC: 120 MG/DL
WBC # BLD AUTO: 9.47 K/UL

## 2018-01-08 PROCEDURE — 83036 HEMOGLOBIN GLYCOSYLATED A1C: CPT

## 2018-01-08 PROCEDURE — 63600175 PHARM REV CODE 636 W HCPCS: Performed by: HOSPITALIST

## 2018-01-08 PROCEDURE — 97530 THERAPEUTIC ACTIVITIES: CPT

## 2018-01-08 PROCEDURE — 99220 PR INITIAL OBSERVATION CARE,LEVL III: CPT | Mod: ,,, | Performed by: HOSPITALIST

## 2018-01-08 PROCEDURE — 83735 ASSAY OF MAGNESIUM: CPT

## 2018-01-08 PROCEDURE — 99900035 HC TECH TIME PER 15 MIN (STAT)

## 2018-01-08 PROCEDURE — G0378 HOSPITAL OBSERVATION PER HR: HCPCS

## 2018-01-08 PROCEDURE — 97163 PT EVAL HIGH COMPLEX 45 MIN: CPT

## 2018-01-08 PROCEDURE — 99203 OFFICE O/P NEW LOW 30 MIN: CPT | Mod: ,,, | Performed by: NURSE PRACTITIONER

## 2018-01-08 PROCEDURE — 85025 COMPLETE CBC W/AUTO DIFF WBC: CPT

## 2018-01-08 PROCEDURE — 85610 PROTHROMBIN TIME: CPT

## 2018-01-08 PROCEDURE — G8978 MOBILITY CURRENT STATUS: HCPCS | Mod: CL

## 2018-01-08 PROCEDURE — 84100 ASSAY OF PHOSPHORUS: CPT

## 2018-01-08 PROCEDURE — 25000003 PHARM REV CODE 250: Performed by: NURSE PRACTITIONER

## 2018-01-08 PROCEDURE — 80061 LIPID PANEL: CPT

## 2018-01-08 PROCEDURE — 36415 COLL VENOUS BLD VENIPUNCTURE: CPT

## 2018-01-08 PROCEDURE — 80053 COMPREHEN METABOLIC PANEL: CPT

## 2018-01-08 PROCEDURE — G8979 MOBILITY GOAL STATUS: HCPCS | Mod: CI

## 2018-01-08 RX ORDER — NITROGLYCERIN 0.4 MG/1
0.4 TABLET SUBLINGUAL EVERY 5 MIN PRN
Status: DISCONTINUED | OUTPATIENT
Start: 2018-01-08 | End: 2018-01-12 | Stop reason: HOSPADM

## 2018-01-08 RX ORDER — ONDANSETRON 2 MG/ML
4 INJECTION INTRAMUSCULAR; INTRAVENOUS EVERY 8 HOURS PRN
Status: DISCONTINUED | OUTPATIENT
Start: 2018-01-08 | End: 2018-01-12 | Stop reason: HOSPADM

## 2018-01-08 RX ORDER — GALANTAMINE 8 MG/1
8 TABLET, FILM COATED ORAL NIGHTLY
Status: DISCONTINUED | OUTPATIENT
Start: 2018-01-08 | End: 2018-01-12 | Stop reason: HOSPADM

## 2018-01-08 RX ORDER — ACETAMINOPHEN 325 MG/1
650 TABLET ORAL EVERY 4 HOURS PRN
Status: DISCONTINUED | OUTPATIENT
Start: 2018-01-08 | End: 2018-01-12 | Stop reason: HOSPADM

## 2018-01-08 RX ORDER — ASPIRIN 81 MG/1
81 TABLET ORAL NIGHTLY
Status: DISCONTINUED | OUTPATIENT
Start: 2018-01-08 | End: 2018-01-12 | Stop reason: HOSPADM

## 2018-01-08 RX ORDER — TROSPIUM CHLORIDE ER 60 MG/1
60 CAPSULE ORAL EVERY MORNING
Status: DISCONTINUED | OUTPATIENT
Start: 2018-01-08 | End: 2018-01-08

## 2018-01-08 RX ORDER — CHOLECALCIFEROL (VITAMIN D3) 25 MCG
1000 TABLET ORAL DAILY
Status: DISCONTINUED | OUTPATIENT
Start: 2018-01-08 | End: 2018-01-12 | Stop reason: HOSPADM

## 2018-01-08 RX ORDER — WARFARIN SODIUM 5 MG/1
5 TABLET ORAL DAILY
Status: DISCONTINUED | OUTPATIENT
Start: 2018-01-08 | End: 2018-01-12 | Stop reason: HOSPADM

## 2018-01-08 RX ORDER — FERROUS SULFATE 325(65) MG
325 TABLET, DELAYED RELEASE (ENTERIC COATED) ORAL DAILY
Status: DISCONTINUED | OUTPATIENT
Start: 2018-01-08 | End: 2018-01-12 | Stop reason: HOSPADM

## 2018-01-08 RX ORDER — MEMANTINE HYDROCHLORIDE 10 MG/1
10 TABLET ORAL 2 TIMES DAILY
Status: DISCONTINUED | OUTPATIENT
Start: 2018-01-08 | End: 2018-01-12 | Stop reason: HOSPADM

## 2018-01-08 RX ORDER — CEFTRIAXONE 1 G/1
1 INJECTION, POWDER, FOR SOLUTION INTRAMUSCULAR; INTRAVENOUS
Status: DISCONTINUED | OUTPATIENT
Start: 2018-01-08 | End: 2018-01-08

## 2018-01-08 RX ORDER — POTASSIUM CITRATE 5 MEQ/1
10 TABLET, EXTENDED RELEASE ORAL
Status: DISCONTINUED | OUTPATIENT
Start: 2018-01-08 | End: 2018-01-12 | Stop reason: HOSPADM

## 2018-01-08 RX ORDER — ROSUVASTATIN CALCIUM 5 MG/1
5 TABLET, COATED ORAL NIGHTLY
Status: DISCONTINUED | OUTPATIENT
Start: 2018-01-08 | End: 2018-01-12 | Stop reason: HOSPADM

## 2018-01-08 RX ORDER — SODIUM CHLORIDE 9 MG/ML
INJECTION, SOLUTION INTRAVENOUS CONTINUOUS
Status: DISCONTINUED | OUTPATIENT
Start: 2018-01-08 | End: 2018-01-12 | Stop reason: HOSPADM

## 2018-01-08 RX ORDER — AMLODIPINE BESYLATE 2.5 MG/1
2.5 TABLET ORAL DAILY
Status: DISCONTINUED | OUTPATIENT
Start: 2018-01-08 | End: 2018-01-12 | Stop reason: HOSPADM

## 2018-01-08 RX ORDER — PANTOPRAZOLE SODIUM 40 MG/1
40 TABLET, DELAYED RELEASE ORAL DAILY
Status: DISCONTINUED | OUTPATIENT
Start: 2018-01-08 | End: 2018-01-12 | Stop reason: HOSPADM

## 2018-01-08 RX ORDER — IPRATROPIUM BROMIDE AND ALBUTEROL SULFATE 2.5; .5 MG/3ML; MG/3ML
3 SOLUTION RESPIRATORY (INHALATION) EVERY 4 HOURS PRN
Status: DISCONTINUED | OUTPATIENT
Start: 2018-01-08 | End: 2018-01-12 | Stop reason: HOSPADM

## 2018-01-08 RX ORDER — METOPROLOL SUCCINATE 50 MG/1
50 TABLET, EXTENDED RELEASE ORAL EVERY MORNING
Status: DISCONTINUED | OUTPATIENT
Start: 2018-01-08 | End: 2018-01-12 | Stop reason: HOSPADM

## 2018-01-08 RX ORDER — SODIUM CHLORIDE 0.9 % (FLUSH) 0.9 %
5 SYRINGE (ML) INJECTION
Status: DISCONTINUED | OUTPATIENT
Start: 2018-01-08 | End: 2018-01-12 | Stop reason: HOSPADM

## 2018-01-08 RX ORDER — QUETIAPINE FUMARATE 25 MG/1
25 TABLET, FILM COATED ORAL DAILY
Status: DISCONTINUED | OUTPATIENT
Start: 2018-01-08 | End: 2018-01-12 | Stop reason: HOSPADM

## 2018-01-08 RX ORDER — FINASTERIDE 5 MG/1
5 TABLET, FILM COATED ORAL DAILY
Status: DISCONTINUED | OUTPATIENT
Start: 2018-01-08 | End: 2018-01-12 | Stop reason: HOSPADM

## 2018-01-08 RX ADMIN — CEFTRIAXONE 1 G: 1 INJECTION, SOLUTION INTRAVENOUS at 01:01

## 2018-01-08 RX ADMIN — OMEGA-3 FATTY ACIDS CAP DELAYED RELEASE 1000 MG 1 CAPSULE: 1000 CAPSULE DELAYED RELEASE at 10:01

## 2018-01-08 RX ADMIN — FINASTERIDE 5 MG: 5 TABLET, FILM COATED ORAL at 10:01

## 2018-01-08 RX ADMIN — SODIUM CHLORIDE: 0.9 INJECTION, SOLUTION INTRAVENOUS at 01:01

## 2018-01-08 RX ADMIN — ROSUVASTATIN CALCIUM 5 MG: 5 TABLET ORAL at 01:01

## 2018-01-08 RX ADMIN — WARFARIN SODIUM 5 MG: 5 TABLET ORAL at 05:01

## 2018-01-08 RX ADMIN — ASPIRIN 81 MG: 81 TABLET, COATED ORAL at 01:01

## 2018-01-08 RX ADMIN — SODIUM CHLORIDE: 0.9 INJECTION, SOLUTION INTRAVENOUS at 09:01

## 2018-01-08 RX ADMIN — MEMANTINE HYDROCHLORIDE 10 MG: 10 TABLET ORAL at 10:01

## 2018-01-08 RX ADMIN — PANTOPRAZOLE SODIUM 40 MG: 40 TABLET, DELAYED RELEASE ORAL at 10:01

## 2018-01-08 RX ADMIN — POTASSIUM CITRATE 10 MEQ: 5 TABLET ORAL at 11:01

## 2018-01-08 RX ADMIN — POTASSIUM CITRATE 10 MEQ: 5 TABLET ORAL at 05:01

## 2018-01-08 RX ADMIN — METOPROLOL SUCCINATE 50 MG: 50 TABLET, EXTENDED RELEASE ORAL at 06:01

## 2018-01-08 RX ADMIN — MEMANTINE HYDROCHLORIDE 10 MG: 10 TABLET ORAL at 09:01

## 2018-01-08 RX ADMIN — ASPIRIN 81 MG: 81 TABLET, COATED ORAL at 09:01

## 2018-01-08 RX ADMIN — VITAMIN D, TAB 1000IU (100/BT) 1000 UNITS: 25 TAB at 11:01

## 2018-01-08 RX ADMIN — ROSUVASTATIN CALCIUM 5 MG: 5 TABLET ORAL at 09:01

## 2018-01-08 RX ADMIN — FERROUS SULFATE TAB EC 325 MG (65 MG FE EQUIVALENT) 325 MG: 325 (65 FE) TABLET DELAYED RESPONSE at 10:01

## 2018-01-08 RX ADMIN — AMLODIPINE BESYLATE 2.5 MG: 2.5 TABLET ORAL at 10:01

## 2018-01-08 RX ADMIN — QUETIAPINE FUMARATE 25 MG: 25 TABLET, FILM COATED ORAL at 11:01

## 2018-01-08 RX ADMIN — POTASSIUM CITRATE 10 MEQ: 5 TABLET ORAL at 01:01

## 2018-01-08 NOTE — PLAN OF CARE
Problem: Patient Care Overview  Goal: Plan of Care Review  Outcome: Ongoing (interventions implemented as appropriate)  Pt remains on RA. No PRN required. Pt remains stable.

## 2018-01-08 NOTE — CONSULTS
[facility]  Urology  Consult Note    Patient Name: Rikki Morrison  MRN: [id number]  Admission Date: 1/7/2018  Hospital Length of Stay: 0   Code Status: Full Code   Attending Provider: Koko Yang MD   Consulting Provider: Tomasa Reyes NP  Primary Care Physician: ODALYS Villafuerte MD  Principal Problem:Penile swelling    Inpatient consult to Urology  Consult performed by: TOMASA REYES  Consult ordered by: RUTH BABCOCK  Reason for consult: Penile swelling  Assessment/Recommendations:  Penile swelling  -- Edema appears 2/2 to abdominal fluid administration   -- No evidence of scrotal abscess or skin breakdown   -- Scrotal/penile support and ice to relieve discomfort and alleviate swelling      Urinary tract infection without hematuria  -- UA + for nitrites, RBCs, and leukocytes   -- Culture not done   -- Antibx per primary team               Subjective:     HPI:  Mr. Morrison is a 77 y.o. male who presented to the ED via EMS with complaint of penile swelling which developed yesterday. Wife reports onset of fever 2 days ago. Per wife, the patient was given subcutaneous fluids through his abdomen yesterday by the nursing staff at [facility] for dehydration, which they believed to be the cause of the fever. She reports that he was given subcutaneous fluids today as well. Nursing staff noticed penile swelling when changing the patient's diaper yesterday. They also reported malodorous urine. UA was + for nitrites, leukocytes and RBCs. Culture was not done.     The history is provided by the daughter. The history is limited by the condition of the patient (dementia).     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 BMI 26.    Stricture and stenosis of esophagus 1/22/2014    Dilation 11/01, 12/13 Dr. Caldwell       Past Surgical History:   Procedure Laterality Date    CATARACT EXTRACTION, BILATERAL      HERNIA REPAIR      KNEE ARTHROSCOPY Right 1990    MITRAL VALVE REPLACEMENT  11/04    mechanical    Trigger finger right hand  1997       Review of patient's allergies indicates:   Allergen Reactions    Pcn [penicillins] Rash       Family History     Problem Relation (Age of Onset)    Cancer Father    Diabetes Mother    Heart disease Mother (65)          Social History Main Topics    Smoking status: Never Smoker    Smokeless tobacco: Never Used    Alcohol use No    Drug use: No    Sexual activity: Not Currently       Review of Systems   Constitutional: Negative for chills and fever.   Respiratory: Negative for chest tightness and shortness of breath.    Cardiovascular: Negative for chest pain and palpitations.   Gastrointestinal: Positive for abdominal distention. Negative for abdominal pain.   Genitourinary: Positive for penile swelling and scrotal swelling. Negative for difficulty urinating, discharge, dysuria, flank pain, hematuria, penile pain and testicular pain.       Objective:     Temp:  [97.7 °F (36.5 °C)-99.1 °F (37.3 °C)] 98.6 °F (37 °C)  Pulse:  [61-88] 88  Resp:  [18] 18  SpO2:  [95 %-98 %] 97 %  BP: (121-153)/(63-70) 143/65     Body mass index is 25.1 kg/m².       Bladder Scan Volume (mL): 49 mL (01/07/18 2215)    Drains          No matching active lines, drains, or airways          Physical Exam   Constitutional: He appears well-developed and well-nourished.   Cardiovascular: Normal rate, regular rhythm, S1 normal and  S2 normal.    Pulmonary/Chest: Effort normal and breath sounds normal. He has no decreased breath sounds.   Abdominal: Soft. Bowel sounds are normal. He exhibits distension.   Genitourinary: Right testis shows swelling. Right testis shows no mass and no tenderness. Left testis shows swelling. Left testis shows no mass and no tenderness. No phimosis, paraphimosis or penile tenderness.   Genitourinary Comments: Mild swelling of scrotum and penis. No skin breakdown noted. No crepitus, induration or fluctuance noted. Condom catheter in place draining clear, brad urine (no sediment).    Neurological: He is alert.   Skin: Skin is warm and dry.         Significant Labs:    BMP:    Recent Labs  Lab 01/07/18 2219 01/08/18  0432    138   K 4.0 3.8    109   CO2 24 22*   BUN 13 12   CREATININE 1.0 0.8   CALCIUM 10.3 10.0       CBC:    Recent Labs  Lab 01/07/18 2219 01/08/18  0433   WBC 9.87 9.47   HGB 12.2* 11.9*   HCT 37.3* 36.3*    160       Urine Culture: No results for input(s): LABURIN in the last 168 hours.  Urine Studies:   Recent Labs  Lab 01/07/18  2250   COLORU Yellow   APPEARANCEUA Cloudy*   PHUR 7.0   SPECGRAV 1.010   PROTEINUA Trace*   GLUCUA Negative   KETONESU Negative   BILIRUBINUA Negative   OCCULTUA 3+*   NITRITE Positive*   UROBILINOGEN 4.0-6.0*   LEUKOCYTESUR 3+*   RBCUA 50*   WBCUA >100*   BACTERIA Many*   SQUAMEPITHEL 6       Significant Imaging:  All pertinent imaging results/findings from the past 24 hours have been reviewed.                    Assessment and Plan:     * Penile swelling    -- Edema appears 2/2 to abdominal fluid administration   -- No evidence of scrotal abscess or skin breakdown   -- Scrotal/penile support and ice to relieve discomfort and alleviate swelling         Urinary tract infection without hematuria    -- UA + for nitrites, RBCs, and leukocytes   -- Culture not done   -- Antibx per primary team             VTE Risk Mitigation         Ordered     warfarin  (COUMADIN) tablet 5 mg  Daily     Route:  Oral        01/08/18 0106     Place sequential compression device  Until discontinued      01/08/18 0106     Medium Risk of VTE  Once      01/08/18 0106     Reason for No Pharmacological VTE Prophylaxis  Once      01/08/18 0106     Place MYRTLE hose  Until discontinued      01/08/18 0106     Place sequential compression device  Until discontinued      01/08/18 0106          Thank you for your consult. I will sign off. Please contact us if you have any additional questions.    Tomasa Adrian NP  Urology  [facility]

## 2018-01-08 NOTE — PT/OT/SLP EVAL
Physical Therapy Evaluation and Treatment    Patient Name:  Rikki Morrison   MRN:  [id number]    Recommendations:     Discharge Recommendations:   (Return to SNF)   Discharge Equipment Recommendations:  (TBD)   Barriers to discharge: Inaccessible home and Decreased caregiver support    Assessment:     Rikki Morrison is a 77 y.o. male admitted with a medical diagnosis of Penile swelling.  He presents with the following impairments/functional limitations:  weakness, impaired self care skills, impaired balance, decreased lower extremity function, gait instability, decreased coordination, impaired cognition, decreased safety awareness, impaired coordination, impaired skin, edema, impaired functional mobilty, decreased upper extremity function. Pt admitted to [facility] from [facility]. He currently requires max A for all bed mobility and transfers and is disoriented x 4. He remains a high fall risk, recommend bed pan for BMs and OOB mobility with PT/OT only at this time.     Rehab Prognosis:  Good; patient would benefit from acute skilled PT services to address these deficits and reach maximum level of function.      Recent Surgery: * No surgery found *      Plan:     During this hospitalization, patient to be seen 6 x/week to address the above listed problems via gait training, therapeutic exercises, therapeutic activities, neuromuscular re-education  · Plan of Care Expires:  02/07/18   Plan of Care Reviewed with: patient    Subjective     Communicated with RN prior to session.  Patient found Supine upon PT entry to room, agreeable to evaluation.      Chief Complaint: None stated throughout session  Patient comments/goals: per wife, to return to SNF for more therapy. None stated from patient.   Pain/Comfort:  · Pain Rating 1:  (unable to rate pain)    Patients cultural, spiritual, Hindu conflicts given the current situation: None specified by wife who was present during session    Living Environment:  Hx given  by wife who was present during session. Pt was admitted to SNF last from at [facility] from [facility]. He has recently been ambulating with a RW, a chair follow, and 2 people daily secondary to high fall risk. He has not had a fall since his last admit last month. Wife states the SNF does not keep him up in a chair because of his high fall risk, therefore he remains in bed when he is not participating in therapy.  Upon discharge, patient will have assistance from no one.    Objective:     Patient found with: peripheral IV (condom catheter)     General Precautions: Standard, fall   Orthopedic Precautions:N/A   Braces: N/A     Exams:  · Cognition: Patient is oriented to 0/4 and follows approximately 0% of one step simple commands.    · Posture:    · -       Rounded shoulders  · -       Forward head  · -       Kyphosis  · Sensation: unable to properly assess  · Skin Integrity: see wound care RN note  · Edema: Per chart review, penile edema   · LE ROM/Strength: Unable to properly assess  · Tone: No tone impairments identified during functional mobility.     Functional Mobility:  · Bed Mobility:     · Scooting: minimum assistance towards HOB in supine  · Supine to Sit: maximal assistance  · Sit to Supine: maximal assistance  · Transfers:     · Sit to Stand:  maximal assistance with rolling walker  · Balance: Poor sitting balance without trunk support sitting at EOB. Pt required assist for anterior trunk lean and BUE support and trunk support to prevent posterior lean and LOB seated at EOB. Poor standing balance with severe flexed posture    AM-PAC 6 CLICK MOBILITY  Total Score:11     Patient left supine with all lines intact, call button in reach, bed alarm on, RN notified and wife present.    GOALS:    Physical Therapy Goals        Problem: Physical Therapy Goal    Goal Priority Disciplines Outcome Goal Variances Interventions   Physical Therapy Goal     PT/OT, PT Ongoing (interventions implemented as appropriate)      Description:  Goals to be met by: 18     Patient will increase functional independence with mobility by performin. Supine to sit with Min A.   2. Sit to supine with min A.   3. Sit<>stand transfer with Min A using rolling walker.   4. Gait > 50 feet with Min A using rolling walker.   5. SPT from EOB < chair with or without AD and min A                  History:     Past Medical History:   Diagnosis Date    *Atrial fibrillation     Abnormal echocardiogram 2012    Atrial fibrillation 2012    Bacterial endocarditis 2012    Diabetes mellitus type II     DM (diabetes mellitus) 2012    Family history of premature CAD 2012    GERD (gastroesophageal reflux disease) 2012    HDL lipoprotein deficiency 2012    Hearing loss, bilateral 2014    Bilat aids    History of mitral valve replacement 2012    Hyperlipidemia     Kidney stones 2012    Overweight 3/22/2017    3/22/2017: Weight 84 kg. BMI 26.    Stricture and stenosis of esophagus 2014    Dilation ,  Dr. Caldwell       Past Surgical History:   Procedure Laterality Date    CATARACT EXTRACTION, BILATERAL      HERNIA REPAIR      KNEE ARTHROSCOPY Right     MITRAL VALVE REPLACEMENT      mechanical    Trigger finger right hand         Clinical Decision Making:     History  Co-morbidities and personal factors that may impact the plan of care Examination  Body Structures and Functions, activity limitations and participation restrictions that may impact the plan of care Clinical Presentation   Decision Making/ Complexity Score   Co-morbidities:   [] Time since onset of injury / illness / exacerbation  [] Status of current condition  []Patient's cognitive status and safety concerns    [] Multiple Medical Problems (see med hx)  Personal Factors:   [] Patient's age  [] Prior Level of function   [] Patient's home situation (environment and family support)  [] Patient's  level of motivation  [] Expected progression of patient      HISTORY:(criteria)    [] 41894 - no personal factors/history    [] 77368 - has 1-2 personal factor/comorbidity     [] 55347 - has >3 personal factor/comorbidity     Body Regions:  [] Objective examination findings  [] Head     []  Neck  [] Trunk   [] Upper Extremity  [] Lower Extremity    Body Systems:  [] For communication ability, affect, cognition, language, and learning style: the assessment of the ability to make needs known, consciousness, orientation (person, place, and time), expected emotional /behavioral responses, and learning preferences (eg, learning barriers, education  needs)  [] For the neuromuscular system: a general assessment of gross coordinated movement (eg, balance, gait, locomotion, transfers, and transitions) and motor function  (motor control and motor learning)  [] For the musculoskeletal system: the assessment of gross symmetry, gross range of motion, gross strength, height, and weight  [] For the integumentary system: the assessment of pliability(texture), presence of scar formation, skin color, and skin integrity  [] For cardiovascular/pulmonary system: the assessment of heart rate, respiratory rate, blood pressure, and edema     Activity limitations:    [] Patient's cognitive status and saf ety concerns          [] Status of current condition      [] Weight bearing restriction  [] Cardiopulmunary Restriction    Participation Restrictions:   [] Goals and goal agreement with the patient     [] Rehab potential (prognosis) and probable outcome      Examination of Body System: (criteria)    [] 80099 - addressing 1-2 elements    [] 48604 - addressing a total of 3 or more elements     [] 23373 -  Addressing a total of 4 or more elements         Clinical Presentation: (criteria)  Choose one     On examination of body system using standardized tests and measures patient presents with (CHOOSE ONE) elements from any of the following:  body structures and functions, activity limitations, and/or participation restrictions.  Leading to a clinical presentation that is considered (CHOOSE ONE)                              Clinical Decision Making  (Eval Complexity):  High - 95230     Time Tracking:     PT Received On: 01/08/18  PT Start Time: 1510     PT Stop Time: 1540  PT Total Time (min): 30 min     Billable Minutes: Evaluation 15 and Therapeutic Activity 15    Brigette Garrison, PT, DPT  01/08/2018

## 2018-01-08 NOTE — SUBJECTIVE & OBJECTIVE
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 BMI 26.    Stricture and stenosis of esophagus 1/22/2014    Dilation 11/01, 12/13 Dr. Caldwell       Past Surgical History:   Procedure Laterality Date    CATARACT EXTRACTION, BILATERAL      HERNIA REPAIR      KNEE ARTHROSCOPY Right 1990    MITRAL VALVE REPLACEMENT  11/04    mechanical    Trigger finger right hand  1997       Review of patient's allergies indicates:   Allergen Reactions    Pcn [penicillins] Rash       Family History     Problem Relation (Age of Onset)    Cancer Father    Diabetes Mother    Heart disease Mother (65)          Social History Main Topics    Smoking status: Never Smoker    Smokeless tobacco: Never Used    Alcohol use No    Drug use: No    Sexual activity: Not Currently       Review of Systems   Constitutional: Negative for chills and fever.   Respiratory: Negative for chest tightness and shortness of breath.    Cardiovascular: Negative for chest pain and palpitations.   Gastrointestinal: Positive for abdominal distention. Negative for abdominal pain.   Genitourinary: Positive for penile swelling and scrotal swelling. Negative for difficulty urinating, discharge, dysuria, flank pain, hematuria, penile pain and testicular pain.       Objective:     Temp:  [97.7 °F (36.5 °C)-99.1 °F (37.3 °C)] 98.6 °F (37 °C)  Pulse:  [61-88] 88  Resp:  [18] 18  SpO2:  [95 %-98 %] 97 %  BP: (121-153)/(63-70)  143/65     Body mass index is 25.1 kg/m².       Bladder Scan Volume (mL): 49 mL (01/07/18 2215)    Drains          No matching active lines, drains, or airways          Physical Exam   Constitutional: He appears well-developed and well-nourished.   Cardiovascular: Normal rate, regular rhythm, S1 normal and S2 normal.    Pulmonary/Chest: Effort normal and breath sounds normal. He has no decreased breath sounds.   Abdominal: Soft. Bowel sounds are normal. He exhibits distension.   Genitourinary: Right testis shows swelling. Right testis shows no mass and no tenderness. Left testis shows swelling. Left testis shows no mass and no tenderness. No phimosis, paraphimosis or penile tenderness.   Genitourinary Comments: Mild swelling of scrotum and penis. No skin breakdown noted. No crepitus, induration or fluctuance noted. Condom catheter in place draining clear, brad urine (no sediment).    Neurological: He is alert.   Skin: Skin is warm and dry.         Significant Labs:    BMP:    Recent Labs  Lab 01/07/18 2219 01/08/18  0432    138   K 4.0 3.8    109   CO2 24 22*   BUN 13 12   CREATININE 1.0 0.8   CALCIUM 10.3 10.0       CBC:    Recent Labs  Lab 01/07/18 2219 01/08/18  0433   WBC 9.87 9.47   HGB 12.2* 11.9*   HCT 37.3* 36.3*    160       Urine Culture: No results for input(s): LABURIN in the last 168 hours.  Urine Studies:   Recent Labs  Lab 01/07/18  2250   COLORU Yellow   APPEARANCEUA Cloudy*   PHUR 7.0   SPECGRAV 1.010   PROTEINUA Trace*   GLUCUA Negative   KETONESU Negative   BILIRUBINUA Negative   OCCULTUA 3+*   NITRITE Positive*   UROBILINOGEN 4.0-6.0*   LEUKOCYTESUR 3+*   RBCUA 50*   WBCUA >100*   BACTERIA Many*   SQUAMEPITHEL 6       Significant Imaging:  All pertinent imaging results/findings from the past 24 hours have been reviewed.

## 2018-01-08 NOTE — PLAN OF CARE
DC PLANNING:    Patient resides at [facility], was referred there for SNF services from prior admission. Patient's wife Lesli is his main caregiver (see below for contact info). Patient disoriented/confused, unable to sign MOON form. Pending MD dispo as patient is recently admitted, CM to follow and remain supportive.     01/08/18 1136   Discharge Assessment   Assessment Type Discharge Planning Assessment   Assessment information obtained from? Caregiver;Medical Record   Prior to hospitilization cognitive status: Unable to Assess   Prior to hospitalization functional status: Completely Dependent   Current cognitive status: Unable to Assess   Current Functional Status: Completely Dependent   Facility Arrived From: [facility]    Lives With facility resident  ([facility])   Able to Return to Prior Arrangements yes   Is patient able to care for self after discharge? No   Who are your caregiver(s) and their phone number(s)? Lesli Coughlin, wife, [phone]; [phone]   Patient currently being followed by outpatient case management? No   Equipment Currently Used at Home walker, rolling;wheelchair   Do you have any problems affording any of your prescribed medications? No   Is the patient taking medications as prescribed? yes   Does the patient have transportation home? Yes   Transportation Available agency transportation   Discharge Plan A Return to nursing home   Patient/Family In Agreement With Plan unable to assess

## 2018-01-08 NOTE — ED NOTES
Pt presents to ER via [facility] from [facility]. Pt had a subcutaneous hydration last night in his abdomen then today at about 7 pm they notice swelling to his penis only. Pt has dementia and is unable to express pain and is non verbal

## 2018-01-08 NOTE — CONSULTS
Patient with skin tear to right forearm, flap 75% approximated.  Bordered foam applied.  Skin tear is 1 cm across the base and flap extends 0.5 cm over 75% of tear.  Right elbow with crusted area 0.8 cm diameter; protected with bordered foam.  Patient reportedly uses elbows to push up with instead of hands and heels.

## 2018-01-08 NOTE — ASSESSMENT & PLAN NOTE
Patient received 1.8L subq infusion of fluid for dehydration. Penile swelling was noticed the next day.    Consult Urology

## 2018-01-08 NOTE — SUBJECTIVE & OBJECTIVE
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 BMI 26.    Stricture and stenosis of esophagus 1/22/2014    Dilation 11/01, 12/13 Dr. Caldwell       Past Surgical History:   Procedure Laterality Date    CATARACT EXTRACTION, BILATERAL      HERNIA REPAIR      KNEE ARTHROSCOPY Right 1990    MITRAL VALVE REPLACEMENT  11/04    mechanical    Trigger finger right hand  1997       Review of patient's allergies indicates:   Allergen Reactions    Pcn [penicillins] Rash       No current facility-administered medications on file prior to encounter.      Current Outpatient Prescriptions on File Prior to Encounter   Medication Sig    amlodipine (NORVASC) 2.5 MG tablet TAKE ONE TABLET BY MOUTH ONCE DAILY    aspirin (ECOTRIN) 81 MG EC tablet Take 1 tablet (81 mg total) by mouth once daily. (Patient taking differently: Take 81 mg by mouth every evening. )    cyanocobalamin, vitamin B-12, 1,000 mcg Subl Place 1 tablet under the tongue once daily.    ferrous sulfate 325 (65 FE) MG EC tablet Take 325 mg by mouth once daily. at dinner    finasteride (PROSCAR) 5 mg tablet TAKE ONE TABLET BY MOUTH ONCE DAILY    galantamine (RAZADYNE) 8 MG Tab Take 8 mg by mouth every evening.     memantine (NAMENDA) 10 MG Tab Take 10 mg by mouth 2 (two) times daily.     metoprolol succinate (TOPROL-XL) 50 MG 24 hr tablet TAKE ONE TABLET BY MOUTH ONCE DAILY (Patient taking differently: TAKE ONE TABLET BY  MOUTH ONCE DAILY IN THE MORNING)    omeprazole (PRILOSEC) 20 MG capsule TAKE ONE CAPSULE BY MOUTH TWICE DAILY (Patient taking differently: TAKE ONE CAPSULE BY MOUTH TWICE DAILY AS NEEDED)    rosuvastatin (CRESTOR) 5 MG tablet Take 1 tablet (5 mg total) by mouth every evening.    trospium (SANCTURA XR) 60 mg Cp24 capsule Take 60 mg by mouth every morning.     vortioxetine (TRINTELLIX) 10 mg Tab Take 10 mg by mouth once daily.    warfarin (COUMADIN) 5 MG tablet TAKE ONE TABLET BY MOUTH ONCE DAILY AT BEDTIME    cholecalciferol, vitamin D3, (VITAMIN D3) 1,000 unit capsule Take 1,000 Units by mouth once daily.    fish oil-omega-3 fatty acids 300-1,000 mg capsule Take 1 g by mouth once daily.    nitroGLYCERIN (NITROSTAT) 0.4 MG SL tablet Place 1 tablet (0.4 mg total) under the tongue every 5 (five) minutes as needed for Chest pain.    potassium citrate (UROCIT-K) 10 mEq (1,080 mg) TbSR Take 1 tablet (10 mEq total) by mouth 3 (three) times daily with meals.    QUEtiapine (SEROQUEL) 25 MG Tab Take 25 mg by mouth once daily. 1 hour prior to bed time    UBIDECARENONE (COENZYME Q10) 200 mg Tab Take 1 capsule by mouth once daily.      Family History     Problem Relation (Age of Onset)    Cancer Father    Diabetes Mother    Heart disease Mother (65)        Social History Main Topics    Smoking status: Never Smoker    Smokeless tobacco: Never Used    Alcohol use No    Drug use: No    Sexual activity: Not Currently     Review of Systems   Unable to perform ROS: Dementia     Objective:     Vital Signs (Most Recent):  Temp: 98.9 °F (37.2 °C) (01/08/18 0119)  Pulse: 61 (01/08/18 0200)  Resp: 18 (01/07/18 2127)  BP: (!) 153/70 (01/08/18 0119)  SpO2: 96 % (01/08/18 0119) Vital Signs (24h Range):  Temp:  [98.1 °F (36.7 °C)-99.1 °F (37.3 °C)] 98.9 °F (37.2 °C)  Pulse:  [61-72] 61  Resp:  [18] 18  SpO2:  [96 %-97 %] 96 %  BP: (121-153)/(63-70) 153/70     Weight: 81.6 kg (180 lb)  Body mass index is 25.1 kg/m².    Physical  Exam   Constitutional: He is oriented to person, place, and time. He appears well-developed and well-nourished.   HENT:   Head: Normocephalic.   Eyes: Conjunctivae are normal.   Neck: Normal range of motion. Neck supple.   Cardiovascular: Regular rhythm, normal heart sounds and intact distal pulses.  Bradycardia present.    Pulses:       Radial pulses are 1+ on the right side, and 1+ on the left side.        Dorsalis pedis pulses are 1+ on the right side, and 1+ on the left side.   Pulmonary/Chest: Effort normal and breath sounds normal.   Abdominal: Soft. He exhibits no distension. Bowel sounds are increased. There is tenderness in the suprapubic area.   Genitourinary: Penile tenderness present.   Genitourinary Comments: Penis is swollen, very tender.   Musculoskeletal: Normal range of motion.   Neurological: He is alert and oriented to person, place, and time.   Skin: Skin is warm and dry.   Psychiatric: He is slowed.           Significant Labs:   CBC:   Recent Labs  Lab 01/07/18  2219   WBC 9.87   HGB 12.2*   HCT 37.3*        CMP:   Recent Labs  Lab 01/07/18  2219      K 4.0      CO2 24   *   BUN 13   CREATININE 1.0   CALCIUM 10.3   PROT 6.2   ALBUMIN 3.1*   BILITOT 0.7   ALKPHOS 108   AST 16   ALT 14   ANIONGAP 8   EGFRNONAA >60       Significant Imaging: I have reviewed all pertinent imaging results/findings within the past 24 hours.

## 2018-01-08 NOTE — HPI
Mr. Morrison is a 77 y.o. male who presented to the ED via EMS with complaint of penile swelling which developed yesterday. Wife reports onset of fever 2 days ago. Per wife, the patient was given subcutaneous fluids through his abdomen yesterday by the nursing staff at [facility] for dehydration, which they believed to be the cause of the fever. She reports that he was given subcutaneous fluids today as well. Nursing staff noticed penile swelling when changing the patient's diaper yesterday. They also reported malodorous urine. UA was + for nitrites, leukocytes and RBCs. Culture was not done.     The history is provided by the daughter. The history is limited by the condition of the patient (dementia).

## 2018-01-08 NOTE — HPI
"Patient is a 77 y.o. male who presents to the ED via EMS with complaint of penile swelling. Wife reports onset of fever yesterday. Per wife, the patient was given subcutaneous fluids through his abdomen last night by the nursing staff at [facility] for dehydration, which they believed to be the cause of the fever. She reports that he was given subcutaneous fluids today as well. Nursing staff noticed penile swelling when changing the patient's diaper this afternoon. Wife states "they said it was the fluid that went the wrong way." She notes that his diaper was wet when it was changed at 4 PM today, but not at 7 PM. Patient is unable to communicate secondary to baseline dementia, and wife states "he can't tell you anything... he never complains and he didn't make any faces as if he were in pain today."  Wife denies any fever today.    "

## 2018-01-08 NOTE — PLAN OF CARE
Problem: Patient Care Overview  Goal: Plan of Care Review  Outcome: Ongoing (interventions implemented as appropriate)  O2 not in use, no request for PRN rx.

## 2018-01-08 NOTE — ASSESSMENT & PLAN NOTE
-- Edema appears 2/2 to abdominal fluid administration   -- No evidence of scrotal abscess or skin breakdown   -- Scrotal/penile support and ice to relieve discomfort and alleviate swelling

## 2018-01-08 NOTE — PLAN OF CARE
CM spoke with pt's spouse, Lesli, [phone]. Pt has been in [facility] for SNF since 12/8/17. Spouse states pt was due for discharge on Thurs, 1/11/18. However, spouse had a mammogram and has now scheduled a bx for thurs, and would like for pt to return to [facility] to complete SNF. Spouse is paying to hold bed. CM to follow for arrangements.

## 2018-01-08 NOTE — PT/OT/SLP PROGRESS
Occupational Therapy      Patient Name:  Rikki Morrison   MRN:  [id number]    Patient not seen today secondary to Patient fatigue. Pt attempted for evaluation this PM, however pt sleeping soundly. Pt had just finished working with PT. Will follow-up tomorrow for OT eval.    Karma Bird, OTR/L  1/8/2018

## 2018-01-08 NOTE — PLAN OF CARE
Problem: Physical Therapy Goal  Goal: Physical Therapy Goal  Goals to be met by: 18     Patient will increase functional independence with mobility by performin. Supine to sit with Min A.   2. Sit to supine with min A.   3. Sit<>stand transfer with Min A using rolling walker.   4. Gait > 50 feet with Min A using rolling walker.   5. SPT from EOB < chair with or without AD and min A  Outcome: Ongoing (interventions implemented as appropriate)  PT evaluation completed. Please see progress note for details, POC, and recommendations.

## 2018-01-08 NOTE — H&P
"[facility] Hospital Medicine  History & Physical    Patient Name: Rikki Morrison  MRN: [id number]  Admission Date: 1/7/2018  Attending Physician: Koko Yang MD   Primary Care Provider: ODALYS Villafuerte MD         Patient information was obtained from patient, past medical records and ER records.     Subjective:     Principal Problem:Penile swelling    Chief Complaint:   Chief Complaint   Patient presents with    Groin Swelling     pt having penis swelling since 7 this evening.  no discharge noted, denies any mechanism of injury        HPI: Patient is a 77 y.o. male who presents to the ED via EMS with complaint of penile swelling. Wife reports onset of fever yesterday. Per wife, the patient was given subcutaneous fluids through his abdomen last night by the nursing staff at [facility] for dehydration, which they believed to be the cause of the fever. She reports that he was given subcutaneous fluids today as well. Nursing staff noticed penile swelling when changing the patient's diaper this afternoon. Wife states "they said it was the fluid that went the wrong way." She notes that his diaper was wet when it was changed at 4 PM today, but not at 7 PM. Patient is unable to communicate secondary to baseline dementia, and wife states "he can't tell you anything... he never complains and he didn't make any faces as if he were in pain today."  Wife denies any fever today.       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 BMI 26.    Stricture and stenosis of esophagus 1/22/2014    Dilation 11/01, 12/13 Dr. Caldwell       Past Surgical History:   Procedure Laterality Date    CATARACT EXTRACTION, BILATERAL      HERNIA REPAIR      KNEE ARTHROSCOPY Right 1990    MITRAL VALVE REPLACEMENT  11/04    mechanical    Trigger finger right hand  1997       Review of patient's allergies indicates:   Allergen Reactions    Pcn [penicillins] Rash       No current facility-administered medications on file prior to encounter.      Current Outpatient Prescriptions on File Prior to Encounter   Medication Sig    amlodipine (NORVASC) 2.5 MG tablet TAKE ONE TABLET BY MOUTH ONCE DAILY    aspirin (ECOTRIN) 81 MG EC tablet Take 1 tablet (81 mg total) by mouth once daily. (Patient taking differently: Take 81 mg by mouth every evening. )    cyanocobalamin, vitamin B-12, 1,000 mcg Subl Place 1 tablet under the tongue once daily.    ferrous sulfate 325 (65 FE) MG EC tablet Take 325 mg by mouth once daily. at dinner    finasteride (PROSCAR) 5 mg tablet TAKE ONE TABLET BY MOUTH ONCE DAILY    galantamine (RAZADYNE) 8 MG Tab Take 8 mg by mouth every evening.     memantine (NAMENDA) 10 MG Tab Take 10 mg by mouth 2 (two) times daily.     metoprolol succinate (TOPROL-XL) 50 MG 24 hr tablet TAKE ONE TABLET BY MOUTH ONCE DAILY (Patient taking differently: TAKE ONE TABLET BY MOUTH ONCE DAILY IN THE MORNING)    omeprazole (PRILOSEC) 20 MG capsule TAKE ONE CAPSULE BY MOUTH TWICE DAILY (Patient taking differently: TAKE ONE CAPSULE BY MOUTH TWICE DAILY AS NEEDED)    rosuvastatin (CRESTOR) 5 MG tablet Take 1 tablet (5 mg total) by mouth every evening.    trospium (SANCTURA XR) 60 mg Cp24 capsule Take 60 mg by mouth every morning.     vortioxetine (TRINTELLIX) 10 mg Tab Take 10 mg by mouth once daily.    warfarin (COUMADIN) 5 MG tablet TAKE ONE TABLET BY MOUTH ONCE DAILY AT BEDTIME     cholecalciferol, vitamin D3, (VITAMIN D3) 1,000 unit capsule Take 1,000 Units by mouth once daily.    fish oil-omega-3 fatty acids 300-1,000 mg capsule Take 1 g by mouth once daily.    nitroGLYCERIN (NITROSTAT) 0.4 MG SL tablet Place 1 tablet (0.4 mg total) under the tongue every 5 (five) minutes as needed for Chest pain.    potassium citrate (UROCIT-K) 10 mEq (1,080 mg) TbSR Take 1 tablet (10 mEq total) by mouth 3 (three) times daily with meals.    QUEtiapine (SEROQUEL) 25 MG Tab Take 25 mg by mouth once daily. 1 hour prior to bed time    UBIDECARENONE (COENZYME Q10) 200 mg Tab Take 1 capsule by mouth once daily.      Family History     Problem Relation (Age of Onset)    Cancer Father    Diabetes Mother    Heart disease Mother (65)        Social History Main Topics    Smoking status: Never Smoker    Smokeless tobacco: Never Used    Alcohol use No    Drug use: No    Sexual activity: Not Currently     Review of Systems   Unable to perform ROS: Dementia     Objective:     Vital Signs (Most Recent):  Temp: 98.9 °F (37.2 °C) (01/08/18 0119)  Pulse: 61 (01/08/18 0200)  Resp: 18 (01/07/18 2127)  BP: (!) 153/70 (01/08/18 0119)  SpO2: 96 % (01/08/18 0119) Vital Signs (24h Range):  Temp:  [98.1 °F (36.7 °C)-99.1 °F (37.3 °C)] 98.9 °F (37.2 °C)  Pulse:  [61-72] 61  Resp:  [18] 18  SpO2:  [96 %-97 %] 96 %  BP: (121-153)/(63-70) 153/70     Weight: 81.6 kg (180 lb)  Body mass index is 25.1 kg/m².    Physical Exam   Constitutional: He is oriented to person, place, and time. He appears well-developed and well-nourished.   HENT:   Head: Normocephalic.   Eyes: Conjunctivae are normal.   Neck: Normal range of motion. Neck supple.   Cardiovascular: Regular rhythm, normal heart sounds and intact distal pulses.  Bradycardia present.    Pulses:       Radial pulses are 1+ on the right side, and 1+ on the left side.        Dorsalis pedis pulses are 1+ on the right side, and 1+ on the left side.   Pulmonary/Chest: Effort normal  and breath sounds normal.   Abdominal: Soft. He exhibits no distension. Bowel sounds are increased. There is tenderness in the suprapubic area.   Genitourinary: Penile tenderness present.   Genitourinary Comments: Penis is swollen, very tender.   Musculoskeletal: Normal range of motion.   Neurological: He is alert and oriented to person, place, and time.   Skin: Skin is warm and dry.   Psychiatric: He is slowed.           Significant Labs:   CBC:   Recent Labs  Lab 01/07/18  2219   WBC 9.87   HGB 12.2*   HCT 37.3*        CMP:   Recent Labs  Lab 01/07/18  2219      K 4.0      CO2 24   *   BUN 13   CREATININE 1.0   CALCIUM 10.3   PROT 6.2   ALBUMIN 3.1*   BILITOT 0.7   ALKPHOS 108   AST 16   ALT 14   ANIONGAP 8   EGFRNONAA >60       Significant Imaging: I have reviewed all pertinent imaging results/findings within the past 24 hours.    Assessment/Plan:     * Penile swelling    Patient received 1.8L subq infusion of fluid for dehydration. Penile swelling was noticed the next day.    Consult Urology          Dementia due to Parkinson's disease without behavioral disturbance    Stable    Continue galantamine, namenda          Chronic anticoagulation    Continue Coumadin  INR pending          Hypercholesterolemia    Lipid Panel pending  Continue Crestor          Type 2 diabetes mellitus without complication, without long-term current use of insulin    Diet controlled  A1c pending          Permanent atrial fibrillation    Chronic afib    Continue ASA, warfarin            VTE Risk Mitigation         Ordered     warfarin (COUMADIN) tablet 5 mg  Daily     Route:  Oral        01/08/18 0106     Place sequential compression device  Until discontinued      01/08/18 0106     Medium Risk of VTE  Once      01/08/18 0106     Reason for No Pharmacological VTE Prophylaxis  Once      01/08/18 0106     Place MYRTLE hose  Until discontinued      01/08/18 0106     Place sequential compression device  Until  discontinued      01/08/18 0106             Raul Valentin NP  Department of Hospital Medicine   [facility]

## 2018-01-08 NOTE — ED PROVIDER NOTES
"Encounter Date: 1/7/2018    SCRIBE #1 NOTE: I, Minh Farah, am scribing for, and in the presence of, Dr. Callejas.       History     Chief Complaint   Patient presents with    Groin Swelling     pt having penis swelling since 7 this evening.  no discharge noted, denies any mechanism of injury     9:45 PM    Patient is a 77 y.o. male who presents to the ED via EMS with complaint of penile swelling. Wife reports onset of fever yesterday. Per wife, the patient was given subcutaneous fluids through his abdomen last night by the nursing staff at [facility] for dehydration, which they believed to be the cause of the fever. She reports that he was given subcutaneous fluids today as well. Nursing staff noticed penile swelling when changing the patient's diaper this afternoon. Wife states "they said it was the fluid that went the wrong way." She notes that his diaper was wet when it was changed at 4 PM today, but not at 7 PM. Patient is unable to communicate secondary to baseline dementia, and wife states "he can't tell you anything... he never complains and he didn't make any faces as if he were in pain today."  Wife denies any fever today.        The history is provided by the spouse. The history is limited by the condition of the patient (dementia).     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 BMI 26.    Stricture and stenosis of esophagus 1/22/2014    Dilation 11/01, 12/13 Dr. Caldwell     Past Surgical History:   Procedure Laterality Date    CATARACT EXTRACTION, BILATERAL      HERNIA REPAIR      KNEE ARTHROSCOPY Right 1990    MITRAL VALVE REPLACEMENT  11/04    mechanical    Trigger finger right hand  1997     Family History   Problem Relation Age of Onset    Heart disease Mother 65     MI    Diabetes Mother     Cancer Father      Social History   Substance Use Topics    Smoking status: Never Smoker    Smokeless tobacco: Never Used    Alcohol use No     Review of Systems   Constitutional: Positive for fever (resolved).   HENT: Negative for rhinorrhea.    Respiratory: Negative for cough and shortness of breath.    Cardiovascular: Negative for chest pain and leg swelling.   Gastrointestinal: Negative for diarrhea and vomiting.   Genitourinary: Positive for penile swelling. Negative for scrotal swelling.   Musculoskeletal: Negative for joint swelling.   Skin: Negative for rash and wound.   Neurological: Negative for syncope.   Hematological: Does not bruise/bleed easily.   Psychiatric/Behavioral: Positive for confusion (hx dementia).       Physical Exam     Initial Vitals [01/07/18 2127]   BP Pulse Resp Temp SpO2   121/65 72 18 98.1 °F (36.7 °C) 97 %      MAP       83.67         Physical Exam    Nursing note and vitals reviewed.  Constitutional: He appears well-developed and well-nourished. No distress.   HENT:   Head: Normocephalic and atraumatic.   Eyes: Conjunctivae and EOM are normal.   Neck: Normal range of motion. Neck supple.   Cardiovascular: Normal rate and regular rhythm.   Mechanical click.   Pulmonary/Chest: Breath sounds normal. No respiratory distress. He has no wheezes. He has no rhonchi. He has no rales.   Abdominal: Soft. He exhibits no distension. There is no tenderness. There is no rebound and no guarding.   Genitourinary:   Genitourinary Comments:  Foreskin diffusely edematous, non-tender, no erythema. Penile meatus is normal. No scrotal swelling. (male chaperone present).   Musculoskeletal: Normal range of motion.   Neurological:   He responds inappropriately to orientation questions. No recall of recent events.   Skin: Skin is warm and dry.         ED Course   Procedures  Labs Reviewed   CBC W/ AUTO DIFFERENTIAL - Abnormal; Notable for the following:        Result Value    RBC 4.01 (*)     Hemoglobin 12.2 (*)     Hematocrit 37.3 (*)     Lymph% 17.0 (*)     All other components within normal limits   COMPREHENSIVE METABOLIC PANEL - Abnormal; Notable for the following:     Glucose 128 (*)     Albumin 3.1 (*)     All other components within normal limits   URINALYSIS - Abnormal; Notable for the following:     Appearance, UA Cloudy (*)     Protein, UA Trace (*)     Occult Blood UA 3+ (*)     Nitrite, UA Positive (*)     Urobilinogen, UA 4.0-6.0 (*)     Leukocytes, UA 3+ (*)     All other components within normal limits   URINALYSIS MICROSCOPIC - Abnormal; Notable for the following:     RBC, UA 50 (*)     WBC, UA >100 (*)     WBC Clumps, UA Many (*)     Bacteria, UA Many (*)     All other components within normal limits             Medical Decision Making:   Clinical Tests:   Lab Tests: Ordered and Reviewed  Radiological Study: Ordered and Reviewed  ED Management:  Demented patient presents with penile swelling of one day.  Recent subcutaneous fluids in his abdomen unclear if this is the cause or related.  Does have UTI on urinalysis today, unclear if this is a result of the swelling in introduction bacteria, or the cause.  We'll treat with antibiotics and admit.  Applying cold compresses and compression.    KEYONNA Callejas M.D.  01/08/2018  4:20 AM              Scribe Attestation:   Scribe #1: I performed the above scribed service and the documentation accurately describes the services I performed. I attest to the accuracy of the note.    Attending  Attestation:           Physician Attestation for Scribe:  Physician Attestation Statement for Scribe #1: I, Dr. Callejas, reviewed documentation, as scribed by Minh Farah in my presence, and it is both accurate and complete.                 ED Course      Clinical Impression:     1. Penile swelling                               Nehemias Callejas MD  01/08/18 0422

## 2018-01-08 NOTE — PLAN OF CARE
1318-Writer called [facility] SNF [phone] and spoke with Eileen from admissions. Per Eileen, [facility] would need clinicals and orders (SNF vs. [facility]PC) for patient to return upon D/C (sent through [facility] AND faxed [phone]). No PASSR or 142 will be needed for his return. Writer will request PT/OT eval if MD agrees.

## 2018-01-09 LAB
ALBUMIN SERPL BCP-MCNC: 2.7 G/DL
ALP SERPL-CCNC: 102 U/L
ALT SERPL W/O P-5'-P-CCNC: 11 U/L
ANION GAP SERPL CALC-SCNC: 4 MMOL/L
AST SERPL-CCNC: 13 U/L
BACTERIA #/AREA URNS HPF: ABNORMAL /HPF
BASOPHILS # BLD AUTO: 0.02 K/UL
BASOPHILS NFR BLD: 0.3 %
BILIRUB SERPL-MCNC: 0.6 MG/DL
BILIRUB UR QL STRIP: NEGATIVE
BUN SERPL-MCNC: 10 MG/DL
CALCIUM SERPL-MCNC: 9.7 MG/DL
CHLORIDE SERPL-SCNC: 109 MMOL/L
CLARITY UR: CLEAR
CO2 SERPL-SCNC: 23 MMOL/L
COLOR UR: YELLOW
CREAT SERPL-MCNC: 0.8 MG/DL
DIFFERENTIAL METHOD: ABNORMAL
EOSINOPHIL # BLD AUTO: 0.4 K/UL
EOSINOPHIL NFR BLD: 5.3 %
ERYTHROCYTE [DISTWIDTH] IN BLOOD BY AUTOMATED COUNT: 14.2 %
EST. GFR  (AFRICAN AMERICAN): >60 ML/MIN/1.73 M^2
EST. GFR  (NON AFRICAN AMERICAN): >60 ML/MIN/1.73 M^2
GLUCOSE SERPL-MCNC: 100 MG/DL
GLUCOSE UR QL STRIP: NEGATIVE
HCT VFR BLD AUTO: 36.9 %
HGB BLD-MCNC: 11.9 G/DL
HGB UR QL STRIP: ABNORMAL
INR PPP: 1.9
KETONES UR QL STRIP: NEGATIVE
LEUKOCYTE ESTERASE UR QL STRIP: ABNORMAL
LYMPHOCYTES # BLD AUTO: 1.4 K/UL
LYMPHOCYTES NFR BLD: 19.1 %
MAGNESIUM SERPL-MCNC: 1.8 MG/DL
MCH RBC QN AUTO: 29.6 PG
MCHC RBC AUTO-ENTMCNC: 32.2 G/DL
MCV RBC AUTO: 92 FL
MICROSCOPIC COMMENT: ABNORMAL
MONOCYTES # BLD AUTO: 0.8 K/UL
MONOCYTES NFR BLD: 10.9 %
NEUTROPHILS # BLD AUTO: 4.8 K/UL
NEUTROPHILS NFR BLD: 64.1 %
NITRITE UR QL STRIP: NEGATIVE
PH UR STRIP: 8 [PH] (ref 5–8)
PHOSPHATE SERPL-MCNC: 2.5 MG/DL
PLATELET # BLD AUTO: 167 K/UL
PMV BLD AUTO: 10.5 FL
POTASSIUM SERPL-SCNC: 3.8 MMOL/L
PROT SERPL-MCNC: 5.5 G/DL
PROT UR QL STRIP: NEGATIVE
PROTHROMBIN TIME: 20.5 SEC
RBC # BLD AUTO: 4.02 M/UL
RBC #/AREA URNS HPF: 7 /HPF (ref 0–4)
SODIUM SERPL-SCNC: 136 MMOL/L
SP GR UR STRIP: 1.01 (ref 1–1.03)
SQUAMOUS #/AREA URNS HPF: 6 /HPF
URN SPEC COLLECT METH UR: ABNORMAL
UROBILINOGEN UR STRIP-ACNC: 1 EU/DL
WBC # BLD AUTO: 7.53 K/UL
WBC #/AREA URNS HPF: >100 /HPF (ref 0–5)
WBC CLUMPS URNS QL MICRO: ABNORMAL

## 2018-01-09 PROCEDURE — 84100 ASSAY OF PHOSPHORUS: CPT

## 2018-01-09 PROCEDURE — 99900035 HC TECH TIME PER 15 MIN (STAT)

## 2018-01-09 PROCEDURE — 97530 THERAPEUTIC ACTIVITIES: CPT

## 2018-01-09 PROCEDURE — 99226 PR SUBSEQUENT OBSERVATION CARE,LEVEL III: CPT | Mod: ,,, | Performed by: HOSPITALIST

## 2018-01-09 PROCEDURE — 85025 COMPLETE CBC W/AUTO DIFF WBC: CPT

## 2018-01-09 PROCEDURE — G8987 SELF CARE CURRENT STATUS: HCPCS | Mod: CN

## 2018-01-09 PROCEDURE — 25000003 PHARM REV CODE 250: Performed by: NURSE PRACTITIONER

## 2018-01-09 PROCEDURE — 36415 COLL VENOUS BLD VENIPUNCTURE: CPT

## 2018-01-09 PROCEDURE — 81000 URINALYSIS NONAUTO W/SCOPE: CPT

## 2018-01-09 PROCEDURE — G0378 HOSPITAL OBSERVATION PER HR: HCPCS

## 2018-01-09 PROCEDURE — 63600175 PHARM REV CODE 636 W HCPCS: Performed by: HOSPITALIST

## 2018-01-09 PROCEDURE — 87086 URINE CULTURE/COLONY COUNT: CPT

## 2018-01-09 PROCEDURE — 80053 COMPREHEN METABOLIC PANEL: CPT

## 2018-01-09 PROCEDURE — 83735 ASSAY OF MAGNESIUM: CPT

## 2018-01-09 PROCEDURE — 97166 OT EVAL MOD COMPLEX 45 MIN: CPT

## 2018-01-09 PROCEDURE — G8988 SELF CARE GOAL STATUS: HCPCS | Mod: CM

## 2018-01-09 PROCEDURE — 85610 PROTHROMBIN TIME: CPT

## 2018-01-09 PROCEDURE — 94761 N-INVAS EAR/PLS OXIMETRY MLT: CPT

## 2018-01-09 PROCEDURE — 97110 THERAPEUTIC EXERCISES: CPT

## 2018-01-09 RX ADMIN — FERROUS SULFATE TAB EC 325 MG (65 MG FE EQUIVALENT) 325 MG: 325 (65 FE) TABLET DELAYED RESPONSE at 09:01

## 2018-01-09 RX ADMIN — POTASSIUM CITRATE 10 MEQ: 5 TABLET ORAL at 09:01

## 2018-01-09 RX ADMIN — MEMANTINE HYDROCHLORIDE 10 MG: 10 TABLET ORAL at 09:01

## 2018-01-09 RX ADMIN — WARFARIN SODIUM 5 MG: 5 TABLET ORAL at 05:01

## 2018-01-09 RX ADMIN — VITAMIN D, TAB 1000IU (100/BT) 1000 UNITS: 25 TAB at 09:01

## 2018-01-09 RX ADMIN — OMEGA-3 FATTY ACIDS CAP DELAYED RELEASE 1000 MG 1 CAPSULE: 1000 CAPSULE DELAYED RELEASE at 09:01

## 2018-01-09 RX ADMIN — POTASSIUM CITRATE 10 MEQ: 5 TABLET ORAL at 05:01

## 2018-01-09 RX ADMIN — POTASSIUM CITRATE 10 MEQ: 5 TABLET ORAL at 01:01

## 2018-01-09 RX ADMIN — ROSUVASTATIN CALCIUM 5 MG: 5 TABLET ORAL at 09:01

## 2018-01-09 RX ADMIN — ASPIRIN 81 MG: 81 TABLET, COATED ORAL at 09:01

## 2018-01-09 RX ADMIN — QUETIAPINE FUMARATE 25 MG: 25 TABLET, FILM COATED ORAL at 09:01

## 2018-01-09 RX ADMIN — METOPROLOL SUCCINATE 50 MG: 50 TABLET, EXTENDED RELEASE ORAL at 09:01

## 2018-01-09 RX ADMIN — PANTOPRAZOLE SODIUM 40 MG: 40 TABLET, DELAYED RELEASE ORAL at 09:01

## 2018-01-09 RX ADMIN — AMLODIPINE BESYLATE 2.5 MG: 2.5 TABLET ORAL at 09:01

## 2018-01-09 RX ADMIN — CEFTRIAXONE SODIUM 1 G: 1 INJECTION, POWDER, FOR SOLUTION INTRAMUSCULAR; INTRAVENOUS at 05:01

## 2018-01-09 RX ADMIN — FINASTERIDE 5 MG: 5 TABLET, FILM COATED ORAL at 09:01

## 2018-01-09 NOTE — ASSESSMENT & PLAN NOTE
Chronic afib  Continue ASA, warfarin  Per cardiology note 12/8/17:  2004: Began warfarin. Anticoagulation managed by Dr. Gamino.               Indication: Mechanical mitral valve and atrial fibrillation              Target INR 2.5-3.0              On warfarin and aspirin 81 mg Q24.

## 2018-01-09 NOTE — ASSESSMENT & PLAN NOTE
Patient received 1.8L subq infusion of fluid for dehydration. Penile swelling was noticed the next day.  - Urology saw patient  - Appears resolved on exam.

## 2018-01-09 NOTE — ASSESSMENT & PLAN NOTE
- UA appears consistent with infection  - Urine culture pending.   - Empiric Rocephin day 2  - Consider repeat UA in AM to ensure resolution.

## 2018-01-09 NOTE — PLAN OF CARE
01/09/18 1313   STEVENSON Message   Medicare Outpatient and Observation Notification regarding financial responsibility Other (comments)  (pt unable, family not available)   Date STEVENSON was signed 01/08/18   Time STEVENSON was signed 0800

## 2018-01-09 NOTE — SUBJECTIVE & OBJECTIVE
Interval History:  Pt new to me.  No complaints.  Arouses from sleep.  Discussed with family.    Review of Systems   Unable to perform ROS: Dementia     Objective:     Vital Signs (Most Recent):  Temp: 99 °F (37.2 °C) (01/09/18 1500)  Pulse: 82 (01/09/18 1500)  Resp: 16 (01/09/18 1500)  BP: (!) 154/72 (01/09/18 1500)  SpO2: (!) 92 % (01/09/18 1500) Vital Signs (24h Range):  Temp:  [96.8 °F (36 °C)-99 °F (37.2 °C)] 99 °F (37.2 °C)  Pulse:  [67-95] 82  Resp:  [16-20] 16  SpO2:  [92 %-100 %] 92 %  BP: (137-176)/(63-87) 154/72     Weight: 81.6 kg (180 lb)  Body mass index is 25.1 kg/m².    Intake/Output Summary (Last 24 hours) at 01/09/18 1654  Last data filed at 01/09/18 0400   Gross per 24 hour   Intake          1027.26 ml   Output                0 ml   Net          1027.26 ml      Physical Exam   Constitutional: He appears well-developed and well-nourished.   HENT:   Head: Normocephalic and atraumatic.   Eyes: Conjunctivae are normal. Pupils are equal, round, and reactive to light.   Neck: Normal range of motion. Neck supple.   Cardiovascular: Normal rate and regular rhythm.    Pulmonary/Chest: Effort normal and breath sounds normal.   Abdominal: Soft.   Genitourinary:   Genitourinary Comments: Penile edema appears resolving.      Neurological: He is alert.   Drowsy, arouses.     Skin: Skin is warm and dry.       Significant Labs:   BMP:   Recent Labs  Lab 01/09/18  0457         K 3.8      CO2 23   BUN 10   CREATININE 0.8   CALCIUM 9.7   MG 1.8     CBC:   Recent Labs  Lab 01/07/18  2219 01/08/18  0433 01/09/18  0457   WBC 9.87 9.47 7.53   HGB 12.2* 11.9* 11.9*   HCT 37.3* 36.3* 36.9*    160 167       Significant Imaging: I have reviewed all pertinent imaging results/findings within the past 24 hours.   Imaging Results    None

## 2018-01-09 NOTE — PLAN OF CARE
Problem: Occupational Therapy Goal  Goal: Occupational Therapy Goal  Goals to be met by: 1/16/2018    Patient will increase functional independence with ADLs by performing:    Feeding with Maximum Assistance.  Grooming while seated with Maximum Assistance.  Stand pivot transfers with Moderate Assistance.  Upper extremity exercise program x10 reps per handout, with assistance as needed.    Outcome: Ongoing (interventions implemented as appropriate)  Pt. Would benefit from continue OT to maximize indep to reduce burden of care of care givers and reduce fall risk.  Continue with OT POC.

## 2018-01-09 NOTE — ASSESSMENT & PLAN NOTE
- Continue Crestor    Results for TO CHAMBERS (MRN [id number]) as of 1/9/2018 16:47   Ref. Range 1/8/2018 04:32   Cholesterol Latest Ref Range: 120 - 199 mg/dL 97 (L)   HDL Latest Ref Range: 40 - 75 mg/dL 19 (L)   LDL Cholesterol Latest Ref Range: 63.0 - 159.0 mg/dL 54.0 (L)   Total Cholesterol/HDL Ratio Latest Ref Range: 2.0 - 5.0  5.1 (H)   Triglycerides Latest Ref Range: 30 - 150 mg/dL 120

## 2018-01-09 NOTE — PT/OT/SLP EVAL
Occupational Therapy   Evaluation    Name: Rikki Morrison  MRN: [id number]  Admitting Diagnosis:  Penile swelling      Recommendations:     Discharge Recommendations: nursing facility, skilled  Discharge Equipment Recommendations:  none  Barriers to discharge:  None    History:     Occupational Profile:  Living Environment: pr. Admitted from skilled; been in SNF ~ 1month ; prior to that  Pt lived with wife in 1 story house with 1 SENA.  Has shower stall with SC and suction cup grab bars  .    Previous level of function: wc mostly; working with therapy on ambulation in SNF ; assisted with all ADLS; incontinent B&B. Prior to a month ago, (after fall ) pt ambulated  just a few steps with RW and wife using wc to get pt to car. Prior to fall, patients level of function was able to transfer and  amb without AD with Supervision for safety only; supervision for basic ADLS.  Wife had begun to assist with bathing recently and using diapers.      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 BMI 26.    Stricture and stenosis of esophagus 1/22/2014    Dilation 11/01, 12/13 Dr. Caldwell       Past Surgical History:   Procedure Laterality Date    CATARACT  EXTRACTION, BILATERAL      HERNIA REPAIR      KNEE ARTHROSCOPY Right 1990    MITRAL VALVE REPLACEMENT  11/04    mechanical    Trigger finger right hand  1997       Subjective     Chief Complaint: none  Patient/Family stated goals: back to SNF per daughter  Communicated with: nurse prior to session.  Pain/Comfort:  · Pain Rating 1: 0/10  · Pain Rating Post-Intervention 1: 0/10    Objective:     Patient found with: bed alarm, Condom Catheter, peripheral IV    General Precautions: Standard, fall, hearing impaired   Orthopedic Precautions:N/A   Braces: N/A     Occupational Performance:    Bed Mobility:  Mod vc, very ataxic, impaired following commands  · Patient completed Rolling/Turning to Left with  minimum assistance and with side rail  · Patient completed Rolling/Turning to Right with minimum assistance and with side rail  · Patient completed Scooting/Bridging with minimum assistance  · Patient completed Supine to Sit with moderate assistance  · Patient completed Sit to Supine with moderate assistance    Functional Mobility/Transfers:  · Patient completed Sit <> Stand Transfer with moderate assistance  with  no assistive device -LUE support, R hand on bed rail and blocked L foot from sliding forward; pt very ataxic and trunk flexed forward; max vc for upright posture but unable to follow through with commands to correct posture    Activities of Daily Living:  · Feeding:  total assistance in bed   · Grooming: total assistance [place] to wash face ; poor following commands; perseverated on wiping mouth despite vc to do nose, eyes  · LB Dressing: dependence bed level for safety 2/2 poor sitting balance -losing balance to R ; retopulsion of trunk and feet lifting off floor despite manual assist to force WB to increase proprioceptive input  · Toileting: dependence incontinent B&B in bed-no alert to therapist that he had to have BM; had BM in bed and cleaned up then another BM when stood.    Cognitive/Visual  Perceptual:  Cognitive/Psychosocial Skills:     -       Oriented to: Person   -       Follows Commands/attention:Inattentive; followed simple  Command 25% on time  -       Communication: dysarthria  -       Memory: Impaired STM, Impaired LTM and Poor immediate recall  -       Safety awareness/insight to disability: impaired   -       Mood/Affect/Coping skills/emotional control: Flat affect  Visual/Perceptual:      -Intact    Physical Exam:  Balance:    -       static siting fair-; dynamic poor; static standing;  poor -ataxic LB; rigidity; forward flexed trunk;  Postural examination/scapula alignment:    -       Rounded shoulders  -       Forward head  -       Posterior pelvic tilt  -       Abnormal trunk flexion  Skin integrity: Visible skin intact and Bruising of L inner thigh  Edema:  None noted  Sensation:    -       Intact  Motor Planning:    -       impaired  Dominant hand:    -       right  Upper Extremity Range of Motion:     -       Right Upper Extremity: WFL  -       Left Upper Extremity: WFL  Upper Extremity Strength: NT formally 2/2 poor following instructions;    Strength:    -       Right Upper Extremity: WFL  -       Left Upper Extremity: WFL  Fine Motor Coordination:    -       Impaired  Left hand, manipulation of objects mod and Right hand, manipulation of objects mod tremors  Gross motor coordination:   ataxia  Neurological:    -       rigid    Patient left supine with all lines intact, call button in reach, bed alarm on, nurse and PCT  notified and dauighter present    [facility] 6 Click:  AMPA Total Score: 7    Treatment & Education:  Bed mobility rolling CGA-min assist; rigidity; ataxic; supine<>sit mod assist x 2 trails; sit<>stand  Mod assist x 2 trials; AAROM BUE; [place] to wash face total assist.   Education:    Assessment:     Rikki Morrison is a 77 y.o. male with a medical diagnosis of Penile swelling.  He presents with poor following commands and high fall risk. Pt. would benefit from  "continue OT to maximize indep to reduce burden of care of care givers and reduce fall risk.  Continue with OT POC. Performance deficits affecting function are weakness, impaired functional mobilty, impaired balance, impaired cognition, decreased upper extremity function, decreased lower extremity function, decreased safety awareness, abnormal tone, impaired coordination, gait instability, impaired self care skills, impaired endurance.      Rehab Prognosis:  fair; patient would benefit from acute skilled OT services to address these deficits and reach maximum level of function.         Clinical Decision Makin.  OT Mod:  "Pt evaluation falls under moderate complexity for evaluation coding due to identification of 3-5 performance deficits noted as stated above. Eval required Min/Mod assistance to complete on this date and detailed assessment(s) were utilized. Moreover, an expanded review of history and occupational profile obtained with additional review of cognitive, physical and psychosocial hx."     Plan:     Patient to be seen 5 x/week to address the above listed problems via self-care/home management, therapeutic activities, therapeutic exercises  · Plan of Care Expires: 18  · Plan of Care Reviewed with: patient, daughter    This Plan of care has been discussed with the patient who was involved in its development and understands and is in agreement with the identified goals and treatment plan    GOALS:    Occupational Therapy Goals        Problem: Occupational Therapy Goal    Goal Priority Disciplines Outcome Interventions   Occupational Therapy Goal     OT, PT/OT Ongoing (interventions implemented as appropriate)    Description:  Goals to be met by: 2018    Patient will increase functional independence with ADLs by performing:    Feeding with Maximum Assistance.  Grooming while seated with Maximum Assistance.  Stand pivot transfers with Moderate Assistance.  Upper extremity exercise program x10 " reps per handout, with assistance as needed.                      Time Tracking:     OT Date of Treatment: 01/09/18  OT Start Time: 1232  OT Stop Time: 1256  OT Total Time (min): 24 min    Billable Minutes:Evaluation 15  Therapeutic Exercise 9  Total Time 24    Alia Szymanski OT  1/9/2018

## 2018-01-09 NOTE — PROGRESS NOTES
"[facility] Hospital Medicine  Progress Note    Patient Name: Rikki Morrison  MRN: [id number]  Patient Class: OP- Observation   Admission Date: 1/7/2018  Length of Stay: 0 days  Attending Physician: Cyril Huerta MD  Primary Care Provider: ODALYS Villafuerte MD        Subjective:     Principal Problem:Penile swelling    HPI:  Patient is a 77 y.o. male who presents to the ED via EMS with complaint of penile swelling. Wife reports onset of fever yesterday. Per wife, the patient was given subcutaneous fluids through his abdomen last night by the nursing staff at [facility] for dehydration, which they believed to be the cause of the fever. She reports that he was given subcutaneous fluids today as well. Nursing staff noticed penile swelling when changing the patient's diaper this afternoon. Wife states "they said it was the fluid that went the wrong way." She notes that his diaper was wet when it was changed at 4 PM today, but not at 7 PM. Patient is unable to communicate secondary to baseline dementia, and wife states "he can't tell you anything... he never complains and he didn't make any faces as if he were in pain today."  Wife denies any fever today.       Hospital Course:  No notes on file    Interval History:  Pt new to me.  No complaints.  Arouses from sleep.  Discussed with family.    Review of Systems   Unable to perform ROS: Dementia     Objective:     Vital Signs (Most Recent):  Temp: 99 °F (37.2 °C) (01/09/18 1500)  Pulse: 82 (01/09/18 1500)  Resp: 16 (01/09/18 1500)  BP: (!) 154/72 (01/09/18 1500)  SpO2: (!) 92 % (01/09/18 1500) Vital Signs (24h Range):  Temp:  [96.8 °F (36 °C)-99 °F (37.2 °C)] 99 °F (37.2 °C)  Pulse:  [67-95] 82  Resp:  [16-20] 16  SpO2:  [92 %-100 %] 92 %  BP: (137-176)/(63-87) 154/72     Weight: 81.6 kg (180 lb)  Body mass index is 25.1 kg/m².    Intake/Output Summary (Last 24 hours) at 01/09/18 4826  Last data filed at 01/09/18 0400   Gross per 24 hour   Intake  "         1027.26 ml   Output                0 ml   Net          1027.26 ml      Physical Exam   Constitutional: He appears well-developed and well-nourished.   HENT:   Head: Normocephalic and atraumatic.   Eyes: Conjunctivae are normal. Pupils are equal, round, and reactive to light.   Neck: Normal range of motion. Neck supple.   Cardiovascular: Normal rate and irregularly regular rhythm.    Pulmonary/Chest: Effort normal and breath sounds normal.   Abdominal: Soft.   Genitourinary:   Genitourinary Comments: Penile edema appears resolving.      Neurological: He is alert.   Drowsy, arouses.     Skin: Skin is warm and dry.       Significant Labs:   BMP:   Recent Labs  Lab 01/09/18  0457         K 3.8      CO2 23   BUN 10   CREATININE 0.8   CALCIUM 9.7   MG 1.8     CBC:   Recent Labs  Lab 01/07/18  2219 01/08/18  0433 01/09/18  0457   WBC 9.87 9.47 7.53   HGB 12.2* 11.9* 11.9*   HCT 37.3* 36.3* 36.9*    160 167       Significant Imaging: I have reviewed all pertinent imaging results/findings within the past 24 hours.   Imaging Results    None       Assessment/Plan:      * Penile swelling    Patient received 1.8L subq infusion of fluid for dehydration. Penile swelling was noticed the next day.  - Urology saw patient  - Appears resolved on exam.             Urinary tract infection without hematuria    - UA appears consistent with infection  - Urine culture pending.   - Empiric Rocephin day 2  - Consider repeat UA in AM to ensure resolution.             Dementia due to Parkinson's disease without behavioral disturbance    Stable    Continue galantamine, namenda          Chronic anticoagulation    - Continue Coumadin  - INR 1.9          Hypercholesterolemia    - Continue Crestor    Results for TO CHAMBERS (MRN [id number]) as of 1/9/2018 16:47   Ref. Range 1/8/2018 04:32   Cholesterol Latest Ref Range: 120 - 199 mg/dL 97 (L)   HDL Latest Ref Range: 40 - 75 mg/dL 19 (L)   LDL Cholesterol Latest  Ref Range: 63.0 - 159.0 mg/dL 54.0 (L)   Total Cholesterol/HDL Ratio Latest Ref Range: 2.0 - 5.0  5.1 (H)   Triglycerides Latest Ref Range: 30 - 150 mg/dL 120           Type 2 diabetes mellitus without complication, without long-term current use of insulin    Diet controlled  A1c = 5.8          Permanent atrial fibrillation    Chronic afib  Continue ASA, warfarin  Per cardiology note 12/8/17:  2004: Began warfarin. Anticoagulation managed by Dr. Gamino.               Indication: Mechanical mitral valve and atrial fibrillation              Target INR 2.5-3.0              On warfarin and aspirin 81 mg Q24.            VTE Risk Mitigation         Ordered     Place sequential compression device  Until discontinued      01/09/18 1427     warfarin (COUMADIN) tablet 5 mg  Daily     Route:  Oral        01/08/18 0106     Medium Risk of VTE  Once      01/08/18 0106     Reason for No Pharmacological VTE Prophylaxis  Once      01/08/18 0106     Place MYRTLE hose  Until discontinued      01/08/18 0106              Cyril Huerta MD  Department of Hospital Medicine   [facility]

## 2018-01-09 NOTE — PT/OT/SLP PROGRESS
Physical Therapy Treatment    Patient Name:  Rikki Morrison   MRN:  [id number]    Recommendations:     Discharge Recommendations:   (return to SNF)   Discharge Equipment Recommendations:  (TBD pending progress)   Barriers to discharge: Decreased caregiver support    Assessment:     Rikki Morrison is a 77 y.o. male admitted with a medical diagnosis of Penile swelling.  He presents with the following impairments/functional limitations:  weakness, impaired endurance, impaired self care skills, impaired functional mobilty, gait instability, impaired balance, impaired cognition, decreased coordination, decreased lower extremity function, decreased safety awareness ; pt with fair mobility today, maxA x 1-2 for sit to stand t/f's, not following commands well.    Rehab Prognosis:  fair; patient would benefit from acute skilled PT services to address these deficits and reach maximum level of function.      Recent Surgery: * No surgery found *      Plan:     During this hospitalization, patient to be seen 6 x/week to address the above listed problems via gait training, therapeutic activities, therapeutic exercises, neuromuscular re-education  · Plan of Care Expires:  02/07/18   Plan of Care Reviewed with: patient    Subjective     Communicated with nurse prior to session.  Patient found in bed,HOB up high, daughter trying to feed pt lunch, though pt coughing a lot, upon PT entry to room, agreeable to treatment.      Chief Complaint: pt did not voice c/o's  Patient comments/goals: pt did not speak during session, keeping eyes closed a lot during session  Pain/Comfort:  · Pain Rating 1:  (pt did not appear to be in pain, did not rate/unable to rate)  · Pain Rating Post-Intervention 1:  (pt did not appear to be in pain following session)    Patients cultural, spiritual, Oriental orthodox conflicts given the current situation: n/a, daughter and son both present for tx    Objective:     Patient found with: Condom Catheter, peripheral  IV     General Precautions: Standard, fall   Orthopedic Precautions:N/A   Braces: N/A     Functional Mobility:  · Bed Mobility:     · Supine to Sit: moderate assistance, maximal assistance and of 1 persons  · Transfers:     · Sit to Stand:  maximal assistance and of 1-2 persons with no AD and rolling walker      AM-PAC 6 CLICK MOBILITY  Turning over in bed (including adjusting bedclothes, sheets and blankets)?: 3  Sitting down on and standing up from a chair with arms (e.g., wheelchair, bedside commode, etc.): 2  Moving from lying on back to sitting on the side of the bed?: 2  Moving to and from a bed to a chair (including a wheelchair)?: 2  Need to walk in hospital room?: 1  Climbing 3-5 steps with a railing?: 1  Total Score: 11       Therapeutic Activities and Exercises:   pt sat up EOB ~20 min. Total, coughing up some mucous at first, then no coughing noted for rest of session. Pt. Needing close SBA to occ min.A for static sitting balance, leaning to Rt side and posteriorly, despite cueing. Attempted sit to stand x 3 -4 trials, with and without RW, pt unable to achieve full stand, forward flexed trunk with extension of extremities.     Patient left HOB elevated with all lines intact, call button in reach, bed alarm on, nurse notified and son and daughter present..    GOALS:    Physical Therapy Goals        Problem: Physical Therapy Goal    Goal Priority Disciplines Outcome Goal Variances Interventions   Physical Therapy Goal     PT/OT, PT Ongoing (interventions implemented as appropriate)     Description:  Goals to be met by: 18     Patient will increase functional independence with mobility by performin. Supine to sit with Min A.   2. Sit to supine with min A.   3. Sit<>stand transfer with Min A using rolling walker.   4. Gait > 50 feet with Min A using rolling walker.   5. SPT from EOB < chair with or without AD and min A                    Time Tracking:     PT Received On: 18  PT Start Time:  1332     PT Stop Time: 1401  PT Total Time (min): 29 min     Billable Minutes: Therapeutic Activity 29    Treatment Type: Treatment  PT/PTA: PTA     PTA Visit Number: 1     Leila Mackay PTA  01/09/2018

## 2018-01-09 NOTE — PLAN OF CARE
Problem: Physical Therapy Goal  Goal: Physical Therapy Goal  Goals to be met by: 18     Patient will increase functional independence with mobility by performin. Supine to sit with Min A.   2. Sit to supine with min A.   3. Sit<>stand transfer with Min A using rolling walker.   4. Gait > 50 feet with Min A using rolling walker.   5. SPT from EOB < chair with or without AD and min A   Pt with fair progression towards goals today, sup to sit mod/maxA x 1, attempted sit to stand x 4 trials with max.A x 1-2 persons, with RW and with HHA (son present and assisting as well). Pt unable to follow commands well , keeping eyes closed throughout tx session, despite constant cueing. Return to SNF.

## 2018-01-10 PROBLEM — Z95.2 H/O MITRAL VALVE REPLACEMENT WITH MECHANICAL VALVE: Status: ACTIVE | Noted: 2018-01-10

## 2018-01-10 LAB
ALBUMIN SERPL BCP-MCNC: 2.6 G/DL
ALP SERPL-CCNC: 93 U/L
ALT SERPL W/O P-5'-P-CCNC: 11 U/L
ANION GAP SERPL CALC-SCNC: 6 MMOL/L
AST SERPL-CCNC: 10 U/L
BACTERIA UR CULT: NORMAL
BASOPHILS # BLD AUTO: 0.02 K/UL
BASOPHILS NFR BLD: 0.3 %
BILIRUB SERPL-MCNC: 0.5 MG/DL
BUN SERPL-MCNC: 7 MG/DL
CALCIUM SERPL-MCNC: 9.7 MG/DL
CHLORIDE SERPL-SCNC: 111 MMOL/L
CO2 SERPL-SCNC: 22 MMOL/L
CREAT SERPL-MCNC: 0.8 MG/DL
DIFFERENTIAL METHOD: ABNORMAL
EOSINOPHIL # BLD AUTO: 0.6 K/UL
EOSINOPHIL NFR BLD: 7.7 %
ERYTHROCYTE [DISTWIDTH] IN BLOOD BY AUTOMATED COUNT: 14 %
EST. GFR  (AFRICAN AMERICAN): >60 ML/MIN/1.73 M^2
EST. GFR  (NON AFRICAN AMERICAN): >60 ML/MIN/1.73 M^2
GLUCOSE SERPL-MCNC: 112 MG/DL
HCT VFR BLD AUTO: 35.3 %
HGB BLD-MCNC: 11.5 G/DL
INR PPP: 2.1
LYMPHOCYTES # BLD AUTO: 2.2 K/UL
LYMPHOCYTES NFR BLD: 29.5 %
MAGNESIUM SERPL-MCNC: 1.9 MG/DL
MCH RBC QN AUTO: 29.7 PG
MCHC RBC AUTO-ENTMCNC: 32.6 G/DL
MCV RBC AUTO: 91 FL
MONOCYTES # BLD AUTO: 0.8 K/UL
MONOCYTES NFR BLD: 11.1 %
NEUTROPHILS # BLD AUTO: 3.9 K/UL
NEUTROPHILS NFR BLD: 51.3 %
PHOSPHATE SERPL-MCNC: 2.6 MG/DL
PLATELET # BLD AUTO: 182 K/UL
PMV BLD AUTO: 9.8 FL
POTASSIUM SERPL-SCNC: 3.8 MMOL/L
PROT SERPL-MCNC: 5.3 G/DL
PROTHROMBIN TIME: 22.8 SEC
RBC # BLD AUTO: 3.87 M/UL
SODIUM SERPL-SCNC: 139 MMOL/L
WBC # BLD AUTO: 7.56 K/UL

## 2018-01-10 PROCEDURE — 80053 COMPREHEN METABOLIC PANEL: CPT

## 2018-01-10 PROCEDURE — 85610 PROTHROMBIN TIME: CPT

## 2018-01-10 PROCEDURE — 97535 SELF CARE MNGMENT TRAINING: CPT | Mod: 59

## 2018-01-10 PROCEDURE — 84100 ASSAY OF PHOSPHORUS: CPT

## 2018-01-10 PROCEDURE — 25000003 PHARM REV CODE 250: Performed by: HOSPITALIST

## 2018-01-10 PROCEDURE — 36415 COLL VENOUS BLD VENIPUNCTURE: CPT

## 2018-01-10 PROCEDURE — 83735 ASSAY OF MAGNESIUM: CPT

## 2018-01-10 PROCEDURE — 85025 COMPLETE CBC W/AUTO DIFF WBC: CPT

## 2018-01-10 PROCEDURE — 25000003 PHARM REV CODE 250: Performed by: NURSE PRACTITIONER

## 2018-01-10 PROCEDURE — 99226 PR SUBSEQUENT OBSERVATION CARE,LEVEL III: CPT | Mod: ,,, | Performed by: HOSPITALIST

## 2018-01-10 PROCEDURE — 63600175 PHARM REV CODE 636 W HCPCS: Performed by: HOSPITALIST

## 2018-01-10 PROCEDURE — 97530 THERAPEUTIC ACTIVITIES: CPT

## 2018-01-10 PROCEDURE — G0378 HOSPITAL OBSERVATION PER HR: HCPCS

## 2018-01-10 RX ORDER — CIPROFLOXACIN 500 MG/1
500 TABLET ORAL EVERY 12 HOURS
Status: DISCONTINUED | OUTPATIENT
Start: 2018-01-10 | End: 2018-01-12 | Stop reason: HOSPADM

## 2018-01-10 RX ORDER — CIPROFLOXACIN 500 MG/1
500 TABLET ORAL EVERY 12 HOURS
Start: 2018-01-10 | End: 2018-01-13

## 2018-01-10 RX ADMIN — POTASSIUM CITRATE 10 MEQ: 5 TABLET ORAL at 08:01

## 2018-01-10 RX ADMIN — POTASSIUM CITRATE 10 MEQ: 5 TABLET ORAL at 04:01

## 2018-01-10 RX ADMIN — GALANTAMINE 8 MG: 8 TABLET, FILM COATED ORAL at 03:01

## 2018-01-10 RX ADMIN — OMEGA-3 FATTY ACIDS CAP DELAYED RELEASE 1000 MG 1 CAPSULE: 1000 CAPSULE DELAYED RELEASE at 08:01

## 2018-01-10 RX ADMIN — MEMANTINE HYDROCHLORIDE 10 MG: 10 TABLET ORAL at 08:01

## 2018-01-10 RX ADMIN — CIPROFLOXACIN 500 MG: 500 TABLET, FILM COATED ORAL at 08:01

## 2018-01-10 RX ADMIN — WARFARIN SODIUM 5 MG: 5 TABLET ORAL at 04:01

## 2018-01-10 RX ADMIN — SODIUM CHLORIDE: 0.9 INJECTION, SOLUTION INTRAVENOUS at 11:01

## 2018-01-10 RX ADMIN — VITAMIN D, TAB 1000IU (100/BT) 1000 UNITS: 25 TAB at 08:01

## 2018-01-10 RX ADMIN — METOPROLOL SUCCINATE 50 MG: 50 TABLET, EXTENDED RELEASE ORAL at 06:01

## 2018-01-10 RX ADMIN — FINASTERIDE 5 MG: 5 TABLET, FILM COATED ORAL at 09:01

## 2018-01-10 RX ADMIN — ROSUVASTATIN CALCIUM 5 MG: 5 TABLET ORAL at 08:01

## 2018-01-10 RX ADMIN — POTASSIUM CITRATE 10 MEQ: 5 TABLET ORAL at 11:01

## 2018-01-10 RX ADMIN — ASPIRIN 81 MG: 81 TABLET, COATED ORAL at 08:01

## 2018-01-10 RX ADMIN — QUETIAPINE FUMARATE 25 MG: 25 TABLET, FILM COATED ORAL at 08:01

## 2018-01-10 RX ADMIN — AMLODIPINE BESYLATE 2.5 MG: 2.5 TABLET ORAL at 08:01

## 2018-01-10 RX ADMIN — FERROUS SULFATE TAB EC 325 MG (65 MG FE EQUIVALENT) 325 MG: 325 (65 FE) TABLET DELAYED RESPONSE at 08:01

## 2018-01-10 RX ADMIN — PANTOPRAZOLE SODIUM 40 MG: 40 TABLET, DELAYED RELEASE ORAL at 08:01

## 2018-01-10 RX ADMIN — CEFTRIAXONE SODIUM 1 G: 1 INJECTION, POWDER, FOR SOLUTION INTRAMUSCULAR; INTRAVENOUS at 08:01

## 2018-01-10 NOTE — PLAN OF CARE
Problem: Patient Care Overview  Goal: Plan of Care Review  Outcome: Ongoing (interventions implemented as appropriate)  Pt in no distress on Ra, prn tx not required. Will continue to monitor.

## 2018-01-10 NOTE — ASSESSMENT & PLAN NOTE
- UA appears consistent with infection  - Urine culture no significant growth.   - Empiric Rocephin day 3  - UA shows some improvement.  - Given takes coumadin and potential for drug interaction, will consider another 3 days Cipro empirically as outpatient.

## 2018-01-10 NOTE — ASSESSMENT & PLAN NOTE
- Continue Coumadin  - INR 1.9 > 2.1 here in hospital on coumadin 5 mg daily.    - Monitor INR as coumadin dose may need adjusting as noted.

## 2018-01-10 NOTE — ASSESSMENT & PLAN NOTE
- Continue Crestor    Results for TO CHAMBERS (MRN [id number]) as of 1/9/2018 16:47   Ref. Range 1/8/2018 04:32   Cholesterol Latest Ref Range: 120 - 199 mg/dL 97 (L)   HDL Latest Ref Range: 40 - 75 mg/dL 19 (L)   LDL Cholesterol Latest Ref Range: 63.0 - 159.0 mg/dL 54.0 (L)   Total Cholesterol/HDL Ratio Latest Ref Range: 2.0 - 5.0  5.1 (H)   Triglycerides Latest Ref Range: 30 - 150 mg/dL 120

## 2018-01-10 NOTE — PT/OT/SLP PROGRESS
Physical Therapy Treatment    Patient Name:  Rikki Morrison   MRN:  [id number]    Recommendations:     Discharge Recommendations:  nursing facility, skilled   Discharge Equipment Recommendations:  (TBD)   Barriers to discharge: Decreased caregiver support    Assessment:     Rikki Morrison is a 77 y.o. male admitted with a medical diagnosis of Penile swelling.  He presents with the following impairments/functional limitations:  weakness, impaired endurance, impaired self care skills, impaired functional mobilty, gait instability, impaired balance, impaired cognition, decreased coordination, decreased safety awareness, decreased lower extremity function, decreased upper extremity function, abnormal tone, impaired coordination ; pt with poor mobility today, needing max.A x 2 for partial standing act's. Pt not following commands well, able to follow 1 step commands ~50% of time.    Rehab Prognosis:  fair; patient would benefit from acute skilled PT services to address these deficits and reach maximum level of function.      Recent Surgery: * No surgery found *      Plan:     During this hospitalization, patient to be seen 6 x/week to address the above listed problems via gait training, therapeutic activities, therapeutic exercises, neuromuscular re-education  · Plan of Care Expires:  02/07/18   Plan of Care Reviewed with: patient, spouse    Subjective     Communicated with nurse prior to session.  Patient found supine in bed, wife present, upon PT entry to room, agreeable to treatment.      Chief Complaint: pt did not have any c/o's  Patient comments/goals: pt with eyes open today, though not speaking much at all.    Pain/Comfort:  · Pain Rating 1: 0/10 (no signs of pain)  · Pain Rating Post-Intervention 1: 0/10 (no signs of pain)    Patients cultural, spiritual, Religion conflicts given the current situation: n/a    Objective:     Patient found with: bed alarm, peripheral IV, telemetry, SCD ([employer] monitoring  system)     General Precautions: Standard, anti-coagulation medicine, fall, hearing impaired (elevate HOB)   Orthopedic Precautions:N/A   Braces: N/A     Functional Mobility:  · Bed Mobility:     · Supine to Sit: moderate assistance  · Sit to Supine: moderate assistance  · Transfers:     · Sit to Stand:  maximal assistance and of 2 persons with chair placed in front of pt and using back for support  · Balance: pt req'd modA for static sitting balance, leans to Rt side and posteriorly      AM-PAC 6 CLICK MOBILITY  Turning over in bed (including adjusting bedclothes, sheets and blankets)?: 3  Sitting down on and standing up from a chair with arms (e.g., wheelchair, bedside commode, etc.): 2  Moving from lying on back to sitting on the side of the bed?: 2  Moving to and from a bed to a chair (including a wheelchair)?: 2  Need to walk in hospital room?: 1  Climbing 3-5 steps with a railing?: 1  Total Score: 11       Therapeutic Activities and Exercises:   seated AAROM LAQ's x 10 ea. (OT present and working with pt as well, see note)    Patient left right sidelying with all lines intact, call button in reach, bed alarm on, nurse notified and wife and [employer] monitoring system present present..    GOALS:    Physical Therapy Goals        Problem: Physical Therapy Goal    Goal Priority Disciplines Outcome Goal Variances Interventions   Physical Therapy Goal     PT/OT, PT Ongoing (interventions implemented as appropriate)     Description:  Goals to be met by: 18     Patient will increase functional independence with mobility by performin. Supine to sit with Min A.   2. Sit to supine with min A.   3. Sit<>stand transfer with Min A using rolling walker.   4. Gait > 50 feet with Min A using rolling walker.   5. SPT from EOB < chair with or without AD and min A                    Time Tracking:     PT Received On: 01/10/18  PT Start Time: 1327     PT Stop Time: 1350 (co-tx with OT)  PT Total Time (min): 23 min      Billable Minutes: Therapeutic Activity 11    Treatment Type: Treatment  PT/PTA: PTA     PTA Visit Number: 2     Leila Mackay, PTA  01/10/2018

## 2018-01-10 NOTE — SUBJECTIVE & OBJECTIVE
Interval History:   Sleeping.  Discussed with wife.  He did some participation with activities today.      Review of Systems   Unable to perform ROS: Dementia     Objective:     Vital Signs (Most Recent):  Temp: 99 °F (37.2 °C) (01/10/18 1106)  Pulse: 67 (01/10/18 1400)  Resp: 16 (01/10/18 1106)  BP: (!) 120/56 (01/10/18 1106)  SpO2: 95 % (01/10/18 1106) Vital Signs (24h Range):  Temp:  [98 °F (36.7 °C)-99.4 °F (37.4 °C)] 99 °F (37.2 °C)  Pulse:  [65-82] 67  Resp:  [16] 16  SpO2:  [92 %-98 %] 95 %  BP: (120-154)/(56-73) 120/56     Weight: 81.6 kg (180 lb)  Body mass index is 25.1 kg/m².    Intake/Output Summary (Last 24 hours) at 01/10/18 1440  Last data filed at 01/10/18 1300   Gross per 24 hour   Intake          1895.83 ml   Output             2225 ml   Net          -329.17 ml      Physical Exam   Constitutional: He appears well-developed and well-nourished.   HENT:   Head: Normocephalic and atraumatic.   Eyes: Conjunctivae are normal. Pupils are equal, round, and reactive to light.   Neck: Normal range of motion. Neck supple.   Cardiovascular: Normal rate and regular rhythm.    Pulmonary/Chest: Effort normal and breath sounds normal.   Abdominal: Soft.   Genitourinary:   Genitourinary Comments: Penile edema resolved..      Neurological: He is alert.   Drowsy, arouses.     Skin: Skin is warm and dry.       Significant Labs:   BMP:   Recent Labs  Lab 01/10/18  0416   *      K 3.8   *   CO2 22*   BUN 7*   CREATININE 0.8   CALCIUM 9.7   MG 1.9     CBC:   Recent Labs  Lab 01/09/18  0457 01/10/18  0416   WBC 7.53 7.56   HGB 11.9* 11.5*   HCT 36.9* 35.3*    182     Coagulation:   Recent Labs  Lab 01/10/18  0416   INR 2.1*       Significant Imaging: I have reviewed all pertinent imaging results/findings within the past 24 hours.   Imaging Results    None

## 2018-01-10 NOTE — PROGRESS NOTES
"[facility] Hospital Medicine  Progress Note    Patient Name: Rikki Morrison  MRN: [id number]  Patient Class: OP- Observation   Admission Date: 1/7/2018  Length of Stay: 0 days  Attending Physician: Cyril Huerta MD  Primary Care Provider: ODALYS Villafuerte MD        Subjective:     Principal Problem:Penile swelling    HPI:  Patient is a 77 y.o. male who presents to the ED via EMS with complaint of penile swelling. Wife reports onset of fever yesterday. Per wife, the patient was given subcutaneous fluids through his abdomen last night by the nursing staff at [facility] for dehydration, which they believed to be the cause of the fever. She reports that he was given subcutaneous fluids today as well. Nursing staff noticed penile swelling when changing the patient's diaper this afternoon. Wife states "they said it was the fluid that went the wrong way." She notes that his diaper was wet when it was changed at 4 PM today, but not at 7 PM. Patient is unable to communicate secondary to baseline dementia, and wife states "he can't tell you anything... he never complains and he didn't make any faces as if he were in pain today."  Wife denies any fever today.       Hospital Course:  No notes on file    Interval History:   Sleeping.  Discussed with wife.  He did some participation with activities today.      Review of Systems   Unable to perform ROS: Dementia     Objective:     Vital Signs (Most Recent):  Temp: 99 °F (37.2 °C) (01/10/18 1106)  Pulse: 67 (01/10/18 1400)  Resp: 16 (01/10/18 1106)  BP: (!) 120/56 (01/10/18 1106)  SpO2: 95 % (01/10/18 1106) Vital Signs (24h Range):  Temp:  [98 °F (36.7 °C)-99.4 °F (37.4 °C)] 99 °F (37.2 °C)  Pulse:  [65-82] 67  Resp:  [16] 16  SpO2:  [92 %-98 %] 95 %  BP: (120-154)/(56-73) 120/56     Weight: 81.6 kg (180 lb)  Body mass index is 25.1 kg/m².    Intake/Output Summary (Last 24 hours) at 01/10/18 1440  Last data filed at 01/10/18 1300   Gross per 24 hour "   Intake          1895.83 ml   Output             2225 ml   Net          -329.17 ml      Physical Exam   Constitutional: He appears well-developed and well-nourished.   HENT:   Head: Normocephalic and atraumatic.   Eyes: Conjunctivae are normal. Pupils are equal, round, and reactive to light.   Neck: Normal range of motion. Neck supple.   Cardiovascular: Normal rate and regular rhythm.    Pulmonary/Chest: Effort normal and breath sounds normal.   Abdominal: Soft.   Genitourinary:   Genitourinary Comments: Penile edema resolved..      Neurological: He is alert.   Drowsy, arouses.     Skin: Skin is warm and dry.       Significant Labs:   BMP:   Recent Labs  Lab 01/10/18  0416   *      K 3.8   *   CO2 22*   BUN 7*   CREATININE 0.8   CALCIUM 9.7   MG 1.9     CBC:   Recent Labs  Lab 01/09/18  0457 01/10/18  0416   WBC 7.53 7.56   HGB 11.9* 11.5*   HCT 36.9* 35.3*    182     Coagulation:   Recent Labs  Lab 01/10/18  0416   INR 2.1*       Significant Imaging: I have reviewed all pertinent imaging results/findings within the past 24 hours.   Imaging Results    None       Assessment/Plan:      * Penile swelling    Patient received 1.8L subq infusion of fluid for dehydration. Penile swelling was noticed the next day.  - Urology saw patient  - Appears resolved on exam.             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  - 2004: Mitral valve replacement. [facility] Mechanical Valve.  - 2004: Began warfarin. Anticoagulation managed by Dr. Gamino.               Indication: Mechanical mitral valve and atrial fibrillation              Target INR 2.5-3.0          Urinary tract infection without hematuria    - UA appears consistent with infection  - Urine culture no significant growth.   - Empiric Rocephin day 3  - UA shows some improvement.  - Given takes coumadin and potential for drug interaction, will consider another 3 days Cipro  empirically as outpatient.             Dementia due to Parkinson's disease without behavioral disturbance    Stable    Continue galantamine, namenda          Chronic anticoagulation    - Continue Coumadin  - INR 1.9 > 2.1.    - Per cardiology note         Hypercholesterolemia    - Continue Crestor    Results for TO CHAMBERS (MRN [id number]) as of 1/9/2018 16:47   Ref. Range 1/8/2018 04:32   Cholesterol Latest Ref Range: 120 - 199 mg/dL 97 (L)   HDL Latest Ref Range: 40 - 75 mg/dL 19 (L)   LDL Cholesterol Latest Ref Range: 63.0 - 159.0 mg/dL 54.0 (L)   Total Cholesterol/HDL Ratio Latest Ref Range: 2.0 - 5.0  5.1 (H)   Triglycerides Latest Ref Range: 30 - 150 mg/dL 120           Type 2 diabetes mellitus without complication, without long-term current use of insulin    Diet controlled  A1c = 5.8          Permanent atrial fibrillation    Chronic afib  Continue ASA, warfarin  Per cardiology note 12/8/17:  2004: Began warfarin. Anticoagulation managed by Dr. Gamino.               Indication: Mechanical mitral valve and atrial fibrillation              Target INR 2.5-3.0              On warfarin and aspirin 81 mg Q24.            VTE Risk Mitigation         Ordered     Place sequential compression device  Until discontinued      01/09/18 1427     warfarin (COUMADIN) tablet 5 mg  Daily     Route:  Oral        01/08/18 0106     Medium Risk of VTE  Once      01/08/18 0106     Reason for No Pharmacological VTE Prophylaxis  Once      01/08/18 0106     Place MYRTLE hose  Until discontinued      01/08/18 0106              Cyril Huerta MD  Department of Hospital Medicine   [facility]

## 2018-01-10 NOTE — DISCHARGE INSTRUCTIONS
Thank you for choosing [facility] as your Health Care Provider. [facility] strives to provide the best healthcare available to you. In the next few days you may receive a Survey, either by mail or email,  asking you to rate our care that was provided to you during your stay.  Please return the survey to us, as your feedback is important. We aim to meet your expectations of safe, quality health care.    From your [facility] Team.

## 2018-01-10 NOTE — PLAN OF CARE
Problem: Physical Therapy Goal  Goal: Physical Therapy Goal  Goals to be met by: 18     Patient will increase functional independence with mobility by performin. Supine to sit with Min A.   2. Sit to supine with min A.   3. Sit<>stand transfer with Min A using rolling walker.   4. Gait > 50 feet with Min A using rolling walker.   5. SPT from EOB < chair with or without AD and min A   Pt with slow progression towards goals today, sup to sit modA x 1, sit to stand max.A x 2 for partial stand. Recommend SNF (with transition to NH longterm).

## 2018-01-10 NOTE — PLAN OF CARE
[facility]   Department of Hospital Medicine  [address] [place] [id number]  [phone] (phone)  [phone] (fax)      Facility Transfer Orders                        01/12/2018    Rikki Morrison    Admit to: Nursing Home    Diagnoses:  Active Hospital Problems    Diagnosis  POA    *Penile swelling [N48.89]  Yes    H/O mitral valve replacement with mechanical valve [Z95.2]  Not Applicable    Urinary tract infection without hematuria [N39.0]  Yes    Dementia due to Parkinson's disease without behavioral disturbance [G20, F02.80]  Yes     Dr. Mae      Chronic anticoagulation [Z79.01]  Not Applicable     2004: Began warfarin. Anticoagulation managed by Dr. Gamino.   Indication: Mechanical mitral valve and atrial fibrillation.      Hypercholesterolemia [E78.00]  Yes     2004: Began statin.      Permanent atrial fibrillation [I48.2]  Yes     2004: Diagnosed with permanent atrial fibrillation.  On warfarin. Has mechanical mitral valve.        Type 2 diabetes mellitus without complication, without long-term current use of insulin [E11.9]  Yes     2008: Diagnosed. Complications: None. Medications: Diet.        Resolved Hospital Problems    Diagnosis Date Resolved POA   No resolved problems to display.       Allergies:  Review of patient's allergies indicates:   Allergen Reactions    Pcn [penicillins] Rash       Vitals:         Per facility protocol.    Diet: Regular     Activity:      - Up in a chair each morning as tolerated   - Ambulate with assistance to bathroom         Nursing Precautions:       - Aspiration precautions:             - Total assistance with meals            -  Upright 90 degrees befor during and after meals                   - Fall precautions     - Decubitus precautions:        -  for positioning   - Pressure reducing foam mattress   - Turn patient every two hours. Use wedge pillows to anchor patient      CONSULTS:      PT to  evaluate and treat - five times a week     OT to evaluate and treat - five times a week     ST to evaluate and treat     Nutrition to evaluate and recommend diet      LABS:  Patient has mechanical mitral valve and is on coumadin.  PT INR every Tuesday and Thursday until therapeutic as patient on Coumadin and oral antibiotic (Ciprofloxacin).  Then per facility protocol.  GOAL INR: 2.5 to 3 per cardiology:  Mitral Valve Disease              2004: Mitral valve replacement. [employer] Mechanical Valve.              On warfarin and aspirin.      LIST OF DISCHARGE MEDICATIONS:     Rikki Morrison   Home Medication Instructions SALLY:[id number]    Printed on:01/10/18 5020   Medication Information                      amlodipine (NORVASC) 2.5 MG tablet  TAKE ONE TABLET BY MOUTH ONCE DAILY             aspirin (ECOTRIN) 81 MG EC tablet  Take 1 tablet (81 mg total) by mouth once daily.             cholecalciferol, vitamin D3, (VITAMIN D3) 1,000 unit capsule  Take 1,000 Units by mouth once daily.             ciprofloxacin HCl (CIPRO) 500 MG tablet  Take 1 tablet (500 mg total) by mouth every 12 (twelve) hours.  --Through 1/14/18           cyanocobalamin, vitamin B-12, 1,000 mcg Subl  Place 1 tablet under the tongue once daily.             ferrous sulfate 325 (65 FE) MG EC tablet  Take 325 mg by mouth once daily. at dinner             finasteride (PROSCAR) 5 mg tablet  TAKE ONE TABLET BY MOUTH ONCE DAILY             fish oil-omega-3 fatty acids 300-1,000 mg capsule  Take 1 g by mouth once daily.             galantamine (RAZADYNE) 8 MG Tab  Take 8 mg by mouth every evening.              memantine (NAMENDA) 10 MG Tab  Take 10 mg by mouth 2 (two) times daily.              metoprolol succinate (TOPROL-XL) 50 MG 24 hr tablet  TAKE ONE TABLET BY MOUTH ONCE DAILY             nitroGLYCERIN (NITROSTAT) 0.4 MG SL tablet  Place 1 tablet (0.4 mg total) under the tongue every 5 (five) minutes as needed for Chest pain.             omeprazole  (PRILOSEC) 20 MG capsule  TAKE ONE CAPSULE BY MOUTH TWICE DAILY             potassium citrate (UROCIT-K) 10 mEq (1,080 mg) TbSR  Take 1 tablet (10 mEq total) by mouth 3 (three) times daily with meals.             QUEtiapine (SEROQUEL) 25 MG Tab  Take 25 mg by mouth once daily. 1 hour prior to bed time             rosuvastatin (CRESTOR) 5 MG tablet  Take 1 tablet (5 mg total) by mouth every evening.             trospium (SANCTURA XR) 60 mg Cp24 capsule  Take 60 mg by mouth every morning.              UBIDECARENONE (COENZYME Q10) 200 mg Tab  Take 1 capsule by mouth once daily.              vortioxetine (TRINTELLIX) 10 mg Tab  Take 10 mg by mouth once daily.             warfarin (COUMADIN) 5 MG tablet  TAKE ONE TABLET BY MOUTH ONCE DAILY AT BEDTIME                           _________________________________    Cyril Huerta MD  01/12/2018

## 2018-01-10 NOTE — PLAN OF CARE
1206-Writer spoke with Daily at [facility] [phone] regarding admitting to SNF. Daily confirmed clinicals and orders have been received, will be reviewed by DON. Writer also spoke with Aundrea from [facility] ([facility]) [phone] in regards to authorization. Per Aundrea, all therapy notes and orders received. Pending authorization from [facility].

## 2018-01-10 NOTE — PLAN OF CARE
Problem: Occupational Therapy Goal  Goal: Occupational Therapy Goal  Goals to be met by: 1/16/2018    Patient will increase functional independence with ADLs by performing:    Feeding with Maximum Assistance.  Grooming while seated with Maximum Assistance.  Stand pivot transfers with Moderate Assistance.  Upper extremity exercise program x10 reps per handout, with assistance as needed.     Outcome: Ongoing (interventions implemented as appropriate)  Increased time to alert patient (sternal rub) from sleep.  (R) lateral lean in static sitting, requiring Mod assist to maintain balance.  Two person Max assist to achieve partial stand.  Hand held assist to initiate washing face seated at EOB, but able to follow through and terminate without assist.  Patient could use long term SNF as bridge to NH placement.  Continue with OT to maximize patient ADL task performance.    Comments: AZEEM Pope, 1/10/2018

## 2018-01-10 NOTE — PLAN OF CARE
"[id number] spoke with Rayne Cooney from [facility] ([facility]) in regards to patient returning to [facility]. Per Rayne, patient would need a new auth as he was scheduled to D/C from SNF "four days before he was admitted to the hospital". Writer informed Rayne wife was paying to hold patient's bed and informed her of discussion with admissions coordinator Eileen. Rayne requests clinicals be sent to [facility] for SNF auth review; sent via [employer].   "

## 2018-01-10 NOTE — ASSESSMENT & PLAN NOTE
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  - 2004: Mitral valve replacement. [employer]il Mechanical Valve.  - 2004: Began warfarin. Anticoagulation managed by Dr. Gamino.               Indication: Mechanical mitral valve and atrial fibrillation              Target INR 2.5-3.0

## 2018-01-10 NOTE — PT/OT/SLP PROGRESS
Occupational Therapy   Treatment  (with PTA)  Name: Rikki Morrison  MRN: [id number]  Admitting Diagnosis:  Penile swelling       Recommendations:     Discharge Recommendations: nursing facility, skilled  Discharge Equipment Recommendations:   (TBD)  Barriers to discharge:  None (to SNF)    Subjective     Communicated with: RN prior to session.  Pain/Comfort:  · Pain Rating 1: 0/10 (no grimacing during session)  · Pain Rating Post-Intervention 1: 0/10 (no grimacing during session)    Objective:     Patient found with: bed alarm, peripheral IV, telemetry, SCD    General Precautions: Standard, anti-coagulation medicine, fall, other (see comments), hearing impaired (elevate HOB)   Orthopedic Precautions:N/A   Braces: N/A     Occupational Performance:    Bed Mobility:    · Patient completed Supine to Sit with moderate assistance  · Patient completed Sit to Supine with moderate assistance     Functional Mobility/Transfers:  Patient completed Sit <> Stand Transfer with maximal assistance, of 2 persons and unable to achieve full upright stance x 3 to 4 trials      Activities of Daily Living:  · Grooming: maximal assistance /hand held assist to initiate washing face seated at EOB    Patient left right sidelying with all lines intact, call button in reach, bed alarm on, RN notified and spouse present    AMPA 6 Click:  [facility] Total Score: 7    Treatment & Education:  Patient sat at EOB x approx 20 min with Mod assist secondary to (R) lateral lean.  Engaged in shoulder flex AAROM, 1 set x 10 reps.  Education:    Assessment:     Rikki Morrison is a 77 y.o. male with a medical diagnosis of Penile swelling.  He presents with performance deficits affecting function are weakness, impaired endurance, impaired self care skills, impaired functional mobilty, gait instability, impaired balance, impaired cognition, decreased upper extremity function, decreased lower extremity function, decreased safety awareness, abnormal tone,  impaired coordination.  Increased time to alert patient (sternal rub) from sleep.  (R) lateral lean in static sitting, requiring Mod assist to maintain balance.  Two person Max assist to achieve partial stand.  Hand held assist to initiate washing face seated at EOB, but able to follow through and terminate without assist.  Continue with OT to maximize patient ADL task performance.    Rehab Prognosis:  Fair; patient would benefit from acute skilled OT services to address these deficits and reach maximum level of function.       Plan:     Patient to be seen 5 x/week to address the above listed problems via self-care/home management, therapeutic activities, therapeutic exercises  · Plan of Care Expires: 02/09/18  · Plan of Care Reviewed with: patient, spouse    This Plan of care has been discussed with the patient who was involved in its development and understands and is in agreement with the identified goals and treatment plan    GOALS:    Occupational Therapy Goals        Problem: Occupational Therapy Goal    Goal Priority Disciplines Outcome Interventions   Occupational Therapy Goal     OT, PT/OT Ongoing (interventions implemented as appropriate)    Description:  Goals to be met by: 1/16/2018    Patient will increase functional independence with ADLs by performing:    Feeding with Maximum Assistance.  Grooming while seated with Maximum Assistance.  Stand pivot transfers with Moderate Assistance.  Upper extremity exercise program x10 reps per handout, with assistance as needed.                      Time Tracking:     OT Date of Treatment: 01/10/18  OT Start Time: 1325  OT Stop Time: 1348  OT Total Time (min): 23 min    Billable Minutes:Self Care/Home Management 12 (co tx with PTA)    AZEEM Pope  1/10/2018

## 2018-01-11 LAB
ALBUMIN SERPL BCP-MCNC: 2.9 G/DL
ALP SERPL-CCNC: 104 U/L
ALT SERPL W/O P-5'-P-CCNC: 12 U/L
ANION GAP SERPL CALC-SCNC: 7 MMOL/L
AST SERPL-CCNC: 13 U/L
BASOPHILS # BLD AUTO: 0.01 K/UL
BASOPHILS NFR BLD: 0.1 %
BILIRUB SERPL-MCNC: 0.4 MG/DL
BUN SERPL-MCNC: 10 MG/DL
CALCIUM SERPL-MCNC: 9.8 MG/DL
CHLORIDE SERPL-SCNC: 109 MMOL/L
CO2 SERPL-SCNC: 21 MMOL/L
CREAT SERPL-MCNC: 0.8 MG/DL
DIFFERENTIAL METHOD: ABNORMAL
EOSINOPHIL # BLD AUTO: 0.7 K/UL
EOSINOPHIL NFR BLD: 7.8 %
ERYTHROCYTE [DISTWIDTH] IN BLOOD BY AUTOMATED COUNT: 14.1 %
EST. GFR  (AFRICAN AMERICAN): >60 ML/MIN/1.73 M^2
EST. GFR  (NON AFRICAN AMERICAN): >60 ML/MIN/1.73 M^2
GLUCOSE SERPL-MCNC: 114 MG/DL
HCT VFR BLD AUTO: 36.9 %
HGB BLD-MCNC: 12.1 G/DL
INR PPP: 2.1
LYMPHOCYTES # BLD AUTO: 2.4 K/UL
LYMPHOCYTES NFR BLD: 25.3 %
MAGNESIUM SERPL-MCNC: 1.8 MG/DL
MCH RBC QN AUTO: 29.7 PG
MCHC RBC AUTO-ENTMCNC: 32.8 G/DL
MCV RBC AUTO: 91 FL
MONOCYTES # BLD AUTO: 1 K/UL
MONOCYTES NFR BLD: 10.1 %
NEUTROPHILS # BLD AUTO: 5.3 K/UL
NEUTROPHILS NFR BLD: 56.5 %
PHOSPHATE SERPL-MCNC: 2.6 MG/DL
PLATELET # BLD AUTO: 221 K/UL
PMV BLD AUTO: 9.7 FL
POTASSIUM SERPL-SCNC: 4 MMOL/L
PROT SERPL-MCNC: 5.7 G/DL
PROTHROMBIN TIME: 22.8 SEC
RBC # BLD AUTO: 4.07 M/UL
SODIUM SERPL-SCNC: 137 MMOL/L
WBC # BLD AUTO: 9.46 K/UL

## 2018-01-11 PROCEDURE — 36415 COLL VENOUS BLD VENIPUNCTURE: CPT

## 2018-01-11 PROCEDURE — 80053 COMPREHEN METABOLIC PANEL: CPT

## 2018-01-11 PROCEDURE — 25000003 PHARM REV CODE 250: Performed by: HOSPITALIST

## 2018-01-11 PROCEDURE — 94761 N-INVAS EAR/PLS OXIMETRY MLT: CPT

## 2018-01-11 PROCEDURE — G0378 HOSPITAL OBSERVATION PER HR: HCPCS

## 2018-01-11 PROCEDURE — 25000003 PHARM REV CODE 250: Performed by: NURSE PRACTITIONER

## 2018-01-11 PROCEDURE — 85610 PROTHROMBIN TIME: CPT

## 2018-01-11 PROCEDURE — 84100 ASSAY OF PHOSPHORUS: CPT

## 2018-01-11 PROCEDURE — 97530 THERAPEUTIC ACTIVITIES: CPT

## 2018-01-11 PROCEDURE — 83735 ASSAY OF MAGNESIUM: CPT

## 2018-01-11 PROCEDURE — 99225 PR SUBSEQUENT OBSERVATION CARE,LEVEL II: CPT | Mod: ,,, | Performed by: HOSPITALIST

## 2018-01-11 PROCEDURE — 99900035 HC TECH TIME PER 15 MIN (STAT)

## 2018-01-11 PROCEDURE — 85025 COMPLETE CBC W/AUTO DIFF WBC: CPT

## 2018-01-11 RX ADMIN — PANTOPRAZOLE SODIUM 40 MG: 40 TABLET, DELAYED RELEASE ORAL at 09:01

## 2018-01-11 RX ADMIN — POTASSIUM CITRATE 10 MEQ: 5 TABLET ORAL at 09:01

## 2018-01-11 RX ADMIN — CIPROFLOXACIN 500 MG: 500 TABLET, FILM COATED ORAL at 09:01

## 2018-01-11 RX ADMIN — VITAMIN D, TAB 1000IU (100/BT) 1000 UNITS: 25 TAB at 09:01

## 2018-01-11 RX ADMIN — WARFARIN SODIUM 5 MG: 5 TABLET ORAL at 04:01

## 2018-01-11 RX ADMIN — AMLODIPINE BESYLATE 2.5 MG: 2.5 TABLET ORAL at 09:01

## 2018-01-11 RX ADMIN — ROSUVASTATIN CALCIUM 5 MG: 5 TABLET ORAL at 09:01

## 2018-01-11 RX ADMIN — QUETIAPINE FUMARATE 25 MG: 25 TABLET, FILM COATED ORAL at 09:01

## 2018-01-11 RX ADMIN — METOPROLOL SUCCINATE 50 MG: 50 TABLET, EXTENDED RELEASE ORAL at 06:01

## 2018-01-11 RX ADMIN — ASPIRIN 81 MG: 81 TABLET, COATED ORAL at 09:01

## 2018-01-11 RX ADMIN — POTASSIUM CITRATE 10 MEQ: 5 TABLET ORAL at 04:01

## 2018-01-11 RX ADMIN — FINASTERIDE 5 MG: 5 TABLET, FILM COATED ORAL at 09:01

## 2018-01-11 RX ADMIN — MEMANTINE HYDROCHLORIDE 10 MG: 10 TABLET ORAL at 09:01

## 2018-01-11 RX ADMIN — FERROUS SULFATE TAB EC 325 MG (65 MG FE EQUIVALENT) 325 MG: 325 (65 FE) TABLET DELAYED RESPONSE at 09:01

## 2018-01-11 RX ADMIN — OMEGA-3 FATTY ACIDS CAP DELAYED RELEASE 1000 MG 1 CAPSULE: 1000 CAPSULE DELAYED RELEASE at 09:01

## 2018-01-11 NOTE — PROGRESS NOTES
"[facility] Hospital Medicine  Progress Note    Patient Name: Rikki Morrison  MRN: [id number]  Patient Class: OP- Observation   Admission Date: 1/7/2018  Length of Stay: 0 days  Attending Physician: Cyril Huerta MD  Primary Care Provider: ODALYS Villafuerte MD        Subjective:     Principal Problem:Penile swelling    HPI:  Patient is a 77 y.o. male who presents to the ED via EMS with complaint of penile swelling. Wife reports onset of fever yesterday. Per wife, the patient was given subcutaneous fluids through his abdomen last night by the nursing staff at [facility] for dehydration, which they believed to be the cause of the fever. She reports that he was given subcutaneous fluids today as well. Nursing staff noticed penile swelling when changing the patient's diaper this afternoon. Wife states "they said it was the fluid that went the wrong way." She notes that his diaper was wet when it was changed at 4 PM today, but not at 7 PM. Patient is unable to communicate secondary to baseline dementia, and wife states "he can't tell you anything... he never complains and he didn't make any faces as if he were in pain today."  Wife denies any fever today.       Hospital Course:  No notes on file    No new subjective & objective note has been filed under this hospital service since the last note was generated.    Assessment/Plan:      * Penile swelling    Patient received 1.8L subq infusion of fluid for dehydration. Penile swelling was noticed the next day.  - Urology saw patient  - Appears resolved on exam.             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  - 2004: Mitral valve replacement. [employer]il Mechanical Valve.  - 2004: Began warfarin. Anticoagulation managed by Dr. Gamino.               Indication: Mechanical mitral valve and atrial fibrillation              Target INR 2.5-3.0          Urinary tract infection without " hematuria    - UA appears consistent with infection  - Urine culture no significant growth.   - Empiric Rocephin 3 days  - Cipro to complete course antibiotic day #4  - UA shows some improvement.  - Given takes coumadin and potential for drug interaction, will consider another 3 days Cipro empirically as outpatient.             Dementia due to Parkinson's disease without behavioral disturbance    Stable  Continue galantamine, namenda          Chronic anticoagulation    - Continue Coumadin  - INR 1.9 > 2.1 here in hospital on coumadin 5 mg daily.    - Monitor INR as coumadin dose may need adjusting as noted.            Hypercholesterolemia    - Continue Crestor    Results for TO CHAMBERS (MRN [id number]) as of 1/9/2018 16:47   Ref. Range 1/8/2018 04:32   Cholesterol Latest Ref Range: 120 - 199 mg/dL 97 (L)   HDL Latest Ref Range: 40 - 75 mg/dL 19 (L)   LDL Cholesterol Latest Ref Range: 63.0 - 159.0 mg/dL 54.0 (L)   Total Cholesterol/HDL Ratio Latest Ref Range: 2.0 - 5.0  5.1 (H)   Triglycerides Latest Ref Range: 30 - 150 mg/dL 120           Type 2 diabetes mellitus without complication, without long-term current use of insulin    Diet controlled  A1c = 5.8          Permanent atrial fibrillation    Chronic afib  Continue ASA, warfarin  Per cardiology note 12/8/17:  2004: Began warfarin. Anticoagulation managed by Dr. Gamino.               Indication: Mechanical mitral valve and atrial fibrillation              Target INR 2.5-3.0              On warfarin and aspirin 81 mg Q24.            VTE Risk Mitigation         Ordered     Place sequential compression device  Until discontinued      01/09/18 1427     warfarin (COUMADIN) tablet 5 mg  Daily     Route:  Oral        01/08/18 0106     Medium Risk of VTE  Once      01/08/18 0106     Reason for No Pharmacological VTE Prophylaxis  Once      01/08/18 0106     Place MYRTLE hose  Until discontinued      01/08/18 0106              Cyril Huerta MD  Department of Hospital  Medicine   [facility]

## 2018-01-11 NOTE — PLAN OF CARE
Problem: Physical Therapy Goal  Goal: Physical Therapy Goal  Goals to be met by: 18     Patient will increase functional independence with mobility by performin. Supine to sit with Min A.   2. Sit to supine with min A.   3. Sit<>stand transfer with Min A using rolling walker.   4. Gait > 50 feet with Min A using rolling walker.   5. SPT from EOB < chair with or without AD and min A   Pt with slow progression towards goals today, sup to sit max. A x 2, sit to stand attempts max. A x 2, with and without RW. Sat EOB ~20 min. With mostly mod/maxA, but occasional SBA for static sitting balance. Recommend return to SNF with [facility].

## 2018-01-11 NOTE — PT/OT/SLP PROGRESS
"Occupational Therapy  Co-Treatment with PTA    Name: Rikki Morrison  MRN: [id number]  Admitting Diagnosis:  Penile swelling       Recommendations:     Discharge Recommendations: nursing facility, skilled  Discharge Equipment Recommendations:   (TBD)  Barriers to discharge:   (increased caregiver burden)    Subjective     Communicated with: RN prior to session.  Son present reports, "Ya my mom went to surgery today and my sister is currently with her. It's been a lot lately."    Pain/Comfort:  · Pain Rating 1: 0/10 (no pain signs observed)  · Pain Rating Post-Intervention 1: 0/10    Objective:     Patient found with: bed alarm, SCD, Condom Catheter, telemetry (Avasystem monitor in room)    General Precautions: Standard, anti-coagulation medicine, fall, hearing impaired (elevate HOB)   Orthopedic Precautions:N/A   Braces: N/A     Occupational Performance:    Bed Mobility:    · Patient completed Scooting/Bridging with minimum assistance towards HOB in trendelenburg and cues for technique and maximal assistance for scooting towards EOB  · Patient completed Supine to Sit with maximal assistance x2 persons  · Patient completed Sit to Supine with maximal assistance x2 persons    Functional Mobility/Transfers:  · Patient completed Sit <> Stand Transfer with maximal assistance and of 2 persons  with  no assistive device and rolling walker  x3 trials from EOB- pt resistive, BLE sliding despite blocking of sol knees, posterior lean     Activities of Daily Living:  · Feeding:  total assistance attempted self-feeding with Tanana, however pt resistive and with abnormal B UE tone therefore difficulty achieving task even with Tanana    Patient left HOB elevated with all lines intact, call button in reach and RN notified    [facility] 6 Click:  [facility] Total Score: 7    Treatment & Education:  While addressing self-feeding at EOB, PTA working on sitting balance- pt's balance ranged from brief SBA to max (A) with tendency for (R) posterior lean. "     Also completed B UE PROM x5 reps in all available planes to prevent contractures and muscle tightening.   Education:    Assessment:     Rikki Morrison is a 77 y.o. male with a medical diagnosis of Penile swelling. Pt is making steady progress towards his OT goals. Pt is continuing to require total (A) for self-feeding and max (A) x2 for sit>stand transfers from EOB. Pt was able to scoot himself to HOB with only min (A) and use of bed features. Pt able to follow ~25% of therapist's commands throughout session. Feel that pt's need are currently too significant for his family to manage at home. Pt with recent D/C from SNF, therefore if pt doesn't get reaccepted, family will need to consider either increased (A) at home or NH placement. Performance deficits affecting function are weakness, impaired endurance, impaired self care skills, impaired functional mobilty, gait instability, impaired balance, impaired cognition, decreased upper extremity function, decreased lower extremity function, decreased safety awareness, abnormal tone, decreased ROM.      Rehab Prognosis:  Fair ; patient would benefit from acute skilled OT services to address these deficits and reach maximum level of function.       Plan:     Patient to be seen 5 x/week to address the above listed problems via self-care/home management, therapeutic activities, therapeutic exercises  · Plan of Care Expires: 02/09/18  · Plan of Care Reviewed with: patient    This Plan of care has been discussed with the patient who was involved in its development and understands and is in agreement with the identified goals and treatment plan    GOALS:    Occupational Therapy Goals        Problem: Occupational Therapy Goal    Goal Priority Disciplines Outcome Interventions   Occupational Therapy Goal     OT, PT/OT Ongoing (interventions implemented as appropriate)    Description:  Goals to be met by: 1/16/2018    Patient will increase functional independence with ADLs  by performing:    Feeding with Maximum Assistance.  Grooming while seated with Maximum Assistance.  Stand pivot transfers with Moderate Assistance.  Upper extremity exercise program x10 reps per handout, with assistance as needed.                      Time Tracking:     OT Date of Treatment: 01/11/18  OT Start Time: 1315  OT Stop Time: 1340  OT Total Time (min): 25 min    Billable Minutes:Therapeutic Activity 13 (split time with PTA)    Karma Bird OTR/ASHKAN  1/11/2018

## 2018-01-11 NOTE — PROGRESS NOTES
"[facility] Hospital Medicine  Progress Note    Patient Name: Rikki Morrison  MRN: [id number]  Patient Class: OP- Observation   Admission Date: 1/7/2018  Length of Stay: 0 days  Attending Physician: Cyril Huerta MD  Primary Care Provider: ODALYS Villafuerte MD        Subjective:     Principal Problem:Penile swelling    HPI:  Patient is a 77 y.o. male who presents to the ED via EMS with complaint of penile swelling. Wife reports onset of fever yesterday. Per wife, the patient was given subcutaneous fluids through his abdomen last night by the nursing staff at [facility] for dehydration, which they believed to be the cause of the fever. She reports that he was given subcutaneous fluids today as well. Nursing staff noticed penile swelling when changing the patient's diaper this afternoon. Wife states "they said it was the fluid that went the wrong way." She notes that his diaper was wet when it was changed at 4 PM today, but not at 7 PM. Patient is unable to communicate secondary to baseline dementia, and wife states "he can't tell you anything... he never complains and he didn't make any faces as if he were in pain today."  Wife denies any fever today.       Hospital Course:  No notes on file    Interval History:   Alert, but only makes occasional sound.        Review of Systems   Unable to perform ROS: Dementia     Objective:     Vital Signs (Most Recent):  Temp: 98.7 °F (37.1 °C) (01/11/18 1148)  Pulse: 81 (01/11/18 1148)  Resp: 18 (01/11/18 1148)  BP: (!) 107/57 (01/11/18 1148)  SpO2: (!) 93 % (01/11/18 1148) Vital Signs (24h Range):  Temp:  [98.1 °F (36.7 °C)-99.1 °F (37.3 °C)] 98.7 °F (37.1 °C)  Pulse:  [73-81] 81  Resp:  [16-18] 18  SpO2:  [93 %-95 %] 93 %  BP: (107-171)/(57-80) 107/57     Weight: 73.7 kg (162 lb 7.7 oz)  Body mass index is 22.66 kg/m².    Intake/Output Summary (Last 24 hours) at 01/11/18 1532  Last data filed at 01/11/18 0600   Gross per 24 hour   Intake              " 780 ml   Output                0 ml   Net              780 ml      Physical Exam   Constitutional: He appears well-developed and well-nourished.   HENT:   Head: Normocephalic and atraumatic.   Eyes: Conjunctivae are normal. Pupils are equal, round, and reactive to light.   Neck: Normal range of motion. Neck supple.   Cardiovascular: Normal rate and regular rhythm.    Pulmonary/Chest: Effort normal and breath sounds normal.   Abdominal: Soft.   Genitourinary:   Genitourinary Comments: Penile edema resolved..      Neurological: He is alert.   Skin: Skin is warm and dry.       Significant Labs:   BMP:   Recent Labs  Lab 01/11/18  0328   *      K 4.0      CO2 21*   BUN 10   CREATININE 0.8   CALCIUM 9.8   MG 1.8     CBC:   Recent Labs  Lab 01/10/18  0416 01/11/18  0328   WBC 7.56 9.46   HGB 11.5* 12.1*   HCT 35.3* 36.9*    221       Significant Imaging: I have reviewed all pertinent imaging results/findings within the past 24 hours.   Imaging Results    None       Assessment/Plan:      * Penile swelling    Patient received 1.8L subq infusion of fluid for dehydration. Penile swelling was noticed the next day.  - Urology saw patient  - Appears resolved on exam.             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  - 2004: Mitral valve replacement. [employer]il Mechanical Valve.  - 2004: Began warfarin. Anticoagulation managed by Dr. Gamino.               Indication: Mechanical mitral valve and atrial fibrillation              Target INR 2.5-3.0          Urinary tract infection without hematuria    - UA appears consistent with infection  - Urine culture no significant growth.   - Empiric Rocephin 3 days  - Cipro to complete course antibiotic day #4  - UA shows some improvement.  - Given takes coumadin and potential for drug interaction, will consider another 3 days Cipro empirically as outpatient.             Dementia due to  Parkinson's disease without behavioral disturbance    Stable  Continue galantamine, namenda          Chronic anticoagulation    - Continue Coumadin  - INR 1.9 > 2.1 here in hospital on coumadin 5 mg daily.    - Monitor INR as coumadin dose may need adjusting as noted.            Hypercholesterolemia    - Continue Crestor    Results for TO CHAMBERS (MRN [id number]) as of 1/9/2018 16:47   Ref. Range 1/8/2018 04:32   Cholesterol Latest Ref Range: 120 - 199 mg/dL 97 (L)   HDL Latest Ref Range: 40 - 75 mg/dL 19 (L)   LDL Cholesterol Latest Ref Range: 63.0 - 159.0 mg/dL 54.0 (L)   Total Cholesterol/HDL Ratio Latest Ref Range: 2.0 - 5.0  5.1 (H)   Triglycerides Latest Ref Range: 30 - 150 mg/dL 120           Type 2 diabetes mellitus without complication, without long-term current use of insulin    Diet controlled  A1c = 5.8          Permanent atrial fibrillation    Chronic afib  Continue ASA, warfarin  Per cardiology note 12/8/17:  2004: Began warfarin. Anticoagulation managed by Dr. Gamino.               Indication: Mechanical mitral valve and atrial fibrillation              Target INR 2.5-3.0              On warfarin and aspirin 81 mg Q24.            VTE Risk Mitigation         Ordered     Place sequential compression device  Until discontinued      01/09/18 1427     warfarin (COUMADIN) tablet 5 mg  Daily     Route:  Oral        01/08/18 0106     Medium Risk of VTE  Once      01/08/18 0106     Reason for No Pharmacological VTE Prophylaxis  Once      01/08/18 0106     Place MYRTLE hose  Until discontinued      01/08/18 0106              Cyril Huerta MD  Department of Hospital Medicine   [facility]

## 2018-01-11 NOTE — PLAN OF CARE
Problem: Occupational Therapy Goal  Goal: Occupational Therapy Goal  Goals to be met by: 1/16/2018    Patient will increase functional independence with ADLs by performing:    Feeding with Maximum Assistance.  Grooming while seated with Maximum Assistance.  Stand pivot transfers with Moderate Assistance.  Upper extremity exercise program x10 reps per handout, with assistance as needed.     Outcome: Ongoing (interventions implemented as appropriate)  Pt is making steady progress towards his OT goals. Goals remain appropriate at this time.     Karma Bird, OTR/L  1/11/2018

## 2018-01-11 NOTE — ASSESSMENT & PLAN NOTE
- UA appears consistent with infection  - Urine culture no significant growth.   - Empiric Rocephin 3 days  - Cipro to complete course antibiotic day #4  - UA shows some improvement.  - Given takes coumadin and potential for drug interaction, will consider another 3 days Cipro empirically as outpatient.

## 2018-01-11 NOTE — PLAN OF CARE
JENAE spoke with Rayne Tom at [facility], [phone], to inquire about auth for SNF for pt. Informed by Rayne that pt is still in MRU. Rayne will notify JENAE when she receives an answer. CM to follow.

## 2018-01-11 NOTE — PLAN OF CARE
Problem: Patient Care Overview  Goal: Plan of Care Review  Outcome: Ongoing (interventions implemented as appropriate)  Pt free from falls, injury, and skin breakdown this shift. VS stable on RA, re-positions per self and with one assist. Incontinent care provided. Awaiting approval for pt to move to SNF facility. IV fluids maintained. All questions answered. Bed locked and low, call light within reach, camera in use, bed alarm set, purposeful hourly rounding completed. Will continue to monitor

## 2018-01-11 NOTE — SUBJECTIVE & OBJECTIVE
Interval History:   Alert, but only makes occasional sound.        Review of Systems   Unable to perform ROS: Dementia     Objective:     Vital Signs (Most Recent):  Temp: 98.7 °F (37.1 °C) (01/11/18 1148)  Pulse: 81 (01/11/18 1148)  Resp: 18 (01/11/18 1148)  BP: (!) 107/57 (01/11/18 1148)  SpO2: (!) 93 % (01/11/18 1148) Vital Signs (24h Range):  Temp:  [98.1 °F (36.7 °C)-99.1 °F (37.3 °C)] 98.7 °F (37.1 °C)  Pulse:  [73-81] 81  Resp:  [16-18] 18  SpO2:  [93 %-95 %] 93 %  BP: (107-171)/(57-80) 107/57     Weight: 73.7 kg (162 lb 7.7 oz)  Body mass index is 22.66 kg/m².    Intake/Output Summary (Last 24 hours) at 01/11/18 1532  Last data filed at 01/11/18 0600   Gross per 24 hour   Intake              780 ml   Output                0 ml   Net              780 ml      Physical Exam   Constitutional: He appears well-developed and well-nourished.   HENT:   Head: Normocephalic and atraumatic.   Eyes: Conjunctivae are normal. Pupils are equal, round, and reactive to light.   Neck: Normal range of motion. Neck supple.   Cardiovascular: Normal rate and regular rhythm.    Pulmonary/Chest: Effort normal and breath sounds normal.   Abdominal: Soft.   Genitourinary:   Genitourinary Comments: Penile edema resolved..      Neurological: He is alert.   Skin: Skin is warm and dry.       Significant Labs:   BMP:   Recent Labs  Lab 01/11/18  0328   *      K 4.0      CO2 21*   BUN 10   CREATININE 0.8   CALCIUM 9.8   MG 1.8     CBC:   Recent Labs  Lab 01/10/18  0416 01/11/18  0328   WBC 7.56 9.46   HGB 11.5* 12.1*   HCT 35.3* 36.9*    221       Significant Imaging: I have reviewed all pertinent imaging results/findings within the past 24 hours.   Imaging Results    None

## 2018-01-11 NOTE — PT/OT/SLP PROGRESS
Physical Therapy Treatment    Patient Name:  Rikki Morrison   MRN:  [id number]    Recommendations:     Discharge Recommendations:  nursing facility, skilled   Discharge Equipment Recommendations:  (TBD)   Barriers to discharge: Inaccessible home and Decreased caregiver support    Assessment:     Rikki Morrison is a 77 y.o. male admitted with a medical diagnosis of Penile swelling.  He presents with the following impairments/functional limitations:  weakness, impaired endurance, impaired functional mobilty, impaired self care skills, gait instability, impaired balance, decreased upper extremity function, decreased lower extremity function, impaired cognition, decreased coordination, impaired coordination, decreased safety awareness, decreased ROM, abnormal tone ; pt with fair mobility today, pt needing max.A x 2 for t/f's and modA to occasional SBA for static sitting balance. Pt. Needing cueing throughout entire session, and unable to follow commands well.    Rehab Prognosis:  fair; patient would benefit from acute skilled PT services to address these deficits and reach maximum level of function.      Recent Surgery: * No surgery found *      Plan:     During this hospitalization, patient to be seen 6 x/week to address the above listed problems via gait training, therapeutic activities, therapeutic exercises, neuromuscular re-education  · Plan of Care Expires:  02/07/18   Plan of Care Reviewed with: patient    Subjective     Communicated with nurse prior to session.  Patient found supine in bed, son present and feeding pt upon PT entry to room, agreeable to treatment.      Chief Complaint: pt had no c/o's  Patient comments/goals: pt said nothing, though awake/alert for tx session.     Pain/Comfort:  · Pain Rating 1:  (pt did not exhibit signs of pain)  · Pain Rating Post-Intervention 1: 0/10    Patients cultural, spiritual, Alevism conflicts given the current situation: n/a    Objective:     Patient found  with: bed alarm, peripheral IV, telemetry, Condom Catheter, SCD (Avasys monitor in room)     General Precautions: Standard, anti-coagulation medicine, fall, hearing impaired (elevate HOB)   Orthopedic Precautions:N/A   Braces: N/A     Functional Mobility:  · Bed Mobility:     · Rolling Right: moderate assistance  · Scooting: minimum assistance and in supine with bed flat and trendelenburged  · Supine to Sit: maximal assistance and of 2 persons  · Sit to Supine: moderate assistance and of 2 persons  · Transfers:     · Sit to Stand:  maximal assistance and of 2 persons with no AD and rolling walker  · Balance: pt with poor sitting balance, req'd mod/max.A for majority of time, occasional SBA  for a few seconds      AM-PAC 6 CLICK MOBILITY  Turning over in bed (including adjusting bedclothes, sheets and blankets)?: 2  Sitting down on and standing up from a chair with arms (e.g., wheelchair, bedside commode, etc.): 2  Moving from lying on back to sitting on the side of the bed?: 2  Moving to and from a bed to a chair (including a wheelchair)?: 2  Need to walk in hospital room?: 1  Climbing 3-5 steps with a railing?: 1  Total Score: 10       Therapeutic Activities and Exercises:   pt. Perf'd/recv'd supine LE ex's of heelslides, hip abd/add x 10 ea.   OT perf'd UE ex's  As well.   Pt sat up ~20 min. Total time at EOB, attempted standing act's 3x's with and without AD, worked on feeding with OT.    Patient left HOB elevated with all lines intact, call button in reach, bed alarm on and nurse notified. (condom catheter also came off during session, ns notified)    GOALS:    Physical Therapy Goals        Problem: Physical Therapy Goal    Goal Priority Disciplines Outcome Goal Variances Interventions   Physical Therapy Goal     PT/OT, PT Ongoing (interventions implemented as appropriate)     Description:  Goals to be met by: 18     Patient will increase functional independence with mobility by performin. Supine to  sit with Min A.   2. Sit to supine with min A.   3. Sit<>stand transfer with Min A using rolling walker.   4. Gait > 50 feet with Min A using rolling walker.   5. SPT from EOB < chair with or without AD and min A                    Time Tracking:     PT Received On: 01/11/18  PT Start Time: 1315     PT Stop Time: 1340  PT Total Time (min): 25 min     Billable Minutes: Therapeutic Activity 12 (co-tx with OT 1 unit)    Treatment Type: Treatment  PT/PTA: PTA     PTA Visit Number: 3     Leila Mackay, PTA  01/11/2018

## 2018-01-11 NOTE — PLAN OF CARE
Problem: Patient Care Overview  Goal: Plan of Care Review  Outcome: Ongoing (interventions implemented as appropriate)   01/11/18 0529   Coping/Psychosocial   Plan Of Care Reviewed With patient       Comments: Plan of care reviewed with patient at the bedside. Disoriented x4. Reports no pain. Reviewed fall risk with patient. Bed alarm on and call bell within reach. Avasis in use for additional monitoring. Reviewed risk for pressure ulcer. Patient turned and positioned with pillow Q2H. Remains free from injury. Will continue to monitor.    Taryn Ortega RN  1/11/2018  5:31 AM

## 2018-01-11 NOTE — PLAN OF CARE
5054-Writer met with patient's son at his request in regards to placement status. Writer informed son Clifton that [facility] ([facility]) sent patient's case to Medical Review for SNF and per [facility] he may have to return on long-term care. Son understands plan and requests to be contact [phone] instead as patient's wife just got out of surgery today.    1400-Writer called Smita from [facility] [phone] to inquire about authorization as Darcy from [facility] informed us patient is will in [facility] ([phone]). Writer left voicemail for Smita to return call.

## 2018-01-12 VITALS
SYSTOLIC BLOOD PRESSURE: 133 MMHG | HEIGHT: 71 IN | DIASTOLIC BLOOD PRESSURE: 60 MMHG | BODY MASS INDEX: 22.75 KG/M2 | RESPIRATION RATE: 20 BRPM | OXYGEN SATURATION: 94 % | HEART RATE: 84 BPM | TEMPERATURE: 98 F | WEIGHT: 162.5 LBS

## 2018-01-12 LAB
ALBUMIN SERPL BCP-MCNC: 3.1 G/DL
ALP SERPL-CCNC: 109 U/L
ALT SERPL W/O P-5'-P-CCNC: 12 U/L
ANION GAP SERPL CALC-SCNC: 6 MMOL/L
AST SERPL-CCNC: 16 U/L
BASOPHILS # BLD AUTO: 0.05 K/UL
BASOPHILS NFR BLD: 0.4 %
BILIRUB SERPL-MCNC: 0.6 MG/DL
BUN SERPL-MCNC: 9 MG/DL
CALCIUM SERPL-MCNC: 10.4 MG/DL
CHLORIDE SERPL-SCNC: 108 MMOL/L
CO2 SERPL-SCNC: 24 MMOL/L
CREAT SERPL-MCNC: 0.8 MG/DL
DIFFERENTIAL METHOD: ABNORMAL
EOSINOPHIL # BLD AUTO: 0.8 K/UL
EOSINOPHIL NFR BLD: 6.6 %
ERYTHROCYTE [DISTWIDTH] IN BLOOD BY AUTOMATED COUNT: 14.1 %
EST. GFR  (AFRICAN AMERICAN): >60 ML/MIN/1.73 M^2
EST. GFR  (NON AFRICAN AMERICAN): >60 ML/MIN/1.73 M^2
GLUCOSE SERPL-MCNC: 122 MG/DL
HCT VFR BLD AUTO: 41 %
HGB BLD-MCNC: 13.3 G/DL
INR PPP: 2.3
LYMPHOCYTES # BLD AUTO: 2.6 K/UL
LYMPHOCYTES NFR BLD: 22 %
MAGNESIUM SERPL-MCNC: 2.1 MG/DL
MCH RBC QN AUTO: 29.8 PG
MCHC RBC AUTO-ENTMCNC: 32.4 G/DL
MCV RBC AUTO: 92 FL
MONOCYTES # BLD AUTO: 1.2 K/UL
MONOCYTES NFR BLD: 10.5 %
NEUTROPHILS # BLD AUTO: 7 K/UL
NEUTROPHILS NFR BLD: 60.2 %
PHOSPHATE SERPL-MCNC: 2.8 MG/DL
PLATELET # BLD AUTO: 233 K/UL
PMV BLD AUTO: 9.6 FL
POTASSIUM SERPL-SCNC: 4.4 MMOL/L
PROT SERPL-MCNC: 6.2 G/DL
PROTHROMBIN TIME: 24.4 SEC
RBC # BLD AUTO: 4.46 M/UL
SODIUM SERPL-SCNC: 138 MMOL/L
WBC # BLD AUTO: 11.68 K/UL

## 2018-01-12 PROCEDURE — 25000003 PHARM REV CODE 250: Performed by: NURSE PRACTITIONER

## 2018-01-12 PROCEDURE — G8989 SELF CARE D/C STATUS: HCPCS | Mod: CM

## 2018-01-12 PROCEDURE — G0378 HOSPITAL OBSERVATION PER HR: HCPCS

## 2018-01-12 PROCEDURE — 97530 THERAPEUTIC ACTIVITIES: CPT

## 2018-01-12 PROCEDURE — 97116 GAIT TRAINING THERAPY: CPT

## 2018-01-12 PROCEDURE — G8980 MOBILITY D/C STATUS: HCPCS | Mod: CL

## 2018-01-12 PROCEDURE — 99900035 HC TECH TIME PER 15 MIN (STAT)

## 2018-01-12 PROCEDURE — 85610 PROTHROMBIN TIME: CPT

## 2018-01-12 PROCEDURE — 25000003 PHARM REV CODE 250: Performed by: HOSPITALIST

## 2018-01-12 PROCEDURE — G8988 SELF CARE GOAL STATUS: HCPCS | Mod: CN

## 2018-01-12 PROCEDURE — 84100 ASSAY OF PHOSPHORUS: CPT

## 2018-01-12 PROCEDURE — G8978 MOBILITY CURRENT STATUS: HCPCS | Mod: CL

## 2018-01-12 PROCEDURE — 83735 ASSAY OF MAGNESIUM: CPT

## 2018-01-12 PROCEDURE — 99217 PR OBSERVATION CARE DISCHARGE: CPT | Mod: ,,, | Performed by: HOSPITALIST

## 2018-01-12 PROCEDURE — G8979 MOBILITY GOAL STATUS: HCPCS | Mod: CI

## 2018-01-12 PROCEDURE — 80053 COMPREHEN METABOLIC PANEL: CPT

## 2018-01-12 PROCEDURE — 85025 COMPLETE CBC W/AUTO DIFF WBC: CPT

## 2018-01-12 PROCEDURE — 36415 COLL VENOUS BLD VENIPUNCTURE: CPT

## 2018-01-12 PROCEDURE — 94761 N-INVAS EAR/PLS OXIMETRY MLT: CPT

## 2018-01-12 RX ADMIN — AMLODIPINE BESYLATE 2.5 MG: 2.5 TABLET ORAL at 10:01

## 2018-01-12 RX ADMIN — PANTOPRAZOLE SODIUM 40 MG: 40 TABLET, DELAYED RELEASE ORAL at 10:01

## 2018-01-12 RX ADMIN — POTASSIUM CITRATE 10 MEQ: 5 TABLET ORAL at 10:01

## 2018-01-12 RX ADMIN — CIPROFLOXACIN 500 MG: 500 TABLET, FILM COATED ORAL at 10:01

## 2018-01-12 RX ADMIN — QUETIAPINE FUMARATE 25 MG: 25 TABLET, FILM COATED ORAL at 10:01

## 2018-01-12 RX ADMIN — SODIUM CHLORIDE: 0.9 INJECTION, SOLUTION INTRAVENOUS at 01:01

## 2018-01-12 RX ADMIN — MEMANTINE HYDROCHLORIDE 10 MG: 10 TABLET ORAL at 10:01

## 2018-01-12 RX ADMIN — POTASSIUM CITRATE 10 MEQ: 5 TABLET ORAL at 01:01

## 2018-01-12 RX ADMIN — POTASSIUM CITRATE 10 MEQ: 5 TABLET ORAL at 05:01

## 2018-01-12 RX ADMIN — VITAMIN D, TAB 1000IU (100/BT) 1000 UNITS: 25 TAB at 10:01

## 2018-01-12 RX ADMIN — FINASTERIDE 5 MG: 5 TABLET, FILM COATED ORAL at 10:01

## 2018-01-12 RX ADMIN — OMEGA-3 FATTY ACIDS CAP DELAYED RELEASE 1000 MG 1 CAPSULE: 1000 CAPSULE DELAYED RELEASE at 10:01

## 2018-01-12 RX ADMIN — METOPROLOL SUCCINATE 50 MG: 50 TABLET, EXTENDED RELEASE ORAL at 06:01

## 2018-01-12 RX ADMIN — FERROUS SULFATE TAB EC 325 MG (65 MG FE EQUIVALENT) 325 MG: 325 (65 FE) TABLET DELAYED RESPONSE at 10:01

## 2018-01-12 RX ADMIN — WARFARIN SODIUM 5 MG: 5 TABLET ORAL at 05:01

## 2018-01-12 NOTE — ASSESSMENT & PLAN NOTE
- UA appears consistent with infection  - Urine culture no significant growth.   - Empiric Rocephin 3 days  - Cipro to complete course antibiotic day #5, through 1/14/18.   - UA shows some improvement.  - Given takes coumadin and potential for drug interaction, will consider another 3 days Cipro empirically as outpatient.

## 2018-01-12 NOTE — PLAN OF CARE
CM notified by Rayne Tom from [facility] that pt has been denied SNF auth by MRU. CM then called [facility] to see if pt could come as [facility]. ANKUSH Jones spoke with Eileen at [facility], [phone],earlier this week and was informed that spouse was paying to hold bed and that pt could return either as SNF or [facility]. This CM is waiting from a call back to determine dispo.  CM to follow and remain supportive.

## 2018-01-12 NOTE — PLAN OF CARE
DC DISPO:    Patient to D/C to [facility] via [facility] wheelchair van. RN to call report to [phone] and ask for Nurs[facility] on 1st floor (Room #116). [facility] ETA 1700. CM to remain supportive.      01/12/18 1635   Final Note   Assessment Type Final Discharge Note   Discharge Disposition Nurse  ([facility] )   What phone number can be called within the next 1-3 days to see how you are doing after discharge? [phone]   Hospital Follow Up  Appt(s) scheduled? Yes   Discharge plans and expectations educations in teach back method with documentation complete? Yes

## 2018-01-12 NOTE — ASSESSMENT & PLAN NOTE
- UA appears consistent with infection  - Urine culture no significant growth.   - Empiric Rocephin 3 days  - Cipro to complete course antibiotic day #5  - UA shows some improvement.  - Given takes coumadin and potential for drug interaction, will consider another 3 days Cipro empirically as outpatient.

## 2018-01-12 NOTE — PLAN OF CARE
1536-Writer spoke with Anjelica from [facility] who informed writer they can receive patient as long as documentation is reviewed by 1800. Writer sent intermediate NH orders and DP rehab packet to [facility] via [facility] and fax [phone].

## 2018-01-12 NOTE — PT/OT/SLP PROGRESS
Occupational Therapy   Treatment    Name: Rikki Morrison  MRN: [id number]  Admitting Diagnosis:  Penile swelling       Recommendations:     Discharge Recommendations: nursing facility, skilled  Discharge Equipment Recommendations:   (TBD)  Barriers to discharge:  Other (Comment) (increased caregiver burden)    Subjective     Communicated with: RN prior to session.  Pain/Comfort:  · Pain Rating 1:  (no grimacing noted)  · Pain Rating Post-Intervention 1:  (appeared comfortable)    Objective:     Patient found with: SCD, telemetry, peripheral IV, bed alarm (AVAsys)    General Precautions: Standard, anti-coagulation medicine, fall, other (see comments) (elevate HOB)   Orthopedic Precautions:N/A   Braces: N/A     Occupational Performance:    Bed Mobility:    · Patient completed Supine to Sit with total assistance  · Patient completed Sit to Supine with total assistance     Functional Mobility/Transfers:  · NT secondary to safety    Activities of Daily Living:  · Unable to actively engage in tasks on this date    Patient left HOB elevated with all lines intact, call button in reach, bed alarm on, RN notified and wife present    [facility] 6 Click:  [facility] Total Score: 7    Treatment & Education:  Discussed session performance with wife. Verbalized understanding.  Education:    Assessment:     Rikki Morrison is a 77 y.o. male with a medical diagnosis of Penile swelling.  He presents with performance deficits affecting function are weakness, impaired endurance, impaired self care skills, impaired functional mobilty, gait instability, impaired balance, impaired cognition, decreased upper extremity function, decreased lower extremity function, decreased safety awareness, abnormal tone, decreased ROM. No progress towards goals.  Cough up large amount of mucus, RN  Informed. Patient with eyes closed despite sternal stimuli, eyes only opened briefly with max cues and unable to engage in basic ADL tasks.  Sat EOB x approx 10 min  with max assist.  Continue OT services to maximize patient performance and reduce caregiver burden.    Rehab Prognosis:  Fair; patient would benefit from acute skilled OT services to address these deficits and reach maximum level of function.       Plan:     Patient to be seen 5 x/week to address the above listed problems via self-care/home management, therapeutic activities, therapeutic exercises  · Plan of Care Expires: 02/09/18  · Plan of Care Reviewed with: patient, spouse    This Plan of care has been discussed with the patient who was involved in its development and understands and is in agreement with the identified goals and treatment plan    GOALS:    Occupational Therapy Goals        Problem: Occupational Therapy Goal    Goal Priority Disciplines Outcome Interventions   Occupational Therapy Goal     OT, PT/OT Ongoing (interventions implemented as appropriate)    Description:  Goals to be met by: 1/16/2018    Patient will increase functional independence with ADLs by performing:    Feeding with Maximum Assistance.  Grooming while seated with Maximum Assistance.  Stand pivot transfers with Moderate Assistance.  Upper extremity exercise program x10 reps per handout, with assistance as needed.                      Time Tracking:     OT Date of Treatment: 01/12/18  OT Start Time: 1357  OT Stop Time: 1417  OT Total Time (min): 20 min    Billable Minutes:Therapeutic Activity 20    AZEEM Pope  1/12/2018

## 2018-01-12 NOTE — PROGRESS NOTES
"Floor nurse, Christa, requested I look at a "wound" on this patient's right elbow. Naila Sherman, Wound Care Nurse, noted in her documentation on 1-8-2018, patient had a crusted dry area on the right elbow, measuring 0.8 cm X 0.8 cm. Also noted, the patient uses his elbow to help push himself out of the chair. Since then the crusted area had come off revealing a pink , dry base, wound edges intact, and geoffrey-wound area slightly red. No drainage, no odor noted. Cleaned with wound cleanser, dried, and covered with a Mepilex Bordered [place].     Naila had already written wound care recommendations to keep this area covered with a Mepilex Bordered Gauze. No lydia to write additional orders at this time.    Екатерина Eason RN, CWOCN    Right Elbow        "

## 2018-01-12 NOTE — PT/OT/SLP PROGRESS
Physical Therapy Treatment    Patient Name:  Rikki Morrison   MRN:  [id number]    Recommendations:     Discharge Recommendations:  nursing facility, skilled   Discharge Equipment Recommendations: none   Barriers to discharge: None    Assessment:     Rikki Morrison is a 77 y.o. male admitted with a medical diagnosis of Penile swelling.  He presents with the following impairments/functional limitations:  weakness, impaired endurance, impaired balance, impaired functional mobilty, decreased lower extremity function, decreased upper extremity function, impaired cognition . Pt alert and responsive but not following commands well.     Rehab Prognosis:  poor; patient would benefit from acute skilled PT services to address these deficits and reach maximum level of function.      Recent Surgery: * No surgery found *      Plan:     During this hospitalization, patient to be seen 6 x/week to address the above listed problems via gait training, therapeutic activities, therapeutic exercises  · Plan of Care Expires:  02/07/18   Plan of Care Reviewed with: patient, spouse    Subjective     Communicated with patient prior to session.  Patient found supine in bed sleeping woke up when talking to wife upon PT entry to room, agreeable to treatment.      Chief Complaint: not able to articulate  Patient comments/goals: not able to articulate   Pain/Comfort:  ·      Patients cultural, spiritual, Sikhism conflicts given the current situation: n/a    Objective:     Patient found with: SCD, peripheral IV, bed alarm     General Precautions: Standard, anti-coagulation medicine, fall   Orthopedic Precautions:N/A   Braces: N/A     Functional Mobility:  · Bed Mobility:     · Rolling Right: minimum assistance  · Scooting: moderate assistance  · Supine to Sit: moderate assistance sitting EOB with min A able to sit brief periods without assistance      AM-PAC 6 CLICK MOBILITY  Turning over in bed (including adjusting bedclothes, sheets and  blankets)?: 2  Sitting down on and standing up from a chair with arms (e.g., wheelchair, bedside commode, etc.): 2  Moving from lying on back to sitting on the side of the bed?: 2  Moving to and from a bed to a chair (including a wheelchair)?: 2  Need to walk in hospital room?: 2  Climbing 3-5 steps with a railing?: 2  Total Score: 12         Patient left with bed in chair position with all lines intact, call button in reach, bed alarm on, nurse notified and wife present..    GOALS:    Physical Therapy Goals        Problem: Physical Therapy Goal    Goal Priority Disciplines Outcome Goal Variances Interventions   Physical Therapy Goal     PT/OT, PT Ongoing (interventions implemented as appropriate)     Description:  Goals to be met by: 18     Patient will increase functional independence with mobility by performin. Supine to sit with Min A.   2. Sit to supine with min A.   3. Sit<>stand transfer with Min A using rolling walker.   4. Gait > 50 feet with Min A using rolling walker.   5. SPT from EOB < chair with or without AD and min A                    Time Tracking:     PT Received On: 18  PT Start Time: 1440     PT Stop Time: 1455  PT Total Time (min): 15 min     Billable Minutes: Therapeutic Activity 15    Treatment Type: Treatment  PT/PTA: PT     PTA Visit Number: 3     Darek Keith, PT  2018

## 2018-01-12 NOTE — PROGRESS NOTES
"[facility] Hospital Medicine  Progress Note    Patient Name: Rikki Morrison  MRN: [id number]  Patient Class: OP- Observation   Admission Date: 1/7/2018  Length of Stay: 0 days  Attending Physician: Cyril Huerta MD  Primary Care Provider: ODALYS Villafuerte MD        Subjective:     Principal Problem:Penile swelling    HPI:  Patient is a 77 y.o. male who presents to the ED via EMS with complaint of penile swelling. Wife reports onset of fever yesterday. Per wife, the patient was given subcutaneous fluids through his abdomen last night by the nursing staff at [facility] for dehydration, which they believed to be the cause of the fever. She reports that he was given subcutaneous fluids today as well. Nursing staff noticed penile swelling when changing the patient's diaper this afternoon. Wife states "they said it was the fluid that went the wrong way." She notes that his diaper was wet when it was changed at 4 PM today, but not at 7 PM. Patient is unable to communicate secondary to baseline dementia, and wife states "he can't tell you anything... he never complains and he didn't make any faces as if he were in pain today."  Wife denies any fever today.       Hospital Course:  No notes on file    Interval History:  Poor historian secondary to dementia.  Arouses.  Discussed with wife at bedside.      Review of Systems   Unable to perform ROS: Dementia     Objective:     Vital Signs (Most Recent):  Temp: 98.3 °F (36.8 °C) (01/12/18 1238)  Pulse: 84 (01/12/18 1238)  Resp: 20 (01/12/18 1238)  BP: 133/60 (01/12/18 1238)  SpO2: (!) 94 % (01/12/18 1238) Vital Signs (24h Range):  Temp:  [97.9 °F (36.6 °C)-98.6 °F (37 °C)] 98.3 °F (36.8 °C)  Pulse:  [71-84] 84  Resp:  [18-20] 20  SpO2:  [94 %-98 %] 94 %  BP: (133-167)/(60-84) 133/60     Weight: 73.7 kg (162 lb 7.7 oz)  Body mass index is 22.66 kg/m².    Intake/Output Summary (Last 24 hours) at 01/12/18 1531  Last data filed at 01/12/18 0500   Gross per " 24 hour   Intake             1620 ml   Output              300 ml   Net             1320 ml      Physical Exam   Constitutional: He appears well-developed and well-nourished.   HENT:   Head: Normocephalic and atraumatic.   Eyes: Conjunctivae are normal. Pupils are equal, round, and reactive to light.   Neck: Normal range of motion. Neck supple.   Cardiovascular: Normal rate and regular rhythm.    Pulmonary/Chest: Effort normal and breath sounds normal.   Abdominal: Soft.   Genitourinary:   Genitourinary Comments: Penile edema resolved..      Neurological: He is alert.   Skin: Skin is warm and dry.       Significant Labs:   BMP:   Recent Labs  Lab 01/12/18  0455   *      K 4.4      CO2 24   BUN 9   CREATININE 0.8   CALCIUM 10.4   MG 2.1     CBC:   Recent Labs  Lab 01/11/18  0328 01/12/18  0455   WBC 9.46 11.68   HGB 12.1* 13.3*   HCT 36.9* 41.0    233       Significant Imaging: I have reviewed all pertinent imaging results/findings within the past 24 hours.    Assessment/Plan:      * Penile swelling    Patient received 1.8L subq infusion of fluid for dehydration. Penile swelling was noticed the next day.  - Urology saw patient  - Appears resolved on exam.             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  - 2004: Mitral valve replacement. [facility] Mechanical Valve.  - 2004: Began warfarin. Anticoagulation managed by Dr. Gamino.               Indication: Mechanical mitral valve and atrial fibrillation              Target INR 2.5-3.0          Urinary tract infection without hematuria    - UA appears consistent with infection  - Urine culture no significant growth.   - Empiric Rocephin 3 days  - Cipro to complete course antibiotic day #5  - UA shows some improvement.  - Given takes coumadin and potential for drug interaction, will consider another 3 days Cipro empirically as outpatient.             Dementia due to  Parkinson's disease without behavioral disturbance    Stable  Continue galantamine, namenda          Chronic anticoagulation    - Continue Coumadin  - INR 1.9 > 2.3 here in hospital on coumadin 5 mg daily.    - Monitor INR as coumadin dose may need adjusting as noted.            Hypercholesterolemia    - Continue Crestor    Results for TO CHAMBERS (MRN [id number]) as of 1/9/2018 16:47   Ref. Range 1/8/2018 04:32   Cholesterol Latest Ref Range: 120 - 199 mg/dL 97 (L)   HDL Latest Ref Range: 40 - 75 mg/dL 19 (L)   LDL Cholesterol Latest Ref Range: 63.0 - 159.0 mg/dL 54.0 (L)   Total Cholesterol/HDL Ratio Latest Ref Range: 2.0 - 5.0  5.1 (H)   Triglycerides Latest Ref Range: 30 - 150 mg/dL 120           Type 2 diabetes mellitus without complication, without long-term current use of insulin    Diet controlled  A1c = 5.8          Permanent atrial fibrillation    Chronic afib  Continue ASA, warfarin  Per cardiology note 12/8/17:  2004: Began warfarin. Anticoagulation managed by Dr. Gamino.               Indication: Mechanical mitral valve and atrial fibrillation              Target INR 2.5-3.0              On warfarin and aspirin 81 mg Q24.            VTE Risk Mitigation         Ordered     Place sequential compression device  Until discontinued      01/09/18 1427     warfarin (COUMADIN) tablet 5 mg  Daily     Route:  Oral        01/08/18 0106     Medium Risk of VTE  Once      01/08/18 0106     Reason for No Pharmacological VTE Prophylaxis  Once      01/08/18 0106     Place MYRTLE hose  Until discontinued      01/08/18 0106              Cyril Huerta MD  Department of Hospital Medicine   [facility]

## 2018-01-12 NOTE — PLAN OF CARE
Problem: Patient Care Overview  Goal: Plan of Care Review  Outcome: Ongoing (interventions implemented as appropriate)   01/12/18 0552   Coping/Psychosocial   Plan Of Care Reviewed With patient       Comments: Plan of care reviewed with patient at the bedside. Disoriented x4. Reports no pain. Reviewed fall risk with patient. Bed alarm on and call bell within reach. Avasis in use for additional monitoring. Reviewed risk for pressure ulcer. Patient turned and positioned with pillow. Remains free from injury. Possible discharge today pending placement and approval. Will continue to monitor.    Taryn Ortega RN  1/12/2018  5:54 AM

## 2018-01-12 NOTE — PLAN OF CARE
Problem: Physical Therapy Goal  Goal: Physical Therapy Goal  Goals to be met by: 18     Patient will increase functional independence with mobility by performin. Supine to sit with Min A.   2. Sit to supine with min A.   3. Sit<>stand transfer with Min A using rolling walker.   4. Gait > 50 feet with Min A using rolling walker.   5. SPT from EOB < chair with or without AD and min A   -    Comments: Pt alert and responsive but not following commands well.

## 2018-01-12 NOTE — DISCHARGE SUMMARY
"[facility] Hospital Medicine  Discharge Summary      Patient Name: Rikki Morrison  MRN: [id number]  Admission Date: 1/7/2018  Hospital Length of Stay: 0 days  Discharge Date and Time:  01/12/2018 3:45 PM  Attending Physician: Melony Perez MD   Discharging Provider: Melony Perez MD  Primary Care Provider: ODALYS Villafuerte MD      HPI:   Patient is a 77 y.o. male who presents to the ED via EMS with complaint of penile swelling. Wife reports onset of fever yesterday. Per wife, the patient was given subcutaneous fluids through his abdomen last night by the nursing staff at [facility] for dehydration, which they believed to be the cause of the fever. She reports that he was given subcutaneous fluids today as well. Nursing staff noticed penile swelling when changing the patient's diaper this afternoon. Wife states "they said it was the fluid that went the wrong way." She notes that his diaper was wet when it was changed at 4 PM today, but not at 7 PM. Patient is unable to communicate secondary to baseline dementia, and wife states "he can't tell you anything... he never complains and he didn't make any faces as if he were in pain today."  Wife denies any fever today.       * No surgery found *      Hospital Course:   No notes on file     Consults:   Consults         Status Ordering Provider     Inpatient consult to Social Work/Case Management  Once     Provider:  (Not yet assigned)    Completed MELONY PEREZ     Inpatient consult to Urology  Once     Provider:  (Not yet assigned)    Completed RUTH BABCOCK          * Penile swelling    Patient received 1.8L subq infusion of fluid for dehydration. Penile swelling was noticed the next day.  - Urology saw patient  - Appears resolved on exam.             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  - 2004: Mitral valve replacement. [employer] Cyril Mechanical Valve.  - 2004: Began " warfarin. Anticoagulation managed by Dr. Gamino.               Indication: Mechanical mitral valve and atrial fibrillation              Target INR 2.5-3.0          Urinary tract infection without hematuria    - UA appears consistent with infection  - Urine culture no significant growth.   - Empiric Rocephin 3 days  - Cipro to complete course antibiotic day #5, through 1/14/18.   - UA shows some improvement.  - Given takes coumadin and potential for drug interaction, will consider another 3 days Cipro empirically as outpatient.             Dementia due to Parkinson's disease without behavioral disturbance    Stable  Continue galantamine, namenda          Chronic anticoagulation    - Continue Coumadin  - INR 1.9 > 2.3 here in hospital on coumadin 5 mg daily.    - Monitor INR as coumadin dose may need adjusting as noted.            Hypercholesterolemia    - Continue Crestor    Results for TO CHAMBERS (MRN [id number]) as of 1/9/2018 16:47   Ref. Range 1/8/2018 04:32   Cholesterol Latest Ref Range: 120 - 199 mg/dL 97 (L)   HDL Latest Ref Range: 40 - 75 mg/dL 19 (L)   LDL Cholesterol Latest Ref Range: 63.0 - 159.0 mg/dL 54.0 (L)   Total Cholesterol/HDL Ratio Latest Ref Range: 2.0 - 5.0  5.1 (H)   Triglycerides Latest Ref Range: 30 - 150 mg/dL 120           Permanent atrial fibrillation    Chronic afib  Continue ASA, warfarin  Per cardiology note 12/8/17:  2004: Began warfarin. Anticoagulation managed by Dr. Gamino.               Indication: Mechanical mitral valve and atrial fibrillation              Target INR 2.5-3.0              On warfarin and aspirin 81 mg Q24.            Final Active Diagnoses:    Diagnosis Date Noted POA    PRINCIPAL PROBLEM:  Penile swelling [N48.89] 01/07/2018 Yes    H/O mitral valve replacement with mechanical valve [Z95.2] 01/10/2018 Not Applicable    Urinary tract infection without hematuria [N39.0] 01/08/2018 Yes    Dementia due to Parkinson's disease without behavioral  disturbance [G20, F02.80] 09/12/2017 Yes    Chronic anticoagulation [Z79.01] 12/19/2016 Not Applicable    Hypercholesterolemia [E78.00] 06/25/2013 Yes    Permanent atrial fibrillation [I48.2] 11/12/2012 Yes    Type 2 diabetes mellitus without complication, without long-term current use of insulin [E11.9] 11/12/2012 Yes      Problems Resolved During this Admission:    Diagnosis Date Noted Date Resolved POA       Discharged Condition: stable    Disposition: Nursing Facility    Follow Up:  Follow-up Information     ODALYS Villafuerte MD In 2 weeks.    Specialty:  Internal Medicine  Contact information:  [address]  [address]  [place] [place] [facility] [address]  [phone]             Sherita Gamino MD In 1 week.    Specialty:  Cardiology  Contact information:  [address] [id number] [phone]                 Patient Instructions:     Activity as tolerated     Notify your health care provider if you experience any of the following:  increased confusion or weakness     Notify your health care provider if you experience any of the following:  persistent dizziness, light-headedness, or visual disturbances     Notify your health care provider if you experience any of the following:  worsening rash     Notify your health care provider if you experience any of the following:  severe persistent headache     Notify your health care provider if you experience any of the following:  difficulty breathing or increased cough     Notify your health care provider if you experience any of the following:  severe uncontrolled pain     Notify your health care provider if you experience any of the following:  persistent nausea and vomiting or diarrhea     Notify your health care provider if you experience any of the following:  temperature >100.4         Significant Diagnostic Studies:   Imaging Results    None         Pending Diagnostic Studies:     None         Medications:  Reconciled Home Medications:   Current  Discharge Medication List      START taking these medications    Details   ciprofloxacin HCl (CIPRO) 500 MG tablet Take 1 tablet (500 mg total) by mouth every 12 (twelve) hours.    Associated Diagnoses: Acute cystitis without hematuria         CONTINUE these medications which have NOT CHANGED    Details   amlodipine (NORVASC) 2.5 MG tablet TAKE ONE TABLET BY MOUTH ONCE DAILY  Qty: 90 tablet, Refills: 3      aspirin (ECOTRIN) 81 MG EC tablet Take 1 tablet (81 mg total) by mouth once daily.  Qty: 90 tablet, Refills: 3    Associated Diagnoses: Permanent atrial fibrillation      cyanocobalamin, vitamin B-12, 1,000 mcg Subl Place 1 tablet under the tongue once daily.      ferrous sulfate 325 (65 FE) MG EC tablet Take 325 mg by mouth once daily. at dinner      finasteride (PROSCAR) 5 mg tablet TAKE ONE TABLET BY MOUTH ONCE DAILY  Qty: 90 tablet, Refills: 3    Associated Diagnoses: Benign non-nodular prostatic hyperplasia with lower urinary tract symptoms      galantamine (RAZADYNE) 8 MG Tab Take 8 mg by mouth every evening.       memantine (NAMENDA) 10 MG Tab Take 10 mg by mouth 2 (two) times daily.       metoprolol succinate (TOPROL-XL) 50 MG 24 hr tablet TAKE ONE TABLET BY MOUTH ONCE DAILY  Qty: 90 tablet, Refills: 3    Associated Diagnoses: Permanent atrial fibrillation      omeprazole (PRILOSEC) 20 MG capsule TAKE ONE CAPSULE BY MOUTH TWICE DAILY  Qty: 180 capsule, Refills: 3      rosuvastatin (CRESTOR) 5 MG tablet Take 1 tablet (5 mg total) by mouth every evening.  Qty: 90 tablet, Refills: 3    Associated Diagnoses: Hypercholesterolemia      trospium (SANCTURA XR) 60 mg Cp24 capsule Take 60 mg by mouth every morning.       vortioxetine (TRINTELLIX) 10 mg Tab Take 10 mg by mouth once daily.      warfarin (COUMADIN) 5 MG tablet TAKE ONE TABLET BY MOUTH ONCE DAILY AT BEDTIME  Qty: 90 tablet, Refills: 5    Associated Diagnoses: Mitral valve disease; Permanent atrial fibrillation; Chronic anticoagulation       cholecalciferol, vitamin D3, (VITAMIN D3) 1,000 unit capsule Take 1,000 Units by mouth once daily.      fish oil-omega-3 fatty acids 300-1,000 mg capsule Take 1 g by mouth once daily.      nitroGLYCERIN (NITROSTAT) 0.4 MG SL tablet Place 1 tablet (0.4 mg total) under the tongue every 5 (five) minutes as needed for Chest pain.  Qty: 10 tablet, Refills: 1      potassium citrate (UROCIT-K) 10 mEq (1,080 mg) TbSR Take 1 tablet (10 mEq total) by mouth 3 (three) times daily with meals.  Qty: 90 tablet, Refills: 1      QUEtiapine (SEROQUEL) 25 MG Tab Take 25 mg by mouth once daily. 1 hour prior to bed time      UBIDECARENONE (COENZYME Q10) 200 mg Tab Take 1 capsule by mouth once daily.              Indwelling Lines/Drains at time of discharge:   Lines/Drains/Airways     Pressure Ulcer                 Pressure Ulcer 01/08/18 0900 Right elbow Unstageable 4 days                Time spent on the discharge of patient: > 30 minutes  Patient was seen and examined on the date of discharge and determined to be suitable for discharge.         Cyril Huerta MD  Department of Hospital Medicine  [facility]

## 2018-01-12 NOTE — PLAN OF CARE
Problem: Occupational Therapy Goal  Goal: Occupational Therapy Goal  Goals to be met by: 1/16/2018    Patient will increase functional independence with ADLs by performing:    Feeding with Maximum Assistance.  Grooming while seated with Maximum Assistance.  Stand pivot transfers with Moderate Assistance.  Upper extremity exercise program x10 reps per handout, with assistance as needed.     Outcome: Ongoing (interventions implemented as appropriate)  No progress towards goals.  Patient with eyes closed despite sternal stimuli, eyes only opened briefly with max cues and unable to engage in basic ADL tasks.  Sat EOB x approx 10 min with max assist.  Continue OT services to maximize patient performance and reduce caregiver burden.    Comments: AZEEM Pope, 1/12/2018

## 2018-01-12 NOTE — ASSESSMENT & PLAN NOTE
- Continue Coumadin  - INR 1.9 > 2.3 here in hospital on coumadin 5 mg daily.    - Monitor INR as coumadin dose may need adjusting as noted.

## 2018-01-12 NOTE — PLAN OF CARE
CM arranged transport with [facility] wheelchair van. CM spoke to Alyssa at [facility], pick-up is for 1730 per [facility]. [facility] number is [phone]. Spouse informed and is in agreement with plan. [facility] informed that spouse will ride with pt in van. RN informed to call report to [phone]. No further CM needs for discharge.

## 2018-01-12 NOTE — PLAN OF CARE
1040-Writer called Rayne Cooney from [facility] [phone] to check on status of SNF authorization. Per Rayen, no decision from [facility] has been made at that time. Rayne to call writer when U reaches a decision.

## 2018-01-12 NOTE — ASSESSMENT & PLAN NOTE
- Continue Crestor    Results for TO CHAMBERS (MRN [id number]) as of 1/9/2018 16:47   Ref. Range 1/8/2018 04:32   Cholesterol Latest Ref Range: 120 - 199 mg/dL 97 (L)   HDL Latest Ref Range: 40 - 75 mg/dL 19 (L)   LDL Cholesterol Latest Ref Range: 63.0 - 159.0 mg/dL 54.0 (L)   Total Cholesterol/HDL Ratio Latest Ref Range: 2.0 - 5.0  5.1 (H)   Triglycerides Latest Ref Range: 30 - 150 mg/dL 120

## 2018-01-12 NOTE — SUBJECTIVE & OBJECTIVE
Interval History:  Poor historian secondary to dementia.  Arouses.  Discussed with wife at bedside.      Review of Systems   Unable to perform ROS: Dementia     Objective:     Vital Signs (Most Recent):  Temp: 98.3 °F (36.8 °C) (01/12/18 1238)  Pulse: 84 (01/12/18 1238)  Resp: 20 (01/12/18 1238)  BP: 133/60 (01/12/18 1238)  SpO2: (!) 94 % (01/12/18 1238) Vital Signs (24h Range):  Temp:  [97.9 °F (36.6 °C)-98.6 °F (37 °C)] 98.3 °F (36.8 °C)  Pulse:  [71-84] 84  Resp:  [18-20] 20  SpO2:  [94 %-98 %] 94 %  BP: (133-167)/(60-84) 133/60     Weight: 73.7 kg (162 lb 7.7 oz)  Body mass index is 22.66 kg/m².    Intake/Output Summary (Last 24 hours) at 01/12/18 1531  Last data filed at 01/12/18 0500   Gross per 24 hour   Intake             1620 ml   Output              300 ml   Net             1320 ml      Physical Exam   Constitutional: He appears well-developed and well-nourished.   HENT:   Head: Normocephalic and atraumatic.   Eyes: Conjunctivae are normal. Pupils are equal, round, and reactive to light.   Neck: Normal range of motion. Neck supple.   Cardiovascular: Normal rate and regular rhythm.    Pulmonary/Chest: Effort normal and breath sounds normal.   Abdominal: Soft.   Genitourinary:   Genitourinary Comments: Penile edema resolved..      Neurological: He is alert.   Skin: Skin is warm and dry.       Significant Labs:   BMP:   Recent Labs  Lab 01/12/18  0455   *      K 4.4      CO2 24   BUN 9   CREATININE 0.8   CALCIUM 10.4   MG 2.1     CBC:   Recent Labs  Lab 01/11/18  0328 01/12/18  0455   WBC 9.46 11.68   HGB 12.1* 13.3*   HCT 36.9* 41.0    233       Significant Imaging: I have reviewed all pertinent imaging results/findings within the past 24 hours.

## 2018-01-13 NOTE — PT/OT/SLP DISCHARGE
Occupational Therapy Discharge Summary    Rikki Morrison  MRN: [id number]   Principal Problem: Penile swelling      Patient Discharged from acute Occupational Therapy on 1/12/18.  Please refer to prior OT note dated 1/12/18 for functional status.    Assessment:      Patient appropriate for care in another setting.    Objective:     GOALS:    Occupational Therapy Goals        Problem: Occupational Therapy Goal    Goal Priority Disciplines Outcome Interventions   Occupational Therapy Goal     OT, PT/OT Ongoing (interventions implemented as appropriate)    Description:  Goals to be met by: 1/16/2018    Patient will increase functional independence with ADLs by performing:    Feeding with Maximum Assistance.  Grooming while seated with Maximum Assistance.  Stand pivot transfers with Moderate Assistance.  Upper extremity exercise program x10 reps per handout, with assistance as needed.                      Reasons for Discontinuation of Therapy Services  Transfer to alternate level of care.      Plan:     Patient Discharged to: Skilled Nursing Facility    AZEEM Roth  1/13/2018

## 2018-01-14 NOTE — PT/OT/SLP DISCHARGE
Physical Therapy Discharge Summary    Name: Rikki Morrison  MRN: [id number]   Principal Problem: Penile swelling     Patient Discharged from acute Physical Therapy on 18.  Please refer to prior PT noted date on 18 for functional status.     Assessment:     Patient appropriate for care in another setting. Patient has not met goals.    Objective:     GOALS:    Physical Therapy Goals        Problem: Physical Therapy Goal    Goal Priority Disciplines Outcome Goal Variances Interventions   Physical Therapy Goal     PT/OT, PT Ongoing (interventions implemented as appropriate)     Description:  Goals to be met by: 18     Patient will increase functional independence with mobility by performin. Supine to sit with Min A.   2. Sit to supine with min A.   3. Sit<>stand transfer with Min A using rolling walker.   4. Gait > 50 feet with Min A using rolling walker.   5. SPT from EOB < chair with or without AD and min A                    Reasons for Discontinuation of Therapy Services  Transfer to alternate level of care.      Plan:     Patient Discharged to: Skilled Nursing Facility.    Kinjal Mejia, PT  2018     PT of record not available for this documentation

## 2018-02-21 ENCOUNTER — TELEPHONE (OUTPATIENT)
Dept: CARDIOLOGY | Facility: CLINIC | Age: 78
End: 2018-02-21

## 2018-03-09 ENCOUNTER — TELEPHONE (OUTPATIENT)
Dept: CARDIOLOGY | Facility: CLINIC | Age: 78
End: 2018-03-09

## 2018-03-09 DIAGNOSIS — Z79.01 CHRONIC ANTICOAGULATION: ICD-10-CM

## 2018-03-09 DIAGNOSIS — Z95.2 HISTORY OF MITRAL VALVE REPLACEMENT: ICD-10-CM

## 2018-03-09 DIAGNOSIS — I48.21 PERMANENT ATRIAL FIBRILLATION: ICD-10-CM

## 2018-03-15 NOTE — PROGRESS NOTES
Subjective:     Rikki Morrison is a 78 y.o. male with hypercholesterolemia and diabetes mellitus type 2. He used to be overweight but lost weight being demented. 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 In 2004 he underwent mitral valve replacement receiving a 33 mm [employer] Cyril valve. He was felt to have chronic atrial fibrillation but has had occasionally been seen to be in sinus rhythm at least in 2018. He was diagnosed with dementia in the spring of 2017 that has since rapidly progressed. He moved in to a nursing home in 12/2018. In late 1/2018 he went back home to be cared for by his wife. He appears to be in no distress. No bleeding.     Atrial Fibrillation   Presents for follow-up visit. Symptoms are negative for bradycardia, chest pain, dizziness, hemodynamic instability, hypertension, hypotension, palpitations, shortness of breath, syncope, tachycardia and weakness. The symptoms have been stable. Past medical history includes atrial fibrillation and hyperlipidemia.   Hyperlipidemia   This is a chronic problem. The current episode started more than 1 year ago. The problem is controlled. Recent lipid tests were reviewed and are normal. Exacerbating diseases include chronic renal disease and diabetes. He has no history of hypothyroidism, liver disease, obesity or nephrotic syndrome. Pertinent negatives include no chest pain, focal sensory loss, focal weakness, leg pain, myalgias or shortness of breath.       Review of Systems   Constitution: Negative for chills, diaphoresis, fever and weakness.   HENT: Negative for nosebleeds.    Eyes: Negative for double vision, vision loss in left eye and vision loss in right eye.   Cardiovascular: Negative for chest pain, claudication, dyspnea on exertion, irregular heartbeat, leg swelling, near-syncope, orthopnea, palpitations, paroxysmal nocturnal dyspnea and syncope.   Respiratory: Negative for cough, hemoptysis, shortness of breath and wheezing.     Endocrine: Negative for cold intolerance and heat intolerance.   Hematologic/Lymphatic: Negative for bleeding problem. Does not bruise/bleed easily.   Skin: Negative for color change and rash.   Musculoskeletal: Negative for back pain, falls and myalgias.   Gastrointestinal: Negative for heartburn, hematemesis, hematochezia, hemorrhoids, jaundice, melena, nausea and vomiting.   Genitourinary: Negative for hematuria.   Neurological: Negative for dizziness, focal weakness, headaches, light-headedness, loss of balance, numbness and vertigo.   Psychiatric/Behavioral: Positive for memory loss. Negative for altered mental status and depression. The patient is nervous/anxious.    Allergic/Immunologic: Negative for hives and persistent infections.       Current Outpatient Prescriptions on File Prior to Visit   Medication Sig Dispense Refill    amlodipine (NORVASC) 2.5 MG tablet TAKE ONE TABLET BY MOUTH ONCE DAILY 90 tablet 3    cholecalciferol, vitamin D3, (VITAMIN D3) 1,000 unit capsule Take 1,000 Units by mouth once daily.      cyanocobalamin, vitamin B-12, 1,000 mcg Subl Place 1 tablet under the tongue once daily.      ferrous sulfate 325 (65 FE) MG EC tablet Take 325 mg by mouth once daily. at dinner      finasteride (PROSCAR) 5 mg tablet TAKE ONE TABLET BY MOUTH ONCE DAILY 90 tablet 3    fish oil-omega-3 fatty acids 300-1,000 mg capsule Take 1 g by mouth once daily.      galantamine (RAZADYNE) 8 MG Tab Take 8 mg by mouth every evening.       memantine (NAMENDA) 10 MG Tab Take 10 mg by mouth 2 (two) times daily.       metoprolol succinate (TOPROL-XL) 50 MG 24 hr tablet TAKE ONE TABLET BY MOUTH ONCE DAILY (Patient taking differently: TAKE ONE TABLET BY MOUTH ONCE DAILY IN THE MORNING) 90 tablet 3    omeprazole (PRILOSEC) 20 MG capsule TAKE ONE CAPSULE BY MOUTH TWICE DAILY (Patient taking differently: TAKE ONE CAPSULE BY MOUTH TWICE DAILY AS NEEDED) 180 capsule 3    potassium citrate (UROCIT-K) 10 mEq (1,080  "mg) TbSR Take 1 tablet (10 mEq total) by mouth 3 (three) times daily with meals. 90 tablet 1    QUEtiapine (SEROQUEL) 25 MG Tab Take 25 mg by mouth once daily. 1 hour prior to bed time      rosuvastatin (CRESTOR) 5 MG tablet Take 1 tablet (5 mg total) by mouth every evening. 90 tablet 3    UBIDECARENONE (COENZYME Q10) 200 mg Tab Take 1 capsule by mouth once daily.       vortioxetine (TRINTELLIX) 10 mg Tab Take 10 mg by mouth once daily.      warfarin (COUMADIN) 5 MG tablet TAKE ONE TABLET BY MOUTH ONCE DAILY AT BEDTIME 90 tablet 5    aspirin (ECOTRIN) 81 MG EC tablet Take 1 tablet (81 mg total) by mouth once daily. (Patient taking differently: Take 81 mg by mouth every evening. ) 90 tablet 3    nitroGLYCERIN (NITROSTAT) 0.4 MG SL tablet Place 1 tablet (0.4 mg total) under the tongue every 5 (five) minutes as needed for Chest pain. 10 tablet 1    [DISCONTINUED] trospium (SANCTURA XR) 60 mg Cp24 capsule Take 60 mg by mouth every morning.        No current facility-administered medications on file prior to visit.        /67   Pulse 81   Ht 5' 11" (1.803 m)   Wt 73.5 kg (162 lb)   BMI 22.59 kg/m²       Objective:     Physical Exam   Constitutional: He is oriented to person, place, and time. He appears well-developed and well-nourished. He does not appear ill. No distress.   HENT:   Head: Normocephalic and atraumatic.   Nose: Nose normal.   Eyes: Right eye exhibits no discharge. Left eye exhibits no discharge. Right conjunctiva is not injected. Left conjunctiva is not injected. Right pupil is round. Left pupil is round. Pupils are equal.   Neck: Neck supple. No JVD present. Carotid bruit is not present. No thyromegaly present.   Cardiovascular: Normal rate, regular rhythm and S2 normal.   No extrasystoles are present. PMI is not displaced.  Exam reveals no gallop.    Murmur heard.   Midsystolic murmur is present  at the upper right sternal border  Pulses:       Radial pulses are 2+ on the right side, " and 2+ on the left side.        Femoral pulses are 2+ on the right side, and 2+ on the left side.       Dorsalis pedis pulses are 1+ on the right side, and 1+ on the left side.        Posterior tibial pulses are 1+ on the right side, and 1+ on the left side.   Mechanical S1.   Pulmonary/Chest: Effort normal and breath sounds normal.   Abdominal: Soft. Normal appearance. There is no hepatosplenomegaly. There is no tenderness.   Musculoskeletal:        Right ankle: He exhibits no swelling, no ecchymosis and no deformity.        Left ankle: He exhibits no swelling, no ecchymosis and no deformity.   Lymphadenopathy:        Head (right side): No submandibular adenopathy present.        Head (left side): No submandibular adenopathy present.     He has no cervical adenopathy.   Neurological: He is alert and oriented to person, place, and time. He is not disoriented. No cranial nerve deficit or sensory deficit.   Skin: Skin is warm, dry and intact. No rash noted. He is not diaphoretic. Nails show no clubbing.   Psychiatric: He has a normal mood and affect. His speech is normal and behavior is normal. Judgment and thought content normal. Cognition and memory are impaired. He exhibits abnormal recent memory and abnormal remote memory.       Assessment:     1. Mitral valve disease    2. History of endocarditis    3. History of mitral valve replacement    4. Permanent atrial fibrillation    5. Chronic anticoagulation    6. Hypercholesterolemia    7. Type 2 diabetes mellitus without complication, without long-term current use of insulin        Plan:     1. Mitral Valve Diseas   2004: Mitral valve replacement. [employer] Cyril Mechanical Valve.   On warfarin and aspirin.    2. 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      3. Atrial Fibrillation   2004: Diagnosed with permanent atrial fibrillation.   3/15/2018: ECG: In sinus rhythm.   On warfarin. Has mechanical mitral valve.   On metoprolol  50 mg Q24.   Rate appears well controlled.    4. Chronic Anticoagulation   2004: Began warfarin. Anticoagulation managed by Dr. Gamino.    Indication: Mechanical mitral valve and atrial fibrillation   Target INR 2.5-3.0   On warfarin and aspirin 81 mg Q24.    5. Hypercholesterolemia   2004: Began statin.   10/25/2016: Chol 150. HDL 33. LDL 90. .   On rosuvastatin 5 mg Q24.     6. Diabetes Mellitus, Type 2   2008: Diagnosed. Complications: None. Medications: Diet.   Diet controlled.    7. History of Overweight   3/22/2017: Weight 84 kg. BMI 26.   6/21/2017: Weight 80 kg. BMI 25.   Now fine.    8. Dementia   2016: Diagnosed.   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   On donepezil 10 mg Q24.   3/2018: In hospice with [facility].    9. Primary Care   Dr. Jj Villafuerte.    F/u 4 months.    Sherita Gamino M.D.

## 2018-03-16 PROBLEM — N39.0 URINARY TRACT INFECTION WITHOUT HEMATURIA: Status: RESOLVED | Noted: 2018-01-08 | Resolved: 2018-01-01

## 2018-03-16 PROBLEM — N48.89 PENILE SWELLING: Status: RESOLVED | Noted: 2018-01-07 | Resolved: 2018-01-01

## 2018-03-16 NOTE — PROGRESS NOTES
Subjective:       Patient ID: Rikki Morrison is a 78 y.o. male.    Chief Complaint: Follow-up (episodes of seizure like movements )    On Home Hospice. Wife cares for him.  WC bound.      Diabetes   He presents for his follow-up diabetic visit. He has type 2 diabetes mellitus. His disease course has been stable. Hypoglycemia symptoms include confusion.     Review of Systems   Constitutional: Positive for activity change, appetite change and unexpected weight change.   Respiratory: Negative.    Cardiovascular: Negative.    Psychiatric/Behavioral: Positive for agitation, behavioral problems and confusion. Negative for dysphoric mood.       Objective:      Physical Exam   Constitutional: He appears well-developed and well-nourished.   HENT:   Head: Normocephalic and atraumatic.   Eyes: Pupils are equal, round, and reactive to light.   Cardiovascular: Normal rate.  An irregularly irregular rhythm present.   Murmur heard.   Crescendo decrescendo systolic murmur is present with a grade of 3/6   Pulses:       Dorsalis pedis pulses are 2+ on the right side, and 1+ on the left side.   Mechanical sounds   Pulmonary/Chest: Effort normal.   Musculoskeletal: He exhibits edema.        Lumbar back: Normal.        Right foot: There is decreased range of motion.        Left foot: There is swelling.        Feet:    1+ edema bilat   Feet:   Right Foot:   Protective Sensation: 3 sites tested. 3 sites sensed.   Skin Integrity: Positive for erythema.   Left Foot:   Protective Sensation: 3 sites tested. 3 sites sensed.   Skin Integrity: Positive for erythema.   Neurological: He is alert. He has normal strength.       Assessment:       1. Hypercholesterolemia    2. Permanent atrial fibrillation    3. Type 2 diabetes mellitus without complication, without long-term current use of insulin    4. Acute gout involving toe of right foot, unspecified cause    5. Benign non-nodular prostatic hyperplasia with lower urinary tract symptoms    6.  Gastroesophageal reflux disease, esophagitis presence not specified        Plan:       Per orders and D/C instructions.  Continue meds/diet for DM, A. fib, gout, BPH, GERD, and high cholest. which are stable.    Screening: The patient was screened for depression with the PHQ2 questionnaire and possible health consequences were discussed with the patient, who understands (15 minutes spent). The patient was screened for the misuse of alcohol, by asking the number of drinks per average week, and if pt has had more than 4 drinks (more than 3 for women and elderly) in 1 day within the past year. The health and legal consequences of misuse were discussed (15 minutes spent). The patient was screened for obesity (BMI>30), If the current BMI > 30, then the possible consequences of obesity, as well as the benefits of diet, exercise, and weight loss were discussed (15 minutes spent).

## 2018-03-27 NOTE — TELEPHONE ENCOUNTER
3/26/18  INR 2.6    Present warfarin dose: 7.5 mg 2/7 & 5 mg 5/7.    [facility]    Left a detailed message for patient to continue same dose of warfarin, recheck INR 4wks. Requested patient to return call.
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
## 2018-04-17 PROBLEM — R47.01 APHASIA: Status: ACTIVE | Noted: 2018-01-01

## 2018-04-17 PROBLEM — R40.2410 GLASGOW COMA SCALE TOTAL SCORE 13-15: Status: ACTIVE | Noted: 2018-01-01

## 2018-04-17 PROBLEM — R56.9 SEIZURE-LIKE ACTIVITY: Status: ACTIVE | Noted: 2018-01-01

## 2018-04-17 PROBLEM — S06.5XAA SDH (SUBDURAL HEMATOMA): Status: ACTIVE | Noted: 2018-01-01

## 2018-04-17 PROBLEM — G93.5 BRAIN COMPRESSION: Status: ACTIVE | Noted: 2018-01-01

## 2018-04-17 NOTE — NURSING
Bedside report by DEO Boudreaux. Pt supine on bed; C-collar on; requires suctioning - kit in use- sputum red; Disoriented; Laceration above R-eye and cheek; bleeding controlled. wife at bedside keeping him calm.
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
## 2018-04-17 NOTE — PROGRESS NOTES
Patient returned from 2nd floor CT scanner with an RN and nursing student. Ambu bag in bed. No acute events during transfer. RN will continue to monitor.

## 2018-04-17 NOTE — PLAN OF CARE
Problem: Occupational Therapy Goal  Goal: Occupational Therapy Goal  Pt will feed self with set-up assistance.  Pt will complete Squat-pivot t/f EOB->w/c or bedside chair with Mod A.  Pt will complete supine->sit with Mod A.  Pt will complete sit->supine with Mod A.    Outcome: Ongoing (interventions implemented as appropriate)  OT goals set.  AZEEM Vazquez  4/17/2018
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
## 2018-04-17 NOTE — SIGNIFICANT EVENT
ICU attending    Discusses advanced directives with patient wife Lesli at bedside  Patient was on home hospice for Dementia  Hospice held while in hospital  Lesli made patient DNR DNI  Once patient stable to return home will resume his hospice    Daniel Montoya MD MPH  NeuroCritical Care & Vascular Neurology

## 2018-04-17 NOTE — ASSESSMENT & PLAN NOTE
NSGY consulted  On coumadin - INR 3.9 at OSH - given vit K reportedly  Repeat CT head - pending repeat INR and CT, may need PCC - discussed not giving PCC at this time with family  Elevated HOB  PT/OT/ST  Frequent neurochecks  Stat CTH for any change in exam  Keppra
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
## 2018-04-17 NOTE — PLAN OF CARE
Problem: Patient Care Overview  Goal: Plan of Care Review  Outcome: Ongoing (interventions implemented as appropriate)  POC reviewed with pt family at approximately 0300. Pt family aware of risks and benefits. Pt verbalized understanding. Questions and concerns addressed. No acute events overnight. Pt progressing toward goals. Will continue to monitor. See flowsheets for full assessment and VS info     INR will be trended d/t PCC administration. Pt has planned diagnostic study. NSCCU called to bedside to consult family and explain care in-depth.

## 2018-04-17 NOTE — ASSESSMENT & PLAN NOTE
On coumadin with reported goal INR of 2.5-3.5  3.9 at OSH  Risk of reversal discussed with family - verbalized understanding  F/u repeat scan

## 2018-04-17 NOTE — PLAN OF CARE
Problem: Physical Therapy Goal  Goal: Physical Therapy Goal  Goals to be met by: 2018     Patient will increase functional independence with mobility by performin. Supine to sit with Minimal Assistance  2. Sit to supine with Minimal Assistance  3. Sit to stand transfer with Moderate assistance   4. Bed to chair transfer with Minimal Assistance using appropriate AD.   5. Sitting at edge of bed x10 minutes with Supervision  6. Lower extremity exercise program x20 reps per handout, with assistance as needed    Outcome: Ongoing (interventions implemented as appropriate)  Pt chart reviewed and PT evaluation completed- see note for details. POC initiated.     Batsheva Marie, PT, DPT   2018  Pager: [phone]
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
## 2018-04-17 NOTE — SUBJECTIVE & OBJECTIVE
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 BMI 26.    Stricture and stenosis of esophagus 1/22/2014    Dilation 11/01, 12/13 Dr. Caldwell     Past Surgical History:   Procedure Laterality Date    CATARACT EXTRACTION, BILATERAL      HERNIA REPAIR      KNEE ARTHROSCOPY Right 1990    MITRAL VALVE REPLACEMENT  11/04    mechanical    Trigger finger right hand  1997     Review of patient's allergies indicates:   Allergen Reactions    Pcn [penicillins] Rash       Scheduled Medications:    amLODIPine  2.5 mg Oral Daily    levetiracetam (KEPPRA) IVPB 100ml  500 mg Intravenous Q12H    metoprolol succinate  50 mg Oral Daily    sodium chloride 0.9%  3 mL Intravenous Q8H       PRN Medications: sodium chloride, dextrose 50%, glucagon (human recombinant), insulin aspart U-100, labetalol, magnesium oxide, magnesium oxide, ondansetron, potassium chloride 10%, potassium chloride 10%, potassium chloride 10%, potassium, sodium phosphates, potassium, sodium phosphates, potassium, sodium phosphates    Family History     Problem Relation (Age of Onset)    Cancer Father    Diabetes Mother    Heart disease Mother (65)        Social History Main Topics    Smoking status: Never Smoker    Smokeless tobacco: Never Used    Alcohol use No    Drug use: No    Sexual activity: Not Currently     Review of Systems   Unable to perform ROS: Dementia     Objective:     Vital Signs (Most  Recent):  Temp: 97.7 °F (36.5 °C) (04/17/18 1105)  Pulse: 66 (04/17/18 1305)  Resp: (!) 22 (04/17/18 1305)  BP: (!) 147/63 (04/17/18 1305)  SpO2: 99 % (04/17/18 1305)    Vital Signs (24h Range):  Temp:  [97.3 °F (36.3 °C)-99.2 °F (37.3 °C)] 97.7 °F (36.5 °C)  Pulse:  [] 66  Resp:  [11-25] 22  SpO2:  [92 %-100 %] 99 %  BP: (116-154)/(56-98) 147/63     Body mass index is 22.22 kg/m².    Physical Exam   Constitutional: He appears well-developed and well-nourished. No distress.   HENT:   Head: Normocephalic. Head is with right periorbital erythema (hematoma).   Right Ear: External ear normal.   Left Ear: External ear normal.   Nose: Nose normal.   Eyes: Right eye exhibits no discharge. Left eye exhibits no discharge. No scleral icterus.   Neck: Normal range of motion.   Cardiovascular: Normal rate, regular rhythm and intact distal pulses.    Pulmonary/Chest: Effort normal. No respiratory distress. He has no wheezes.   Abdominal: Soft. He exhibits no distension. There is no tenderness.   Musculoskeletal: Normal range of motion. He exhibits no edema or tenderness.   Neurological: He is alert. He is disoriented (oriented to person only).   Skin: Skin is warm and dry. No rash noted.   Psychiatric: Thought content normal. He is hyperactive (restless). Cognition and memory are impaired. He is inattentive.   Vitals reviewed.         Diagnostic Results:   Labs: Reviewed  CT: Reviewed

## 2018-04-17 NOTE — PLAN OF CARE
04/17/18 1100   Discharge Assessment   Assessment Type Discharge Planning Assessment   Confirmed/corrected address and phone number on facesheet? Yes   Assessment information obtained from? Caregiver   Expected Length of Stay (days) 5   Communicated expected length of stay with patient/caregiver yes   Prior to hospitilization cognitive status: Unable to Assess  (Per wife, patient with baseline dementia)   Prior to hospitalization functional status: Partially Dependent   Current cognitive status: Unable to Assess   Current Functional Status: Completely Dependent   Lives With spouse   Able to Return to Prior Arrangements unable to determine at this time (comments)   Is patient able to care for self after discharge? Unable to determine at this time (comments)   Who are your caregiver(s) and their phone number(s)? Lesli Morrison (wife)  [phone]   Patient's perception of discharge disposition hospice/home   Readmission Within The Last 30 Days no previous admission in last 30 days   Patient currently being followed by outpatient case management? No   Patient currently receives any other outside agency services? Yes   Name and contact number of agency or person providing outside services [facility]   Is it the patient/care giver preference to resume care with the current outside agency? Yes   Equipment Currently Used at Home wheelchair   Do you have any problems affording any of your prescribed medications? No   Is the patient taking medications as prescribed? yes   Does the patient have transportation home? Yes   Transportation Available family or friend will provide   Does the patient receive services at the [facility]? Yes   Discharge Plan A Hospice/home   Discharge Plan B Hospice/home   Patient/Family In Agreement With Plan yes         Discharge/ My Health Packet Folder Given to patient/family:      yes      PCP:  ODALYS Villafuerte MD        [address]    [address]  [address]  [address] [place] [address]  Phone: [phone] Fax: [phone]    [facility] [address] [place] [address], [place] - [address] AT [facility] [address]  [address]  [place] [address]  Phone: [phone] Fax: [phone]      Emergency Contacts:  Extended Emergency Contact Information  Primary Emergency Contact: Lesli Morrison  Address: [address] [place] [address]  Home Phone: [phone]  Mobile Phone: [phone]  Relation: Spouse  Preferred language: English   needed? No      Insurance:    Payor: [employer]ARE / Plan: [employer] [facility] CHOICES 65 / Product Type: Medicare Advantage /     Aniya Bucio RN, CCRN-K, CCM  Neuro-Critical Care   X [phone]

## 2018-04-17 NOTE — PROGRESS NOTES
Patient taken to 2nd floor CT scan with an RN and nursing student on a portable monitor. Ambu bag in bed. NO acute events during transfer. RN will continue to monitor.

## 2018-04-17 NOTE — PLAN OF CARE
Problem: Patient Care Overview  Goal: Plan of Care Review  Outcome: Ongoing (interventions implemented as appropriate)  POC reviewed with patient and family at 1300. No evidence of learning from the patient. Questions and concerns addressed with family. Patient is on nectar thicken for water and medications are to be crushed per speech therapy. CT scan planned for the afternoon. RN will continue to monitor. See flowsheets for full assessment and VS info.

## 2018-04-17 NOTE — PROGRESS NOTES
Patient arrived to room 7067 with continuous cardiac monitoring and pulse oximetry. Dr. Gonzalez at the bedside. Right eye/cheek laceration noted, bleeding controlled.

## 2018-04-17 NOTE — PLAN OF CARE
Problem: SLP Goal  Goal: SLP Goal  Pt seen for clinical swallow eval and cognitive linguistic assessment this date. Pt with baseline dementia spouse at the bedside providing pertinent background information. Pt appears safe to advance to purees and nectar thick liquids. Speech to continue to follow.     Laly Dunn MS, CCC-SLP  Speech Language Pathologist  Pager: [phone]  Date 4/17/2018

## 2018-04-17 NOTE — PROGRESS NOTES
Dr. Alexis called to bedside per patient's mother request. Patient has seizure-like activity. No EEG ordered. Patient's mother reported this to be normal activity of the patient. Mother also refused half of PCC dosage. Nurse will continue to monitor patient. No interventions at this time.

## 2018-04-17 NOTE — SUBJECTIVE & OBJECTIVE
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 BMI 26.    Stricture and stenosis of esophagus 1/22/2014    Dilation 11/01, 12/13 Dr. Caldwell     Past Surgical History:   Procedure Laterality Date    CATARACT EXTRACTION, BILATERAL      HERNIA REPAIR      KNEE ARTHROSCOPY Right 1990    MITRAL VALVE REPLACEMENT  11/04    mechanical    Trigger finger right hand  1997      No current facility-administered medications on file prior to encounter.      Current Outpatient Prescriptions on File Prior to Encounter   Medication Sig Dispense Refill    amlodipine (NORVASC) 2.5 MG tablet TAKE ONE TABLET BY MOUTH ONCE DAILY 90 tablet 3    cholecalciferol, vitamin D3, (VITAMIN D3) 1,000 unit capsule Take 1,000 Units by mouth once daily.      cyanocobalamin, vitamin B-12, 1,000 mcg Subl Place 1 tablet under the tongue once daily.      ferrous sulfate 325 (65 FE) MG EC tablet Take 325 mg by mouth once daily. at dinner      finasteride (PROSCAR) 5 mg tablet TAKE ONE TABLET BY MOUTH ONCE DAILY 90 tablet 3    fish oil-omega-3 fatty acids 300-1,000 mg capsule Take 1 g by mouth once daily.      galantamine (RAZADYNE) 8 MG Tab Take 8 mg by mouth every evening.       memantine (NAMENDA) 10 MG Tab Take 10 mg by mouth 2 (two) times daily.       metoprolol succinate (TOPROL-XL) 50 MG 24 hr tablet TAKE ONE TABLET BY MOUTH ONCE DAILY (Patient taking differently: TAKE ONE TABLET BY MOUTH ONCE DAILY  IN THE MORNING) 90 tablet 3    omeprazole (PRILOSEC) 20 MG capsule TAKE ONE CAPSULE BY MOUTH TWICE DAILY (Patient taking differently: TAKE ONE CAPSULE BY MOUTH TWICE DAILY AS NEEDED) 180 capsule 3    potassium citrate (UROCIT-K) 10 mEq (1,080 mg) TbSR Take 1 tablet (10 mEq total) by mouth 3 (three) times daily with meals. 90 tablet 1    QUEtiapine (SEROQUEL) 25 MG Tab Take 25 mg by mouth once daily. 1 hour prior to bed time      rosuvastatin (CRESTOR) 5 MG tablet Take 1 tablet (5 mg total) by mouth every evening. 90 tablet 3    UBIDECARENONE (COENZYME Q10) 200 mg Tab Take 1 capsule by mouth once daily.       vortioxetine (TRINTELLIX) 10 mg Tab Take 10 mg by mouth once daily.      warfarin (COUMADIN) 5 MG tablet TAKE ONE TABLET BY MOUTH ONCE DAILY AT BEDTIME 90 tablet 5    aspirin (ECOTRIN) 81 MG EC tablet Take 1 tablet (81 mg total) by mouth once daily. (Patient taking differently: Take 81 mg by mouth every evening. ) 90 tablet 3    nitroGLYCERIN (NITROSTAT) 0.4 MG SL tablet Place 1 tablet (0.4 mg total) under the tongue every 5 (five) minutes as needed for Chest pain. 10 tablet 1      Allergies: Pcn [penicillins]    Family History   Problem Relation Age of Onset    Heart disease Mother 65     MI    Diabetes Mother     Cancer Father      Social History   Substance Use Topics    Smoking status: Never Smoker    Smokeless tobacco: Never Used    Alcohol use No     Review of Systems   Unable to perform ROS: Dementia     Objective:     Vitals:    Temp: 97.3 °F (36.3 °C)  Pulse: 67  Rhythm: normal sinus rhythm  BP: (!) 149/98  MAP (mmHg): 118  Resp: (!) 25  SpO2: 99 %  O2 Device (Oxygen Therapy): room air    Temp  Min: 97.3 °F (36.3 °C)  Max: 99.2 °F (37.3 °C)  Pulse  Min: 64  Max: 131  BP  Min: 120/91  Max: 154/67  MAP (mmHg)  Min: 89  Max: 118  Resp  Min: 12  Max: 25  SpO2  Min: 92 %  Max: 99 %    No intake/output data recorded.           Physical Exam   Constitutional: He appears well-developed and  well-nourished.   HENT:   Periorbital edema on R with dressing in place   Eyes: EOM are normal. Pupils are equal, round, and reactive to light.   Neck: Normal range of motion.   Cardiovascular: Normal rate and regular rhythm.    Pulmonary/Chest: Effort normal and breath sounds normal.   Abdominal: Soft. He exhibits no distension. There is no tenderness.   Neurological: He is alert. GCS eye subscore is 4. GCS verbal subscore is 4. GCS motor subscore is 6.   Awake and alert  Answers yes and no - intermittently appropriately  Follows simple commands intermittently - hearing difficulty           Pulse: 67 (04/17/18 0500)  Resp: (!) 25 (04/17/18 0500)  BP: (!) 149/98 (04/17/18 0300)  SpO2: 99 % (04/17/18 0500)           Resp Rate Total:  [12 br/min] 12 br/min    No intake/output data recorded.       0  No intake/output data recorded.   No results for input(s): PH, PCO2, PO2, BE, POCLAC, LACTATE in the last 24 hours.  Recent Labs  Lab 04/17/18  0339      K 4.8      CO2 26   BUN 11   CREATININE 0.9   CALCIUM 10.8*   MG 2.4   PHOS 2.8     Recent Labs  Lab 04/17/18  0339   WBC 13.69*   HGB 13.8*      INR 1.8*   APTT 28.8     Recent Labs  Lab 04/17/18  0339   PROT 6.5   ALBUMIN 3.8   AST 21   ALT 13   ALKPHOS 93   BILITOT 1.4*     Recent Labs      04/17/18   0252   POCTGLUCOSE  146*     Continuous  sodium chloride 0.9% Last Rate: 75 mL/hr at 04/17/18 0400   Scheduled  levetiracetam (KEPPRA) IVPB 100ml 500 mg Q12H   sodium chloride 0.9% 3 mL Q8H   PRN  sodium chloride  Q24H PRN   dextrose 50% 12.5 g PRN   glucagon (human recombinant) 1 mg PRN   insulin aspart U-100 1-10 Units Q6H PRN   labetalol 10 mg Q4H PRN   magnesium oxide 800 mg PRN   magnesium oxide 800 mg PRN   ondansetron 8 mg Q8H PRN   potassium chloride 10% 40 mEq PRN   potassium chloride 10% 40 mEq PRN   potassium chloride 10% 40 mEq PRN   potassium, sodium phosphates 2 packet PRN   potassium, sodium phosphates 2 packet PRN   potassium, sodium  phosphates 2 packet PRN           Lines/Drains/Airways     Pressure Ulcer                 Pressure Ulcer 01/08/18 0900 Right elbow Unstageable 98 days          Peripheral Intravenous Line                 Peripheral IV - Single Lumen 04/16/18 1830 Right Antecubital less than 1 day

## 2018-04-17 NOTE — CONSULTS
Inpatient consult to Physical Medicine Rehab  Consult performed by: FLORINDA WILKES  Consult ordered by: NOLAN MOSQUEDA  Reason for consult: assess rehab needs      Reviewed patient history and current admission.  Rehab team following.  Full consult to follow.    SHAUNA Irwin, FNP-C  Physical Medicine & Rehabilitation   04/17/2018  Spectralink: [id number]

## 2018-04-17 NOTE — PLAN OF CARE
Called to bedside as patient's POA and wife was refusing PCC. Discussed with patient's wife and family once again the risks and benefits of PCC. Patient's wife agreed to have patient receive 25 units/kg. Patient's wife and family were in agreement with the plan as outlined above. Entire family verbalized their understanding of the risk of stroke with PCC, but also verbalized that they agreed that benefit of PCC outweighed the risk given the hematoma expansion that I showed to them on CT scan of the head. Encouraged compliance with visitation policy with family. Answered all questions. Visualized reported seizure activity. Does not appear to be seizure, but will get cap EEG to rule out. Keppra ordered for seizure prophylaxis given traumatic SDH.
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
## 2018-04-17 NOTE — ASSESSMENT & PLAN NOTE
Contributing Nutrition Diagnosis  Inadequate energy intake    Related to (etiology):   Decreased ability to consume adequate energy    Signs and Symptoms (as evidenced by):   NPO status, no form of nutrition at this time    Interventions/Recommendations (treatment strategy):  See recs above    Nutrition Diagnosis Status:   New
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
## 2018-04-17 NOTE — PT/OT/SLP EVAL
Speech Language Pathology Evaluation  Cognitive/Bedside Swallow    Patient Name:  Rikki Morrison   MRN:  [id number]  Admitting Diagnosis: <principal problem not specified> SDH; dementia at baseline     Recommendations:                  General Recommendations:  Dysphagia therapy and Cognitive-linguistic therapy  Diet recommendations:  Puree, Nectar Thick   Aspiration Precautions: 1 bite/sip at a time, Assistance with meals and Assistance with thickening liquids, Eliminate distractions, Feed only when awake/alert, HOB to 90 degrees and Meds crushed in puree , No STRAWS  General Precautions: Standard, fall    History:     Past Medical History:   Diagnosis Date    *Atrial fibrillation     Abnormal echocardiogram 11/12/2012    Atrial fibrillation 11/12/2012    Bacterial endocarditis 11/12/2012    Diabetes mellitus type II     DM (diabetes mellitus) 11/12/2012    Family history of premature CAD 11/12/2012    GERD (gastroesophageal reflux disease) 11/12/2012    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 BMI 26.    Stricture and stenosis of esophagus 1/22/2014    Dilation 11/01, 12/13 Dr. Caldwell       Past Surgical History:   Procedure Laterality Date    CATARACT EXTRACTION, BILATERAL      HERNIA REPAIR      KNEE ARTHROSCOPY Right 1990    MITRAL VALVE REPLACEMENT  11/04    mechanical    Trigger finger right hand  1997       Social History: Patient lives with spouse    Prior Intubation HX:  N/A    Modified Barium Swallow: N/A    Chest X-Rays: N/A    Prior diet: soft solids and thin liquids; spouse reports he was occasionally able to self feed easy foods like a sand which but that she would always assist him with meals and Pt requires max verbal cueing and multiple sips of water to manage medications as he will  "typically hold pills in front of his mouth and attempt to chew     Subjective     Pt asleep upon arrival; groaning and able to achieve awake state with max tactile and auditory stimuli       Pain/Comfort:  Pain Rating 1:  (Pt unable to report )  Pain Rating Post-Intervention 1:  (Pt unable to report ; NAD)    Objective:     Cognitive Status:    Arousal/Alertness Delayed response to stimuli tactile, visual and auditory, family reports change from baseline usually less "sleepy"      Receptive Language:   Comprehension:      Questions Simple yes/no did not consistently respond  Commands  One step w/ SLP model w/ 50% acc, able to follow directions within similar routines spouse and sister at bedside report consistent with baseline     Pragmatics:    flat    Expressive Language:  Verbal:    intermitent unitelligible speech  also with intermittent true words "water, yes, cold"      Motor Speech:  unable to truly assess    Voice:   strong and clear     Visual-Spatial:  Pt attending to SLP across midline for both right and left sides    Reading:   unable to truly assess     Written Expression:   unable to truly assess    Oral Musculature Evaluation  Oral Musculature: unable to assess due to poor participation/comprehension  Dentition: scattered dentition (has dentures refuses to wear )  Secretion Management: adequate  Volitional Cough: unable to elicit despite SLP model   Volitional Swallow: unable to elicit   Voice Prior to PO Intake: strong and clear     Bedside Swallow Eval:   Consistencies Assessed:  · Thin liquids 4oz via open cup and straw  · Nectar thick liquids 6oz via open cup and straw  · Puree 4oz via full teaspoon    · Solids deferred secondary to increased drowsiness    Oral Phase:   · prolonged bolus prep across consistencies "swishing" bolus intraorally   · Chewing pill upon attempt to consume meds, multiple swallows without AP transfer of medication whole  · Improved ability to consume medications when " crushed in applesauce     Pharyngeal Phase:   · loud audible swallows suggestive of swallow delaye and pharyngeal pooling with thin and nectar thick liqiuds although minimally improved with nectar thick liquids  · Audible swallows appeared to decreased with small single sips from open cup   · No overt clinical signs of aspiration no changes in vocal quality and/or coughing     Compensatory Strategies  · small bites /sips   · meds whole one at a time     Treatment: education provided to family members re: role of SLP within acute care and diet recommendations at this time.     Assessment:     Rikki Morrison is a 78 y.o. male with an SLP diagnosis of Dysphagia and baseline Cognitive-Linguistic Impairments.     Goals:    SLP Goals        Problem: SLP Goal    Goal Priority Disciplines Outcome   SLP Goal     SLP    Description:  Speech Language Pathology Goals  Goals expected to be met by 4/24    1. Pt will tolerate diet of nectar thick liquids and purees without overt s/s of aspiration   2. Pt will tolerate trials of thin liquids and soft solids during speech therapy sessions to help determine least restrictive diet   3. Pt will participate in auto speech tasks with 70% acc w/ min cues  4. Pt will orient to family members w/ min cues                     Plan:     · Patient to be seen:  3 x/week   · Plan of Care expires:  05/16/18  · Plan of Care reviewed with:      · SLP Follow-Up:  Yes       Discharge recommendations:  Discharge Facility/Level Of Care Needs: home health speech therapy   Barriers to Discharge:  Level of Skilled Assistance Needed      Time Tracking:     SLP Treatment Date:   04/17/18  Speech Start Time:  1030  Speech Stop Time:  1050     Speech Total Time (min):  20 min    Billable Minutes: Eval 10  and Eval Swallow and Oral Function 10    Laly Dunn CCC-SLP  04/17/2018

## 2018-04-17 NOTE — SUBJECTIVE & OBJECTIVE
Interval History:  4/17 pt was admitted last night 4/16 for Left Parafalcine SDH. Repeat INR post K-Centra pending, repeat CT Head pending. Restarted home BP meds    Review of Systems   Unable to obtain a complete ROS due to level of consciousness/ lethargy   Objective:     Vitals:  Temp: 98.8 °F (37.1 °C)  Pulse: 80  Rhythm: normal sinus rhythm  BP: (!) 163/69  MAP (mmHg): 99  Resp: 19  SpO2: 95 %  O2 Device (Oxygen Therapy): room air    Temp  Min: 97.3 °F (36.3 °C)  Max: 99.2 °F (37.3 °C)  Pulse  Min: 64  Max: 131  BP  Min: 116/57  Max: 163/69  MAP (mmHg)  Min: 80  Max: 118  Resp  Min: 11  Max: 26  SpO2  Min: 83 %  Max: 100 %    04/16 0701 - 04/17 0700  In: 211.3 [I.V.:211.3]  Out: 125 [Urine:125]   Unmeasured Output  Urine Occurrence: 0  Stool Occurrence: 0       Physical Exam   Eyes:   Bruising and edema to R eye   Neurological:   Pupil reactive R eye, squeezes L eye shut  4/5 in all 4 extremities  Follows commands intermittently      Unable to test orientation, language, memory, judgment, insight, fund of knowledge, gait due to level of consciousness.    Medications:  Continuous  sodium chloride 0.9% Last Rate: 75 mL/hr at 04/17/18 1705   Scheduled  amLODIPine 2.5 mg Daily   levetiracetam (KEPPRA) IVPB 100ml 500 mg Q12H   metoprolol succinate 50 mg Daily   sodium chloride 0.9% 3 mL Q8H   PRN  sodium chloride  Q24H PRN   dextrose 50% 12.5 g PRN   glucagon (human recombinant) 1 mg PRN   insulin aspart U-100 1-10 Units Q6H PRN   labetalol 10 mg Q4H PRN   magnesium oxide 800 mg PRN   magnesium oxide 800 mg PRN   ondansetron 8 mg Q8H PRN   potassium chloride 10% 40 mEq PRN   potassium chloride 10% 40 mEq PRN   potassium chloride 10% 40 mEq PRN   potassium, sodium phosphates 2 packet PRN   potassium, sodium phosphates 2 packet PRN   potassium, sodium phosphates 2 packet PRN     Today I personally reviewed pertinent medications, lines/drains/airways, lab results, notably:    Diet  Diet Dysphagia Pureed (IDDSI Level 4)  Nectar Thick  Diet Dysphagia Pureed (IDDSI Level 4) Nectar Thick
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
## 2018-04-17 NOTE — PROGRESS NOTES
Nurse enforced family rule of two visitors at a time. Nurse taught family member about the importance of the little to no stimulation. Family refused. Pt continued to have seizure-like activity.

## 2018-04-18 PROBLEM — I10 HTN (HYPERTENSION): Status: ACTIVE | Noted: 2018-01-01

## 2018-04-18 PROBLEM — S02.85XA CLOSED FRACTURE OF ORBITAL WALL: Status: ACTIVE | Noted: 2018-01-01

## 2018-04-18 PROBLEM — S06.5XAA SDH (SUBDURAL HEMATOMA): Chronic | Status: ACTIVE | Noted: 2018-01-01

## 2018-04-18 NOTE — PROGRESS NOTES
Pt combative. Criteria for restraints explained to both patient and wife. Wife verablizes understanding. NP called to bedside at approximately 2100 on 4/17/18 to assess patient and wife agreed to medication administration first and changing site of O2 probe. Patient still combative after initiated interventions.

## 2018-04-18 NOTE — HOSPITAL COURSE
4/18: patient made DNR/DNI yesterday and plans made to return home to hospice care upon stabilization

## 2018-04-18 NOTE — ASSESSMENT & PLAN NOTE
NSGY consulted  On coumadin - INR 3.9 at OSH - given vit K reportedly  Repeat CT head showed enlarged SDH, 1/2 dose Kcentra given per request of family  CT head 4/17 stable  Keppra 500mg BID for seizure prophylaxis  Continue to hold anticoagulation
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
## 2018-04-18 NOTE — NURSING
"RN noted episode of pronounced L arm shaking lasting 5 seconds. 1-2 minutes later, pt had another episode of pronounced L arm shaking lasting 10 seconds. Daughter at bedside stated, "he does this at home, but not as frequent." MEME Marshall w/ [facility] called to bedside. Pt had additional episode of pronounced L arm shaking lasting 10 seconds. No new orders at this time. Will continue to monitor very closely.   "

## 2018-04-18 NOTE — PT/OT/SLP PROGRESS
Speech Language Pathology/ Discharge Summary       Rikki Morrison  MRN: [id number]    Patient discharged from speech therapy services at this time.  (Per information gleaned from medical record Pt discharging with home hospice later this date. ).     Laly Dunn CCC-SLP

## 2018-04-18 NOTE — PROGRESS NOTES
MEME Correia called to bedside per patient's wife request. NP and nurse explained to wife the importance of not altering patient's mental status. Nurse also explained the home meds would be reviewed by [facility] team to decide on resuming the meds.

## 2018-04-18 NOTE — SUBJECTIVE & OBJECTIVE
Interval History: made DNR/DNI yesterday, will likely go home to hospice care when stable and ready for discharge. Neuro exam stable overnight    Medications:  Continuous Infusions:   sodium chloride 0.9% 75 mL/hr at 04/18/18 1310     Scheduled Meds:   amLODIPine  2.5 mg Oral Daily    galantamine  4 mg Oral BID    levETIRAcetam  500 mg Oral BID    metoprolol succinate  50 mg Oral Daily    sodium chloride 0.9%  3 mL Intravenous Q8H     PRN Meds:sodium chloride, dextrose 50%, glucagon (human recombinant), insulin aspart U-100, labetalol, magnesium oxide, magnesium oxide, ondansetron, potassium chloride 10%, potassium chloride 10%, potassium chloride 10%, potassium, sodium phosphates, potassium, sodium phosphates, potassium, sodium phosphates     Review of Systems  Objective:     Weight: 74.3 kg (163 lb 12.8 oz)  Body mass index is 22.22 kg/m².  Vital Signs (Most Recent):  Temp: 97.6 °F (36.4 °C) (04/18/18 1102)  Pulse: 73 (04/18/18 1302)  Resp: (!) 32 (04/18/18 1302)  BP: (!) 107/54 (04/18/18 1302)  SpO2: 100 % (04/18/18 0802) Vital Signs (24h Range):  Temp:  [97.5 °F (36.4 °C)-98.8 °F (37.1 °C)] 97.6 °F (36.4 °C)  Pulse:  [] 73  Resp:  [12-32] 32  SpO2:  [83 %-100 %] 100 %  BP: ()/(51-76) 107/54       Date 04/18/18 0700 - 04/19/18 0659   Shift 4060-9164 5180-4816 6273-8556 24 Hour Total   I  N  T  A  K  E   P.O. 125   125    I.V.  (mL/kg) 555.5  (7.5)   555.5  (7.5)    IV Piggyback 200   200    Shift Total  (mL/kg) 880.5  (11.9)   880.5  (11.9)   O  U  T  P  U  T   Urine  (mL/kg/hr) 275   275    Shift Total  (mL/kg) 275  (3.7)   275  (3.7)   Weight (kg) 74.3 74.3 74.3 74.3                   Male External Urinary Catheter 04/17/18 0400 (Active)   Collection Container Urimeter 4/18/2018  3:00 AM   Securement Method secured to top of thigh w/ adhesive device 4/17/2018  3:05 PM   Skin no breakdown;no redness 4/18/2018  3:00 AM   Tolerance no signs/symptoms of discomfort 4/18/2018  3:00 AM   Output (mL)  150 mL 4/18/2018  1:02 PM   Catheter Change Date 04/18/18 4/18/2018  3:00 AM   Catheter Change Time 0457 4/18/2018  3:00 AM       Neurosurgery Physical Exam  GCS E4V3M5     Pupils: Equal and Reactive.   Extraocular Movements: Conjugate, normal movement.     Motor Exam:  Left Upper Extremity: Moves spontaneously.  Right Upper Extremity: Moves spontaneously.  Left Lower Extremity: Moves spontaneously.  Right Lower Extremity: Moves spontaneously.      Significant Labs:    Recent Labs  Lab 04/16/18  1859 04/17/18  0339 04/18/18  0408   * 160* 125*    140 144   K 4.2 4.8 3.8    106 113*   CO2 25 26 24   BUN 10 11 11   CREATININE 1.0 0.9 0.8   CALCIUM 11.0* 10.8* 9.1   MG  --  2.4 2.0       Recent Labs  Lab 04/17/18  0029 04/17/18  0339 04/18/18  0408   WBC 14.65* 13.69* 8.23   HGB 14.0 13.8* 10.9*   HCT 44.3 42.8 34.6*    233 181       Recent Labs  Lab 04/17/18  0029 04/17/18  0339 04/17/18  0553 04/17/18  1027 04/18/18  0408   INR 2.5* 1.8* 1.0 1.0 1.0   APTT 30.4 28.8  --   --  24.0     Microbiology Results (last 7 days)     ** No results found for the last 168 hours. **        All pertinent labs from the last 24 hours have been reviewed.    Significant Diagnostics:  I have reviewed and interpreted all pertinent imaging results/findings within the past 24 hours.

## 2018-04-18 NOTE — PLAN OF CARE
[facility]     Department of Hospital Medicine     [address] [place] [address]     [phone] after hours  [phone] fax                                   HOSPICE  ORDERS     04/18/2018    Admit to Hospice:  Home Service   Diagnoses:  Active Hospital Problems    Diagnosis  POA    *SDH (subdural hematoma) [I62.00]  Yes     Chronic    HTN (hypertension) [I10]  Unknown    Closed fracture of orbital wall [S02.80XA]  Unknown    Brain compression [G93.5]  Yes    Jody coma scale total score 13-15 [R40.2410]  Yes    Seizure-like activity [R56.9]  Yes    Aphasia [R47.01]  Yes    H/O mitral valve replacement with mechanical valve [Z95.2]  Not Applicable      Resolved Hospital Problems    Diagnosis Date Resolved POA   No resolved problems to display.       Hospice Qualifying Diagnoses: Parkinson's dementia       Patient has a life expectancy < 6 months due to these conditions.    Vital Signs: Routine per Hospice Protocol.    Allergies:  Review of patient's allergies indicates:   Allergen Reactions    Pcn [penicillins] Rash       Diet: pureed with nectar thick liquids    Activities: As tolerated    Nursing: Per Hospice Routine    Future Orders:  Hospice Medical Director may dictate new orders for comfortable care measures & sign death certificate.    Medications:      Medication List      START taking these medications    levETIRAcetam 500 MG Tab  Commonly known as:  KEPPRA  Take 1 tablet (500 mg total) by mouth 2 (two) times daily.        CHANGE how you take these medications    aspirin 81 MG EC tablet  Commonly known as:  ECOTRIN  Take 1 tablet (81 mg total) by mouth once daily.  What changed:  when to take this     metoprolol succinate 50 MG 24 hr tablet  Commonly known as:  TOPROL-XL  TAKE ONE TABLET BY MOUTH ONCE DAILY  What changed:  See the new instructions.     omeprazole 20 MG capsule  Commonly known as:  PRILOSEC  TAKE ONE CAPSULE BY MOUTH TWICE  DAILY  What changed:  See the new instructions.        CONTINUE taking these medications    amLODIPine 2.5 MG tablet  Commonly known as:  NORVASC  TAKE ONE TABLET BY MOUTH ONCE DAILY     cyanocobalamin (vitamin B-12) 1,000 mcg Subl     finasteride 5 mg tablet  Commonly known as:  PROSCAR  TAKE ONE TABLET BY MOUTH ONCE DAILY     galantamine 8 MG 24 hr capsule  Commonly known as:  RAZADYNE ER     memantine 10 MG Tab  Commonly known as:  NAMENDA     nitroGLYCERIN 0.4 MG SL tablet  Commonly known as:  NITROSTAT  Place 1 tablet (0.4 mg total) under the tongue every 5 (five) minutes as needed for Chest pain.     potassium citrate 10 mEq (1,080 mg) Tbsr  Commonly known as:  UROCIT-K  Take 1 tablet (10 mEq total) by mouth 3 (three) times daily with meals.     QUEtiapine 25 MG Tab  Commonly known as:  SEROQUEL     rosuvastatin 5 MG tablet  Commonly known as:  CRESTOR  Take 1 tablet (5 mg total) by mouth every evening.     TRINTELLIX 10 mg Tab  Generic drug:  vortioxetine     VITAMIN D3 1,000 unit capsule  Generic drug:  cholecalciferol (vitamin D3)        STOP taking these medications    warfarin 5 MG tablet  Commonly known as:  COUMADIN           Where to Get Your Medications      These medications were sent to [facility] [address]  [place]  [address] [facility] [address]  [address] [facility] [address] [place] [address]    Phone:  [phone]   · aspirin 81 MG EC tablet  · levETIRAcetam 500 MG Tab           _________________________________  Diana Carlos MD  04/18/2018

## 2018-04-18 NOTE — PLAN OF CARE
Problem: Patient Care Overview  Goal: Plan of Care Review  Outcome: Ongoing (interventions implemented as appropriate)  POC reviewed with pt and family at 1300. Pt's family verbalized understanding. Questions and concerns addressed. No acute events today. Precedex gtt d/c this AM. Pt progressing toward goals. Will continue to monitor. See flowsheets for full assessment and VS info.

## 2018-04-18 NOTE — PROGRESS NOTES
Potassium not given per NP. Potassium resulted as 3.8. [facility]. No other interventions at this time.

## 2018-04-18 NOTE — PLAN OF CARE
Problem: Patient Care Overview  Goal: Plan of Care Review  Outcome: Ongoing (interventions implemented as appropriate)  POC reviewed with pt at 0400. Pt unable to verbalize understanding. Wife at bedside to verbalize understanding. Pt neuro exam consistent throughout night. Vitals stable throughout night. Pt placed on nasal cannula prn d/t intermittent desat. Pt provided portable O2 monitor d/t faulty probe (work order put in by DEO Dudley). Pt had BM x1. Pt has pureed/nectar thick diet. Wife notified of apriration risks associated with feeding pt bedside food. Patient cleaned and changed by RN x4. Pt Questions and concerns addressed. Labs collected and to be reviewed and replaced by RN. No acute events overnight. Pt progressing toward goals. Will continue to monitor. See flowsheets for full assessment and VS info

## 2018-04-18 NOTE — PLAN OF CARE
ANKUSH advised by MD that the Pt family wants to wait until tomorrow to DC home with Hospice. ANKUSH sent orders to [facility] and will contact them and the family tomorrow to see about what time is best to set up the transport.    Minnie Joyce, JUDY  Neurocritical Care   [facility]  [id number]
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
## 2018-04-18 NOTE — ASSESSMENT & PLAN NOTE
- On coumadin with reported goal INR of 2.5-3.5  - 3.9 at OSH  - given 1/2 dose PCC per pt request

## 2018-04-18 NOTE — PROGRESS NOTES
Pharmacist Intervention IV to PO Note    Rikki Morrison is a 78 y.o. male being treated with IV medication levetiracetam    Patient Data:    Vital Signs (Most Recent):  Temp: 97.8 °F (36.6 °C) (04/18/18 0702)  Pulse: 61 (04/18/18 0800)  Resp: 18 (04/18/18 0800)  BP: (!) (P) 100/54 (04/18/18 0802)  SpO2: (P) 100 % (04/18/18 0802)   Vital Signs (72h Range):  Temp:  [97.3 °F (36.3 °C)-99.2 °F (37.3 °C)]   Pulse:  []   Resp:  [11-30]   BP: ()/(51-98)   SpO2:  [83 %-100 %]        Dietary Orders:  Diet Orders            Diet Dysphagia Pureed (IDDSI Level 4) Nectar Thick: Dysphagia 1 (Pureed) starting at 04/18 1041            Based on the following criteria, this patient qualifies for intravenous to oral conversion:  [x] The patients gastrointestinal tract is functioning (tolerating medications via oral or enteral route for 24 hours and tolerating food or enteral feeds for 24 hours).  [x] The patient is hemodynamically stable for 24 hours (heart rate <100 beats per minute, systolic blood pressure >99 mm Hg, and respiratory rate <20 breaths per minute).    IV levetiracetam 500 mg BID will be changed to levetiracetam tablets 500 mg BID    Pharmacist's Name: Kari MaresD  Pharmacist's Extension: [phone]

## 2018-04-18 NOTE — PLAN OF CARE
ANKUSH advised by CM that the Pt is okay to go home with Hospice today per the MD team. SW contacted [facility] to report this. They already have someone coming out to meet with the family to complete the re-admit paperwork so they can take the Pt again today. They asked for Hospice orders and will give this SW the report and transport time information asap.    ANKUSH advised CM of the need for orders from the MD team.    Minnie Joyce LMSW  Neurocritical Care   [facility]  [id number]
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
## 2018-04-18 NOTE — PLAN OF CARE
SW sent updated information to [facility] via [facility] so they can follow this Pt.     Minnie Joyce, JUDY  Neurocritical Care   [facility]  [id number]
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
## 2018-04-19 NOTE — PROGRESS NOTES
NCC notified of pt extreme agitation and removing clothes and monitoring devices. MEME Correia ordered to give 1mg Versed. Order carried out. Will continue to monitor.

## 2018-04-19 NOTE — NURSING
Discharge instructions reviewed with pt wife. Wife verbalized understanding. Wife in possession of all pt belongings. IVs removed at discharged. Pt discharged off unit by ambulance. NAD

## 2018-04-19 NOTE — PLAN OF CARE
Problem: Patient Care Overview  Goal: Plan of Care Review  Outcome: Ongoing (interventions implemented as appropriate)  POC reviewed with pt and wife at 2300. Wife verbalized understanding. Questions and concerns addressed. No acute events overnight. Pt progressing toward goals. Will continue to monitor. See flowsheets for full assessment and VS info

## 2018-04-19 NOTE — PLAN OF CARE
04/19/18 0934   Final Note   Assessment Type Final Discharge Note   Discharge Disposition [facility] Follow Up  Appt(s) scheduled? No   Discharge plans and expectations educations in teach back method with documentation complete? Yes   Right Care Referral Info   Post Acute Recommendation Other   Referral Type Home Hospice   Facility Name [facility]       Follow-up Information     [facility].    Specialties:  Hospice and Palliative Medicine, Hospice Services  Why:  Hospice /  Home hospice  Contact information:  [address] [address]  [phone]             ODALYS Villafuerte MD.    Specialty:  Internal Medicine  Why:  Hospital follow up / Please coordinate with your Hospice MD  Contact information:  [address]  [place] [address]  [phone]             [facility] 7th Fl In 2 weeks.    Specialty:  Neurosurgery  Why:  Hospital follow up, please coordinate with your Hospice MD  Contact information:  [address]  [facility] [address] [phone]  Additional information:  7th Floor                 Aniya Bucio RN, CCRN-K, [facility]  Neuro-Critical Care   X [phone]

## 2018-04-19 NOTE — PLAN OF CARE
ANKUSH advised Pt family okay with Pt going home with Hospice today.     ANKUSH contacted Janice (Ph: [phone]) and was informed Gregory was getting the paperwork signed this AM then Pt can go. Orders and everything else looks good. Gregory will call this ANKUSH with information once that is completed.     ANKUSH met with Pt wife at bedside. Discussed returning home with hospice and transport options. She reported she has gotten bills for [employer] for trasnport and that her son is able to bring her dad home in his car. If [employer] would cover it, they might be interested in that because the Pt is weak. She reported she did sign the paperwork and has medical questions about how the coumidin would be handled and such. ANKUSH will have Gregory call her to address this question.     ANKUSH contacted [facility] and was told that this transport would be covered.    ANKUSH brought Pt wife [employer] billing phone number and a consent for medical records. She reported since the transport is covered, she would feel more comfortable with them taking him home. She will talk with her son to get a time because she also needs a ride home (she has too much stuff to go with the Pt in the ambulance.    ANKUSH received call from Pt RN reporting the Pt wife wants transport to be set up right away as the son needs to be back at the house for 2pm (he is doing some work over there). ANKUSH set up transport with [facility] and placed the envelope outside of Pt room. Contacted [facility] to report Pt leaving time. They are fine with this. Pt ready to go. Advised RN and wife.     Minnie Joyce LMSW  Neurocritical Care   [facility]  [phone]
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
## 2018-04-23 NOTE — TELEPHONE ENCOUNTER
Ct head scheduled 05/03/2018 and scheduled a follow up appt with Akilah Mckinney for SDH. Wife confirmed appt date and time. Also put a copy of appt card in the mail

## 2018-04-25 NOTE — PT/OT/SLP DISCHARGE
Physical Therapy Discharge Summary    Name: Rikki Morrison  MRN: [id number]   Principal Problem: SDH (subdural hematoma)     Patient Discharged from acute Physical Therapy on 18.  Please refer to prior PT noted date on 18 for functional status.     Assessment:     Patient has not met goals.    Objective:     GOALS:    Physical Therapy Goals        Problem: Physical Therapy Goal    Goal Priority Disciplines Outcome Goal Variances Interventions   Physical Therapy Goal     PT/OT, PT Ongoing (interventions implemented as appropriate)     Description:  Goals to be met by: 2018     Patient will increase functional independence with mobility by performin. Supine to sit with Minimal Assistance  2. Sit to supine with Minimal Assistance  3. Sit to stand transfer with Moderate assistance   4. Bed to chair transfer with Minimal Assistance using appropriate AD.   5. Sitting at edge of bed x10 minutes with Supervision  6. Lower extremity exercise program x20 reps per handout, with assistance as needed                      Reasons for Discontinuation of Therapy Services  Transfer to alternate level of care.      Plan:     Patient Discharged to: Home with Home Health Service.    Batsheva Marie, PT  2018
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
## 2018-05-10 NOTE — TELEPHONE ENCOUNTER
5/10/18  INR 1.9    Present warfarin dose: 5 mg 5/7 & 7.5 mg 2/7    Any dosing errors/missed doses? Warfarin was d/c'd 4/16/18-5/3/18.    Any new medications?  Keppra

## 2018-05-11 NOTE — TELEPHONE ENCOUNTER
Pt's wife was concern about her  severe headaches he has been having since his last visit Dr. Garibay she wanted to know if taking the Couamdin  Again is causing the headaches again.Advised Pt will relay the message to Dr. Phoenix Bal and someone will call her with a status update .

## 2018-05-11 NOTE — TELEPHONE ENCOUNTER
Spoke with the Pt's wife ( Lesli ) she had some concerns about her  she stated that he started having some severe headaches (screaming his head hurts bad) after his last visit she also stated that the headaches started again on yestraday so she give him some tylenol. Wanted to know if she needs to bring him back to see Dr. aGribay her concerns was about the Coumadin was that the cause of the headaches.advised Pt the message will be given to her Ma and someone will give her a  call on the matter.
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
## 2018-07-05 PROBLEM — I10 HTN (HYPERTENSION): Status: RESOLVED | Noted: 2018-01-01 | Resolved: 2018-01-01

## 2018-07-05 PROBLEM — I10 HYPERTENSION: Status: ACTIVE | Noted: 2018-01-01

## 2018-07-05 PROBLEM — Z95.2 H/O MITRAL VALVE REPLACEMENT WITH MECHANICAL VALVE: Status: RESOLVED | Noted: 2018-01-10 | Resolved: 2018-01-01

## 2018-07-05 NOTE — PROGRESS NOTES
Subjective:     Rikki Morrison is a 78 y.o. male with hypertension, hypercholesterolemia and diabetes mellitus type 2. He used to be overweight but lost weight being demented. 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 In 2004 he underwent mitral valve replacement receiving a 33 mm [employer] C[place]il valve. He was felt to have chronic atrial fibrillation but has had occasionally been seen to be in sinus rhythm at least in 2018. He was diagnosed with dementia in the spring of 2017 that has since rapidly progressed. He moved in to a nursing home in 12/2018. In late 1/2018 he went back home to be cared for by his wife. He appears to be in no distress. No bleeding. Easily falls asleep.     Atrial Fibrillation   Presents for follow-up visit. Symptoms are negative for bradycardia, chest pain, dizziness, hemodynamic instability, hypertension, hypotension, palpitations, shortness of breath, syncope, tachycardia and weakness. The symptoms have been stable. Past medical history includes atrial fibrillation and hyperlipidemia.   Hyperlipidemia   This is a chronic problem. The current episode started more than 1 year ago. The problem is controlled. Recent lipid tests were reviewed and are normal. Exacerbating diseases include diabetes. He has no history of chronic renal disease, hypothyroidism, liver disease, obesity or nephrotic syndrome. Pertinent negatives include no chest pain, focal sensory loss, focal weakness, leg pain, myalgias or shortness of breath.       Review of Systems   Constitution: Negative for chills, diaphoresis, fever and weakness.   HENT: Negative for nosebleeds.    Eyes: Negative for double vision, vision loss in left eye and vision loss in right eye.   Cardiovascular: Negative for chest pain, claudication, dyspnea on exertion, irregular heartbeat, leg swelling, near-syncope, orthopnea, palpitations, paroxysmal nocturnal dyspnea and syncope.   Respiratory: Negative for cough, hemoptysis,  shortness of breath and wheezing.    Endocrine: Negative for cold intolerance and heat intolerance.   Hematologic/Lymphatic: Negative for bleeding problem. Does not bruise/bleed easily.   Skin: Negative for color change and rash.   Musculoskeletal: Negative for back pain, falls and myalgias.   Gastrointestinal: Negative for heartburn, hematemesis, hematochezia, hemorrhoids, jaundice, melena, nausea and vomiting.   Genitourinary: Negative for hematuria.   Neurological: Negative for dizziness, focal weakness, headaches, light-headedness, loss of balance, numbness and vertigo.   Psychiatric/Behavioral: Positive for memory loss. Negative for altered mental status and depression. The patient is nervous/anxious.    Allergic/Immunologic: Negative for hives and persistent infections.       Current Outpatient Prescriptions on File Prior to Visit   Medication Sig Dispense Refill    acetaminophen (TYLENOL) 650 MG Supp UNWRAP AND INSERT 1 SUPP. RECTALLY EVERY 4-6 HOURS AS NEEDED FOR temperatures greater than 100  0    ANTIFUNGAL CREAM 2 % cream APPLY thin layer over affected area twice a day until resolved  0    BISAC-EVAC 10 mg Supp UNWRAP AND INSERT 1 SUPP. RECTALLY daily as needed FOR CONSTIPATION  0    cholecalciferol, vitamin D3, (VITAMIN D3) 1,000 unit capsule Take 1,000 Units by mouth once daily.      cyanocobalamin, vitamin B-12, 1,000 mcg Subl Place 1 tablet under the tongue once daily.      finasteride (PROSCAR) 5 mg tablet TAKE ONE TABLET BY MOUTH ONCE DAILY 90 tablet 3    galantamine (RAZADYNE ER) 8 MG 24 hr capsule Take 8 mg by mouth every evening.       galantamine (RAZADYNE) 8 MG Tab       hyoscyamine (LEVSIN/SL) 0.125 mg Subl GIVE 1 TABLET BY MOUTH EVERY 4-6 HOURS AS NEEDED FOR EXCESSIVE SECRETIONS  0    levETIRAcetam (KEPPRA) 500 MG Tab Take 1 tablet (500 mg total) by mouth 2 (two) times daily. 60 tablet 11    memantine (NAMENDA) 10 MG Tab Take 10 mg by mouth 2 (two) times daily.       morphine 100  mg/5 mL (20 mg/mL) concentrated solution TAKE 0.5ml BY MOUTH EVERY 2 HOURS AS NEEDED FOR PAIN / SHORTNESS OF BREATH  0    nystatin (MYCOSTATIN) cream APPLY TO THE AFFECTED AREA twice a day as needed  0    omeprazole (PRILOSEC) 20 MG capsule TAKE ONE CAPSULE BY MOUTH TWICE DAILY (Patient taking differently: TAKE ONE CAPSULE BY MOUTH TWICE DAILY AS NEEDED) 180 capsule 3    potassium citrate (UROCIT-K) 10 mEq (1,080 mg) TbSR Take 1 tablet (10 mEq total) by mouth 3 (three) times daily with meals. 90 tablet 1    promethazine (PHENERGAN) 25 MG tablet GIVE 1 TABLET BY MOUTH every 4 hours AS NEEDED FOR NAUSEA AND VOMITING  0    QUEtiapine (SEROQUEL) 25 MG Tab Take 25 mg by mouth once daily. 1 hour prior to bed time      SENNA PLUS 8.6-50 mg per tablet TAKE 1 TO 2 TABLETS BY MOUTH DAILY to prevent constipation  0    vortioxetine (TRINTELLIX) 10 mg Tab Take 10 mg by mouth once daily.      [DISCONTINUED] amlodipine (NORVASC) 2.5 MG tablet TAKE ONE TABLET BY MOUTH ONCE DAILY 90 tablet 3    [DISCONTINUED] aspirin (ECOTRIN) 81 MG EC tablet Take 1 tablet (81 mg total) by mouth once daily. 90 tablet 3    [DISCONTINUED] metoprolol succinate (TOPROL-XL) 50 MG 24 hr tablet TAKE ONE TABLET BY MOUTH ONCE DAILY (Patient taking differently: TAKE ONE TABLET BY MOUTH ONCE DAILY IN THE MORNING) 90 tablet 3    [DISCONTINUED] rosuvastatin (CRESTOR) 5 MG tablet Take 1 tablet (5 mg total) by mouth every evening. 90 tablet 3    nitroGLYCERIN (NITROSTAT) 0.4 MG SL tablet Place 1 tablet (0.4 mg total) under the tongue every 5 (five) minutes as needed for Chest pain. 10 tablet 1     No current facility-administered medications on file prior to visit.        /76   Pulse 76   Ht 6' (1.829 m)   Wt 70.3 kg (155 lb)   BMI 21.02 kg/m²       Objective:     Physical Exam   Constitutional: He is oriented to person, place, and time. He appears well-developed and well-nourished. He does not appear ill. No distress.   HENT:   Head:  Normocephalic and atraumatic.   Nose: Nose normal.   Eyes: Right eye exhibits no discharge. Left eye exhibits no discharge. Right conjunctiva is not injected. Left conjunctiva is not injected. Right pupil is round. Left pupil is round. Pupils are equal.   Neck: Neck supple. No JVD present. Carotid bruit is not present. No thyromegaly present.   Cardiovascular: Normal rate, regular rhythm and S2 normal.   No extrasystoles are present. PMI is not displaced.  Exam reveals no gallop.    Murmur heard.   Midsystolic murmur is present  at the upper right sternal border  Pulses:       Radial pulses are 2+ on the right side, and 2+ on the left side.        Femoral pulses are 2+ on the right side, and 2+ on the left side.       Dorsalis pedis pulses are 1+ on the right side, and 1+ on the left side.        Posterior tibial pulses are 1+ on the right side, and 1+ on the left side.   Mechanical S1.   Pulmonary/Chest: Effort normal and breath sounds normal.   Abdominal: Soft. Normal appearance. There is no hepatosplenomegaly. There is no tenderness.   Musculoskeletal:        Right ankle: He exhibits no swelling, no ecchymosis and no deformity.        Left ankle: He exhibits no swelling, no ecchymosis and no deformity.   Lymphadenopathy:        Head (right side): No submandibular adenopathy present.        Head (left side): No submandibular adenopathy present.     He has no cervical adenopathy.   Neurological: He is alert and oriented to person, place, and time. He is not disoriented. No cranial nerve deficit or sensory deficit.   Skin: Skin is warm, dry and intact. No rash noted. He is not diaphoretic. Nails show no clubbing.   Psychiatric: His affect is blunt. Cognition and memory are impaired. He is noncommunicative. He exhibits abnormal recent memory and abnormal remote memory.       Assessment:     1. Mitral valve disease    2. History of endocarditis    3. History of mitral valve replacement    4. Permanent atrial  fibrillation    5. Chronic anticoagulation    6. Essential hypertension    7. Hypercholesterolemia    8. Type 2 diabetes mellitus without complication, without long-term current use of insulin    9. Dementia due to Parkinson's disease without behavioral disturbance        Plan:     1. Mitral Valve Disease   2004: Mitral valve replacement. [employer] Mechanical Valve.   On warfarin and aspirin.    2. 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      3. Atrial Fibrillation   2004: Diagnosed with permanent atrial fibrillation.   3/15/2018: ECG: In sinus rhythm.   On warfarin. Has mechanical mitral valve.   On metoprolol 50 mg Q24.   Rate appears well controlled.    4. Chronic Anticoagulation   2004: Began warfarin. Anticoagulation managed by Dr. Gamino.    Indication: Mechanical mitral valve and atrial fibrillation   Target INR 2.5-3.0.   On warfarin and aspirin 81 mg Q24.    5. Hypertension   7/2017: Diagnosed.   On metoprolol 50 mg Q24 and amlodipine 2.5 mg Q24.    6. Hypercholesterolemia   2004: Began statin.   10/25/2016: Chol 150. HDL 33. LDL 90. .   On rosuvastatin 5 mg Q24.     7. Diabetes Mellitus, Type 2   2008: Diagnosed. Complications: None. Medications: Diet.   Diet controlled.    8. History of Overweight   3/22/2017: Weight 84 kg. BMI 26.   6/21/2017: Weight 80 kg. BMI 25.   Now fine.    9. Dementia   2016: Diagnosed.   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   On donepezil 10 mg Q24.   3/2018: In hospice with [facility].   4/2018: Got back to his house being cared for by his wife.    10. Primary Care   Dr. Jj Villafuerte.    F/u 4 months.    Sherita Gamino M.D.

## 2018-07-24 NOTE — PT/OT/SLP DISCHARGE
Occupational Therapy Discharge Summary    Rikki Morrison  MRN: [id number]   Principal Problem: SDH (subdural hematoma)      Patient Discharged from acute Occupational Therapy on 4/19/18.  Please refer to prior OT note dated 4/17/18 for functional status.    Assessment:      Patient appropriate for care in another setting.    Objective:     GOALS:    Occupational Therapy Goals        Problem: Occupational Therapy Goal    Goal Priority Disciplines Outcome Interventions   Occupational Therapy Goal     OT, PT/OT Ongoing (interventions implemented as appropriate)    Description:  Pt will feed self with set-up assistance.  Pt will complete Squat-pivot t/f EOB->w/c or bedside chair with Mod A.  Pt will complete supine->sit with Mod A.  Pt will complete sit->supine with Mod A.                      Reasons for Discontinuation of Therapy Services  Transfer to alternate level of care.      Plan:     Patient Discharged to: home with hospice    AZEEM Vazquez  7/24/2018

## 2018-10-15 NOTE — TELEPHONE ENCOUNTER
10/14/18  INR  6.2    Present warfarin dose:  5 mg 7/7 -- Patient held dose 10/14/18      Any new medications? Finished antibiotics 10/10/18.

## 2018-10-25 NOTE — TELEPHONE ENCOUNTER
10/25//18  INR  1.4    Present warfarin dose: 5 mg 4/7 & 2.5 mg 3/7.    Results reported by spouse.

## 2018-11-09 NOTE — TELEPHONE ENCOUNTER
Left a detailed message for patient to increase warfarin 7.5 mg 2/7 & 5 mg 5/7, recheck INR 2 wks.

## 2018-12-27 NOTE — TELEPHONE ENCOUNTER
12/26/18   INR  2.7    Present warfarin dose: 7.5 mg 1/7 & 5 mg  6/7.    [facility].    Spouse notified for patient to continue same dose of warfarin, recheck INR 4 wks.

## 2019-01-01 ENCOUNTER — TELEPHONE (OUTPATIENT)
Dept: CARDIOLOGY | Facility: CLINIC | Age: 79
End: 2019-01-01

## 2019-01-01 ENCOUNTER — HOSPITAL ENCOUNTER (EMERGENCY)
Facility: OTHER | Age: 79
Discharge: HOME | End: 2019-02-06
Attending: EMERGENCY MEDICINE
Payer: MEDICARE

## 2019-01-01 ENCOUNTER — TELEPHONE (OUTPATIENT)
Dept: INTERNAL MEDICINE | Facility: CLINIC | Age: 79
End: 2019-01-01

## 2019-01-01 VITALS
HEART RATE: 58 BPM | BODY MASS INDEX: 21.67 KG/M2 | DIASTOLIC BLOOD PRESSURE: 61 MMHG | TEMPERATURE: 98 F | RESPIRATION RATE: 16 BRPM | WEIGHT: 160 LBS | OXYGEN SATURATION: 96 % | HEIGHT: 72 IN | SYSTOLIC BLOOD PRESSURE: 113 MMHG

## 2019-01-01 DIAGNOSIS — Z87.440 HISTORY OF CYSTITIS: ICD-10-CM

## 2019-01-01 DIAGNOSIS — Z79.01 CHRONIC ANTICOAGULATION: ICD-10-CM

## 2019-01-01 DIAGNOSIS — E78.00 HYPERCHOLESTEROLEMIA: Primary | ICD-10-CM

## 2019-01-01 DIAGNOSIS — I10 ESSENTIAL HYPERTENSION: ICD-10-CM

## 2019-01-01 DIAGNOSIS — R31.9 HEMATURIA, UNSPECIFIED TYPE: Primary | ICD-10-CM

## 2019-01-01 DIAGNOSIS — I48.21 PERMANENT ATRIAL FIBRILLATION: Primary | ICD-10-CM

## 2019-01-01 LAB
ALBUMIN SERPL BCP-MCNC: 3.4 G/DL
ALP SERPL-CCNC: 73 U/L
ALT SERPL W/O P-5'-P-CCNC: 10 U/L
ANION GAP SERPL CALC-SCNC: 10 MMOL/L
AST SERPL-CCNC: 14 U/L
BACTERIA #/AREA URNS HPF: ABNORMAL /HPF
BASOPHILS # BLD AUTO: 0.03 K/UL
BASOPHILS NFR BLD: 0.4 %
BILIRUB SERPL-MCNC: 0.8 MG/DL
BILIRUB UR QL STRIP: NEGATIVE
BUN SERPL-MCNC: 12 MG/DL
CALCIUM SERPL-MCNC: 10.5 MG/DL
CHLORIDE SERPL-SCNC: 108 MMOL/L
CLARITY UR: CLEAR
CO2 SERPL-SCNC: 23 MMOL/L
COLOR UR: YELLOW
CREAT SERPL-MCNC: 0.9 MG/DL
DIFFERENTIAL METHOD: NORMAL
EOSINOPHIL # BLD AUTO: 0.5 K/UL
EOSINOPHIL NFR BLD: 7 %
ERYTHROCYTE [DISTWIDTH] IN BLOOD BY AUTOMATED COUNT: 13.8 %
EST. GFR  (AFRICAN AMERICAN): >60 ML/MIN/1.73 M^2
EST. GFR  (NON AFRICAN AMERICAN): >60 ML/MIN/1.73 M^2
GLUCOSE SERPL-MCNC: 130 MG/DL
GLUCOSE UR QL STRIP: NEGATIVE
HCT VFR BLD AUTO: 43.3 %
HGB BLD-MCNC: 14.4 G/DL
HGB UR QL STRIP: ABNORMAL
KETONES UR QL STRIP: NEGATIVE
LEUKOCYTE ESTERASE UR QL STRIP: NEGATIVE
LYMPHOCYTES # BLD AUTO: 2.2 K/UL
LYMPHOCYTES NFR BLD: 30.8 %
MCH RBC QN AUTO: 30.6 PG
MCHC RBC AUTO-ENTMCNC: 33.3 G/DL
MCV RBC AUTO: 92 FL
MICROSCOPIC COMMENT: ABNORMAL
MONOCYTES # BLD AUTO: 0.7 K/UL
MONOCYTES NFR BLD: 9.8 %
NEUTROPHILS # BLD AUTO: 3.7 K/UL
NEUTROPHILS NFR BLD: 51.9 %
NITRITE UR QL STRIP: NEGATIVE
PH UR STRIP: 7 [PH] (ref 5–8)
PLATELET # BLD AUTO: 219 K/UL
PMV BLD AUTO: 10.8 FL
POTASSIUM SERPL-SCNC: 4.3 MMOL/L
PROT SERPL-MCNC: 6.2 G/DL
PROT UR QL STRIP: NEGATIVE
RBC # BLD AUTO: 4.71 M/UL
RBC #/AREA URNS HPF: 25 /HPF (ref 0–4)
SODIUM SERPL-SCNC: 141 MMOL/L
SP GR UR STRIP: 1.02 (ref 1–1.03)
URN SPEC COLLECT METH UR: ABNORMAL
UROBILINOGEN UR STRIP-ACNC: ABNORMAL EU/DL
WBC # BLD AUTO: 7.15 K/UL

## 2019-01-01 PROCEDURE — 80053 COMPREHEN METABOLIC PANEL: CPT

## 2019-01-01 PROCEDURE — 25000003 PHARM REV CODE 250: Performed by: EMERGENCY MEDICINE

## 2019-01-01 PROCEDURE — 99284 EMERGENCY DEPT VISIT MOD MDM: CPT | Mod: 25

## 2019-01-01 PROCEDURE — 85025 COMPLETE CBC W/AUTO DIFF WBC: CPT

## 2019-01-01 PROCEDURE — P9612 CATHETERIZE FOR URINE SPEC: HCPCS

## 2019-01-01 PROCEDURE — 81000 URINALYSIS NONAUTO W/SCOPE: CPT

## 2019-01-01 PROCEDURE — 96360 HYDRATION IV INFUSION INIT: CPT

## 2019-01-01 RX ORDER — ASCORBIC ACID 500 MG
500 TABLET ORAL DAILY
COMMUNITY

## 2019-01-01 RX ORDER — SODIUM CHLORIDE 9 MG/ML
500 INJECTION, SOLUTION INTRAVENOUS
Status: COMPLETED | OUTPATIENT
Start: 2019-01-01 | End: 2019-01-01

## 2019-01-01 RX ORDER — LORAZEPAM 2 MG/ML
CONCENTRATE ORAL
COMMUNITY

## 2019-01-01 RX ORDER — WARFARIN 2.5 MG/1
2.5 TABLET ORAL
Qty: 36 TABLET | Refills: 3 | Status: SHIPPED | OUTPATIENT
Start: 2019-01-01 | End: 2020-02-22

## 2019-01-01 RX ORDER — AMOXICILLIN 500 MG
CAPSULE ORAL DAILY
COMMUNITY

## 2019-01-01 RX ORDER — UBIDECARENONE 30 MG
30 CAPSULE ORAL 3 TIMES DAILY
COMMUNITY

## 2019-01-01 RX ADMIN — SODIUM CHLORIDE 500 ML: 0.9 INJECTION, SOLUTION INTRAVENOUS at 09:02

## 2019-01-14 NOTE — TELEPHONE ENCOUNTER
----- Message from Tyler Martinez sent at 1/14/2019  2:25 PM CST -----  Wants an order for labs for his next visit.  I rescheduled him for March.  [phone] cell  Call to let her know

## 2019-02-07 NOTE — ED TRIAGE NOTES
"Pt arrived via EMS for blood in urine onset 2/1/19. Pts MD put on cipro for UTI on Monday No improvement in symptoms, pts PCP wdvised pt come in for IV antibiotics. Pt on hospice, had to be unsigned from hospiace to come to hospital. . Decreased mental status is pts baseline per wife at bedside. Pt is non verbal, wife states occasionally pt states "yes" or "no" but states not reliable. Wife states pt was desating earlier today in the 80s and was placed on oxygen. Pt on RA at this time and sats 93-95%. Pt awake and alert, pt does not follow commands, pt resisting placing pulse ox and BP cuff. Wife and family at bedside. Pts son at bedside, states pt is a DNR. Pt in NAD. Will continue to monitor   "

## 2019-02-07 NOTE — DISCHARGE INSTRUCTIONS
Return immediately to the ED if he passes out, has uncontrolled vomiting, or develops chest pain or shortness of breath

## 2019-02-07 NOTE — ED PROVIDER NOTES
Encounter Date: 2/6/2019    SCRIBE #1 NOTE: I, Catalina Grajeda, am scribing for, and in the presence of, Dr. Orlando.       History     Chief Complaint   Patient presents with    Hematuria     Pt came to the ED sylvia kern hematuria pt also taking Cipro for an infection. Pt has a hx of dementia and is non verbal.      Time seen by provider: 7:14 PM    This is a 78 y.o. male with hx of HTN and DM who presents with complaint of intermittent hematuria for six days. Per spouse, he had dark red blood without clots five days ago, four days ago, and last night. His PCP started him on Cipro for UTI two days ago and he has taken 4 doses. He is allergic to penicillin. Spouse reports hx of staph infection and his doctor is concerned for sepsis. His home health nurse placed him on 3 L oxygen today when his O2 sat decreased to 86%. He does not normally use home oxygen, as his O2 sat is above 95% at baseline. EMS reports O2 sat 96% on home oxygen, heart rate 106, and  on EMS arrival. Spouse reports hx of dementia and that he is only able to say yes or no, is bed bound, and is incontinent at baseline. He is no longer on hospice, though he is DNR. Dr. Gamino is his cardiologist.      The history is provided by the patient, a relative and the EMS personnel. The history is limited by the condition of the patient.     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    Kidney stones 11/12/2012    Overweight 3/22/2017    3/22/2017: Weight 84 kg. BMI 26.    Stricture and stenosis of esophagus 1/22/2014    Dilation 11/01, 12/13 Dr. Caldwell     Past Surgical History:   Procedure Laterality Date    CATARACT EXTRACTION, BILATERAL      HERNIA REPAIR      KNEE ARTHROSCOPY Right 1990    MITRAL VALVE REPLACEMENT  11/04    mechanical    Trigger finger right hand  1997     Family History   Problem Relation Age of Onset    Heart disease Mother 65        MI    Diabetes Mother     Cancer Father      Social History     Tobacco Use    Smoking status: Never Smoker    Smokeless tobacco: Never Used   Substance Use Topics    Alcohol use: No    Drug use: No     Review of Systems   Unable to perform ROS: Dementia       Physical Exam     Initial Vitals   BP Pulse Resp Temp SpO2   02/06/19 1905 02/06/19 1905 02/06/19 1905 02/06/19 2201 02/06/19 1905   108/72 102 16 98 °F (36.7 °C) 97 %      MAP       --                Physical Exam    Nursing note and vitals reviewed.  Constitutional: He appears well-developed and well-nourished. He is not diaphoretic. No distress.   HENT:   Head: Normocephalic and atraumatic.   Nose: Nose normal.   Mouth/Throat: Oropharynx is clear and moist.   Eyes: EOM are normal. Pupils are equal, round, and reactive to light.   Tracks with eyes.   Neck: Normal range of motion. Neck supple.   Cardiovascular: Normal rate, regular rhythm and normal heart sounds. Exam reveals no gallop and no friction rub.    No murmur heard.  Pulmonary/Chest: Breath sounds normal. No respiratory distress. He has no wheezes. He has no rhonchi. He has no rales.   Abdominal: Soft. There is no tenderness.   Neurological: GCS eye subscore is 4. GCS verbal subscore is 5. GCS motor subscore is 6.   Gross resting tremor to bilateral arms.   Skin: Skin is warm and dry.         ED Course   Procedures  Labs Reviewed   URINALYSIS, REFLEX TO URINE  CULTURE - Abnormal; Notable for the following components:       Result Value    Occult Blood UA 3+ (*)     Urobilinogen, UA 2.0-3.0 (*)     All other components within normal limits    Narrative:     Preferred Collection Type->Urine, Clean Catch   COMPREHENSIVE METABOLIC PANEL - Abnormal; Notable for the following components:    Glucose 130 (*)     Albumin 3.4 (*)     All other components within normal limits   URINALYSIS MICROSCOPIC - Abnormal; Notable for the following components:    RBC, UA 25 (*)     All other components within normal limits    Narrative:     Preferred Collection Type->Urine, Clean Catch   CBC W/ AUTO DIFFERENTIAL          Imaging Results    None          Medical Decision Making:   Clinical Tests:   Lab Tests: Ordered and Reviewed  ED Management:  Emergent evaluation of 78 y.o. white male with PMHx of HTN and DM with complaint of hematuria and recently diagnosed UTI. Physical exam remarkable for nontoxic, afebrile man without areas of focal tenderness. Labs remarkable for no leukocytosis, no anemia, occult blood in UA that is nitrite and leukocyte negative. Patient is hemodynamically stable, is able to tolerate PO, and will be discharged to wife's care to continue Cipro and follow up with PCP.            Scribe Attestation:   Scribe #1: I performed the above scribed service and the documentation accurately describes the services I performed. I attest to the accuracy of the note.    Attending Attestation:           Physician Attestation for Scribe:  Physician Attestation Statement for Scribe #1: I, Dr. Orlando, reviewed documentation, as scribed by Catalina Grajeda in my presence, and it is both accurate and complete.                    Clinical Impression:     1. Hematuria, unspecified type    2. History of cystitis                                 Cami Orlando MD  02/06/19 8011

## 2019-02-14 NOTE — TELEPHONE ENCOUNTER
Resting Heart Rate high 20's to mid 40's.  At first could not hear BP. Then got 120/55.      O2 sat 88% on 2 liters of oxygen  after breathing treatment and oxygen increased to 3 liters-- O2 sat  94%.    Lungs congested gets breathing treatments every 4 hours HR 60 after breathing treatment.    On 2/6 went to ER for hematuria, no infection but Cipro  X 7 days prescribed.  Hematuria had stopped but returned last night.    Hospice M.D. Recommending stop Aspirin and Warfarin.    Hospice nurse suggesting stop Metoprolol and Amlodipine.    Please advise.

## 2019-02-14 NOTE — TELEPHONE ENCOUNTER
----- Message from Tyler Martinez sent at 2/14/2019 10:31 AM CST -----  Hospice wants to take him off metoprolol and amlodopine do to heart rate flexing from 28 to 60 but the 60 was after breathing treatment nurse is Lemon [phone]

## 2019-02-15 NOTE — TELEPHONE ENCOUNTER
Patient saw Dr. Davis today.  U/A done in office blood and bacteria detected.  Dr. Davis prescribed Bactrim.      No visible blood since Wednesday.    O2 sat 98%    HR  121-136.      Should patient resume Warfarin and Metoprolol?     Please advise.

## 2019-02-22 NOTE — TELEPHONE ENCOUNTER
Spouse advised for patient to hold warfarin X 2 days then 5 mg 4/7 & 2.5 mg 3/7,  Recheck INR 2/27.

## 2019-02-25 NOTE — TELEPHONE ENCOUNTER
DEO Rivera w/ [facility] called.    Mr. Morrison has aspirated and the N.P.  has prescribed Levaquin once daily X 7days.      How often do you want to check a PT/INR?    Please advise.

## 2019-02-28 NOTE — TELEPHONE ENCOUNTER
Spouse notified and verbalized understanding to hold warfarin 2 days then 2.5 mg 7/7, recheck INR 1 wk.

## 2019-02-28 NOTE — TELEPHONE ENCOUNTER
2/27/19  INR  4.2    Present warfarin dose: 5 mg 4/7 & 2.5 mg 3/7.     On Levaquin has 4 days left.     [facility]

## 2021-06-22 NOTE — DISCHARGE SUMMARY
"[facility] Hospital Medicine  Discharge Summary      Patient Name: Rikki Morrison  MRN: [id number]  Admission Date: 1/7/2018  Hospital Length of Stay: 0 days  Discharge Date and Time:  01/10/2018 3:01 PM  Attending Physician: Melony Perez MD   Discharging Provider: Melony Perez MD  Primary Care Provider: ODALYS Villafuerte MD      HPI:   Patient is a 77 y.o. male who presents to the ED via EMS with complaint of penile swelling. Wife reports onset of fever yesterday. Per wife, the patient was given subcutaneous fluids through his abdomen last night by the nursing staff at [facility] for dehydration, which they believed to be the cause of the fever. She reports that he was given subcutaneous fluids today as well. Nursing staff noticed penile swelling when changing the patient's diaper this afternoon. Wife states "they said it was the fluid that went the wrong way." She notes that his diaper was wet when it was changed at 4 PM today, but not at 7 PM. Patient is unable to communicate secondary to baseline dementia, and wife states "he can't tell you anything... he never complains and he didn't make any faces as if he were in pain today."  Wife denies any fever today.       * No surgery found *      Hospital Course:   No notes on file     Consults:   Consults         Status Ordering Provider     Inpatient consult to Social Work/Case Management  Once     Provider:  (Not yet assigned)    Completed MELONY PEREZ     Inpatient consult to Urology  Once     Provider:  (Not yet assigned)    Completed RUTH BABCOCK          * Penile swelling    Patient received 1.8L subq infusion of fluid for dehydration. Penile swelling was noticed the next day.  - Urology saw patient  - Appears resolved on exam.             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  - 2004: Mitral valve replacement. [employer] Mechanical Valve.  - 2004: Began " Let us know if the energy doesn't come back!   warfarin. Anticoagulation managed by Dr. Gamino.               Indication: Mechanical mitral valve and atrial fibrillation              Target INR 2.5-3.0          Urinary tract infection without hematuria    - UA appears consistent with infection  - Urine culture no significant growth.   - Empiric Rocephin day 3  - UA shows some improvement.  - Given takes coumadin and potential for drug interaction, will consider another 3 days Cipro empirically as outpatient.             Dementia due to Parkinson's disease without behavioral disturbance    Stable  Continue galantamine, namenda          Chronic anticoagulation    - Continue Coumadin  - INR 1.9 > 2.1 here in hospital on coumadin 5 mg daily.    - Monitor INR as coumadin dose may need adjusting as noted.            Hypercholesterolemia    - Continue Crestor    Results for TO CHAMBERS (MRN [id number]) as of 1/9/2018 16:47   Ref. Range 1/8/2018 04:32   Cholesterol Latest Ref Range: 120 - 199 mg/dL 97 (L)   HDL Latest Ref Range: 40 - 75 mg/dL 19 (L)   LDL Cholesterol Latest Ref Range: 63.0 - 159.0 mg/dL 54.0 (L)   Total Cholesterol/HDL Ratio Latest Ref Range: 2.0 - 5.0  5.1 (H)   Triglycerides Latest Ref Range: 30 - 150 mg/dL 120           Type 2 diabetes mellitus without complication, without long-term current use of insulin    Diet controlled  A1c = 5.8          Permanent atrial fibrillation    Chronic afib  Continue ASA, warfarin  Per cardiology note 12/8/17:  2004: Began warfarin. Anticoagulation managed by Dr. Gamino.               Indication: Mechanical mitral valve and atrial fibrillation              Target INR 2.5-3.0              On warfarin and aspirin 81 mg Q24.          Been stable.  Wife ready for discharge back to facility.       Final Active Diagnoses:    Diagnosis Date Noted POA    PRINCIPAL PROBLEM:  Penile swelling [N48.89] 01/07/2018 Yes    H/O mitral valve replacement with mechanical valve [Z95.2] 01/10/2018 Not Applicable    Urinary  tract infection without hematuria [N39.0] 01/08/2018 Yes    Dementia due to Parkinson's disease without behavioral disturbance [G20, F02.80] 09/12/2017 Yes    Chronic anticoagulation [Z79.01] 12/19/2016 Not Applicable    Hypercholesterolemia [E78.00] 06/25/2013 Yes    Permanent atrial fibrillation [I48.2] 11/12/2012 Yes    Type 2 diabetes mellitus without complication, without long-term current use of insulin [E11.9] 11/12/2012 Yes      Problems Resolved During this Admission:    Diagnosis Date Noted Date Resolved POA       Discharged Condition: fair    Disposition: Skilled Nursing Facility    Follow Up:  Follow-up Information     ODALYS Villafuerte MD In 2 weeks.    Specialty:  Internal Medicine  Contact information:  [address]  [address]  [place] [place] [facility] [address]  [phone]             Sherita Gamino MD In 1 week.    Specialty:  Cardiology  Contact information:  [address] [address]  [phone]                 Patient Instructions:     Activity as tolerated     Notify your health care provider if you experience any of the following:  increased confusion or weakness     Notify your health care provider if you experience any of the following:  persistent dizziness, light-headedness, or visual disturbances     Notify your health care provider if you experience any of the following:  worsening rash     Notify your health care provider if you experience any of the following:  severe persistent headache     Notify your health care provider if you experience any of the following:  difficulty breathing or increased cough     Notify your health care provider if you experience any of the following:  severe uncontrolled pain     Notify your health care provider if you experience any of the following:  persistent nausea and vomiting or diarrhea     Notify your health care provider if you experience any of the following:  temperature >100.4         Significant Diagnostic Studies:   Imaging Results     None         Pending Diagnostic Studies:     None         Medications:  Reconciled Home Medications:   Current Discharge Medication List      START taking these medications    Details   ciprofloxacin HCl (CIPRO) 500 MG tablet Take 1 tablet (500 mg total) by mouth every 12 (twelve) hours.    Associated Diagnoses: Acute cystitis without hematuria         CONTINUE these medications which have NOT CHANGED    Details   amlodipine (NORVASC) 2.5 MG tablet TAKE ONE TABLET BY MOUTH ONCE DAILY  Qty: 90 tablet, Refills: 3      aspirin (ECOTRIN) 81 MG EC tablet Take 1 tablet (81 mg total) by mouth once daily.  Qty: 90 tablet, Refills: 3    Associated Diagnoses: Permanent atrial fibrillation      cyanocobalamin, vitamin B-12, 1,000 mcg Subl Place 1 tablet under the tongue once daily.      ferrous sulfate 325 (65 FE) MG EC tablet Take 325 mg by mouth once daily. at dinner      finasteride (PROSCAR) 5 mg tablet TAKE ONE TABLET BY MOUTH ONCE DAILY  Qty: 90 tablet, Refills: 3    Associated Diagnoses: Benign non-nodular prostatic hyperplasia with lower urinary tract symptoms      galantamine (RAZADYNE) 8 MG Tab Take 8 mg by mouth every evening.       memantine (NAMENDA) 10 MG Tab Take 10 mg by mouth 2 (two) times daily.       metoprolol succinate (TOPROL-XL) 50 MG 24 hr tablet TAKE ONE TABLET BY MOUTH ONCE DAILY  Qty: 90 tablet, Refills: 3    Associated Diagnoses: Permanent atrial fibrillation      omeprazole (PRILOSEC) 20 MG capsule TAKE ONE CAPSULE BY MOUTH TWICE DAILY  Qty: 180 capsule, Refills: 3      rosuvastatin (CRESTOR) 5 MG tablet Take 1 tablet (5 mg total) by mouth every evening.  Qty: 90 tablet, Refills: 3    Associated Diagnoses: Hypercholesterolemia      trospium (SANCTURA XR) 60 mg Cp24 capsule Take 60 mg by mouth every morning.       vortioxetine (TRINTELLIX) 10 mg Tab Take 10 mg by mouth once daily.      warfarin (COUMADIN) 5 MG tablet TAKE ONE TABLET BY MOUTH ONCE DAILY AT BEDTIME  Qty: 90 tablet, Refills: 5     Associated Diagnoses: Mitral valve disease; Permanent atrial fibrillation; Chronic anticoagulation      cholecalciferol, vitamin D3, (VITAMIN D3) 1,000 unit capsule Take 1,000 Units by mouth once daily.      fish oil-omega-3 fatty acids 300-1,000 mg capsule Take 1 g by mouth once daily.      nitroGLYCERIN (NITROSTAT) 0.4 MG SL tablet Place 1 tablet (0.4 mg total) under the tongue every 5 (five) minutes as needed for Chest pain.  Qty: 10 tablet, Refills: 1      potassium citrate (UROCIT-K) 10 mEq (1,080 mg) TbSR Take 1 tablet (10 mEq total) by mouth 3 (three) times daily with meals.  Qty: 90 tablet, Refills: 1      QUEtiapine (SEROQUEL) 25 MG Tab Take 25 mg by mouth once daily. 1 hour prior to bed time      UBIDECARENONE (COENZYME Q10) 200 mg Tab Take 1 capsule by mouth once daily.              Indwelling Lines/Drains at time of discharge:   Lines/Drains/Airways          No matching active lines, drains, or airways          Time spent on the discharge of patient: > 30 minutes  Patient was seen and examined on the date of discharge and determined to be suitable for discharge.         Cyril Huerta MD  Department of Hospital Medicine  [facility]

## 2021-07-19 NOTE — ASSESSMENT & PLAN NOTE
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  - 2004: Mitral valve replacement. [employer] Mechanical Valve.  - 2004: Began warfarin. Anticoagulation managed by Dr. Gamino.               Indication: Mechanical mitral valve and atrial fibrillation              Target INR 2.5-3.0     Biopsy Method: 15 blade

## 2021-12-19 NOTE — TELEPHONE ENCOUNTER
INR 3.5, Patient is presently taking 7.5 mg x 2/7, & 5mg x 5/7.  
Patients wife notified.  
Oriented - self; Oriented - place; Oriented - time

## 2023-07-06 NOTE — NURSING
MD at bedside - Laceration above R sutured. Vit K infusing. Report provided to DEO Sommers at [facility] ED.    no